# Patient Record
Sex: FEMALE | Race: WHITE | NOT HISPANIC OR LATINO | Employment: OTHER | URBAN - NONMETROPOLITAN AREA
[De-identification: names, ages, dates, MRNs, and addresses within clinical notes are randomized per-mention and may not be internally consistent; named-entity substitution may affect disease eponyms.]

---

## 2017-01-04 DIAGNOSIS — Z79.4 DIABETES MELLITUS TYPE 2, INSULIN DEPENDENT (HCC): ICD-10-CM

## 2017-01-04 DIAGNOSIS — E11.9 DIABETES MELLITUS TYPE 2, INSULIN DEPENDENT (HCC): ICD-10-CM

## 2017-01-04 RX ORDER — GLIPIZIDE 10 MG/1
10 TABLET ORAL
Qty: 180 TABLET | Refills: 2 | Status: SHIPPED | OUTPATIENT
Start: 2017-01-04 | End: 2017-09-28 | Stop reason: SDUPTHER

## 2017-01-10 RX ORDER — INSULIN GLARGINE 100 [IU]/ML
42 INJECTION, SOLUTION SUBCUTANEOUS NIGHTLY
Qty: 3 EACH | Refills: 3 | Status: SHIPPED | OUTPATIENT
Start: 2017-01-10 | End: 2017-05-09

## 2017-02-15 RX ORDER — PAROXETINE HYDROCHLORIDE 40 MG/1
40 TABLET, FILM COATED ORAL EVERY MORNING
Qty: 30 TABLET | Refills: 2 | Status: SHIPPED | OUTPATIENT
Start: 2017-02-15 | End: 2017-05-15 | Stop reason: SDUPTHER

## 2017-02-24 RX ORDER — LOVASTATIN 40 MG/1
40 TABLET ORAL 2 TIMES DAILY
Qty: 60 TABLET | Refills: 0 | Status: SHIPPED | OUTPATIENT
Start: 2017-02-24 | End: 2017-03-27 | Stop reason: SDUPTHER

## 2017-03-20 RX ORDER — TRAZODONE HYDROCHLORIDE 50 MG/1
50 TABLET ORAL NIGHTLY
Qty: 30 TABLET | Refills: 0 | Status: SHIPPED | OUTPATIENT
Start: 2017-03-20 | End: 2017-04-17 | Stop reason: SDUPTHER

## 2017-03-23 ENCOUNTER — APPOINTMENT (OUTPATIENT)
Dept: LAB | Facility: HOSPITAL | Age: 72
End: 2017-03-23

## 2017-03-23 PROCEDURE — 36415 COLL VENOUS BLD VENIPUNCTURE: CPT | Performed by: INTERNAL MEDICINE

## 2017-03-23 PROCEDURE — 80061 LIPID PANEL: CPT | Performed by: INTERNAL MEDICINE

## 2017-03-23 PROCEDURE — 80048 BASIC METABOLIC PNL TOTAL CA: CPT | Performed by: INTERNAL MEDICINE

## 2017-03-23 PROCEDURE — 83036 HEMOGLOBIN GLYCOSYLATED A1C: CPT | Performed by: INTERNAL MEDICINE

## 2017-03-27 ENCOUNTER — OFFICE VISIT (OUTPATIENT)
Dept: INTERNAL MEDICINE | Facility: CLINIC | Age: 72
End: 2017-03-27

## 2017-03-27 VITALS
DIASTOLIC BLOOD PRESSURE: 60 MMHG | SYSTOLIC BLOOD PRESSURE: 110 MMHG | HEIGHT: 64 IN | BODY MASS INDEX: 42.73 KG/M2 | WEIGHT: 250.3 LBS

## 2017-03-27 DIAGNOSIS — E78.2 MIXED HYPERLIPIDEMIA: ICD-10-CM

## 2017-03-27 DIAGNOSIS — I10 ESSENTIAL HYPERTENSION: ICD-10-CM

## 2017-03-27 DIAGNOSIS — Z12.11 SCREENING FOR COLON CANCER: ICD-10-CM

## 2017-03-27 DIAGNOSIS — Z12.11 SCREEN FOR COLON CANCER: ICD-10-CM

## 2017-03-27 DIAGNOSIS — Z79.4 TYPE 2 DIABETES MELLITUS WITH HYPERGLYCEMIA, WITH LONG-TERM CURRENT USE OF INSULIN (HCC): Primary | ICD-10-CM

## 2017-03-27 DIAGNOSIS — F17.200 TOBACCO DEPENDENCE: ICD-10-CM

## 2017-03-27 DIAGNOSIS — E11.65 TYPE 2 DIABETES MELLITUS WITH HYPERGLYCEMIA, WITH LONG-TERM CURRENT USE OF INSULIN (HCC): Primary | ICD-10-CM

## 2017-03-27 PROCEDURE — 99213 OFFICE O/P EST LOW 20 MIN: CPT | Performed by: INTERNAL MEDICINE

## 2017-03-27 PROCEDURE — 99406 BEHAV CHNG SMOKING 3-10 MIN: CPT | Performed by: INTERNAL MEDICINE

## 2017-03-27 RX ORDER — PEN NEEDLE, DIABETIC 31 G X1/4"
1 NEEDLE, DISPOSABLE MISCELLANEOUS DAILY
Qty: 30 EACH | Refills: 11 | Status: SHIPPED | OUTPATIENT
Start: 2017-03-27 | End: 2017-05-09

## 2017-03-27 RX ORDER — LOVASTATIN 40 MG/1
TABLET ORAL
Qty: 60 TABLET | Refills: 0 | Status: SHIPPED | OUTPATIENT
Start: 2017-03-27 | End: 2017-04-24 | Stop reason: SDUPTHER

## 2017-03-27 RX ORDER — INSULIN GLARGINE 100 [IU]/ML
42 INJECTION, SOLUTION SUBCUTANEOUS NIGHTLY
Qty: 6 PEN | Refills: 11 | Status: SHIPPED | OUTPATIENT
Start: 2017-03-27 | End: 2017-06-14 | Stop reason: SDUPTHER

## 2017-03-27 NOTE — PROGRESS NOTES
Subjective   Michelle Pierce is a 71 y.o. female     Patient ois in for recheck on HTN, DM, hyperlipidemia and anxiety.      Her BP is well controlled on Lisinopril. She denies any cardiovascular complaints.  She cut down on Sodium in her diet.   Patient is compliant with her medication and takes it as prescribed.  Her mobility is limited by arthritis in her knees.  She still smokes. Patient has Nicoderm but has not tried it yet.      DM, on Insulin.  Not always compliant with ADA diet.  She cut on drinking sugary drinks.  Weight is down 5 lbs.  Quality uncontrolled.  FBS is between 80 and 100. She does not check postprandial glucose.  HgbA1c 7.44. /03/23/2017/.  On ACE inhibitor for HTN.  On statin for hyperlipidemia.  Lipids. , , HDL 45, LDL 78.   She tolerates Lovastatin well and denies myalgia, myositis or fatigue.  No history of elevated LFTs.  Microvascular complications of DM. No retinopathy. No peripheral neuropathy.  Macrovascular complications. No CAD. No PAD.      Anxiety is controlled on Paxil. She is sleeping better on Trazodone. Denies any side effects related to the medications.     Past Medical History:   Diagnosis Date   • Arthritis of spine    • Chronic obstructive lung disease    • Depression    • Diabetes mellitus    • Hyperlipidemia    • Hypertension    • Neurologic disorder    • Obesity    • Sleep apnea    • Tobacco dependence      Past Surgical History:   Procedure Laterality Date   • ESOPHAGOSCOPY / EGD  02/25/1998    Mild esophagitis, mild gastritis, the most likely diagnosis for the esophageal inflammation is reflux esophagitis.   • EYE SURGERY      cataract, right eye   • FINGER SURGERY  04/07/1994    Partial amputation, right ring finger   • HYSTERECTOMY     • TUBAL ABDOMINAL LIGATION         Social History   Substance Use Topics   • Smoking status: Current Every Day Smoker     Packs/day: 1.00     Years: 50.00     Types: Cigarettes   • Smokeless tobacco: Not on file   • Alcohol  use No     Family History   Problem Relation Age of Onset   • Diabetes Other    • Heart disease Other    • Hypertension Other      Allergies   Allergen Reactions   • Morpholine Salicylate Shortness Of Breath     Morphine allergy, states couldn't breathe   • Morphine And Related      Dyspnea     Current Outpatient Prescriptions on File Prior to Visit   Medication Sig Dispense Refill   • ACCU-CHEK COMPACT TEST DRUM test strip USE TO TEST SUGAR TWICE DAILY ([id number] EXP 12/31/2017) 100 each 1   • B Complex Vitamins (VITAMIN B COMPLEX) capsule capsule Take 1 capsule by mouth daily.     • B-D UF III MINI PEN NEEDLES 31G X 5 MM misc      • cholecalciferol (VITAMIN D3) 1000 UNITS tablet Take 1,000 Units by mouth daily.     • glipiZIDE (GLUCOTROL) 10 MG tablet Take 1 tablet by mouth 2 (Two) Times a Day Before Meals. 180 tablet 2   • insulin glargine (LANTUS) 100 UNIT/ML injection Inject 42 Units under the skin Every Night. 3 each 3   • lisinopril (PRINIVIL,ZESTRIL) 5 MG tablet Take 0.5 tablets by mouth Daily. 30 tablet 3   • metFORMIN (GLUCOPHAGE) 1000 MG tablet Take 1 tablet by mouth 2 (Two) Times a Day With Meals. 180 tablet 2   • nicotine (NICODERM CQ) 21 MG/24HR patch Place 1 patch on the skin every day. 30 patch 0   • PARoxetine (PAXIL) 40 MG tablet Take 1 tablet by mouth Every Morning. 30 tablet 2   • traZODone (DESYREL) 50 MG tablet Take 1 tablet by mouth Every Night. 30 tablet 0   • [DISCONTINUED] Insulin Pen Needle (PEN NEEDLES) 31G X 6 MM misc Inject 1 applicator under the skin daily.     • [DISCONTINUED] LANTUS SOLOSTAR 100 UNIT/ML injection pen      • [DISCONTINUED] lovastatin (MEVACOR) 40 MG tablet Take 1 tablet by mouth 2 (Two) Times a Day. 60 tablet 0     No current facility-administered medications on file prior to visit.      Review of Systems   Constitutional: Negative for activity change.   HENT: Negative.    Eyes: Negative for visual disturbance.   Cardiovascular: Negative for chest pain, palpitations  and leg swelling.   Gastrointestinal: Negative for abdominal pain, constipation and diarrhea.   Endocrine: Positive for polydipsia and polyuria. Negative for cold intolerance and heat intolerance.   Musculoskeletal: Positive for back pain.        Positive for pain in her knees   Neurological: Negative for dizziness, tremors, light-headedness and headaches.   Psychiatric/Behavioral: Negative for sleep disturbance. The patient is not nervous/anxious.        Objective   Physical Exam   Constitutional: She is oriented to person, place, and time.   HENT:   Head: Normocephalic and atraumatic.   Nose: Nose normal.   Mouth/Throat: Oropharynx is clear and moist.   Neck: Neck supple. No JVD present. No thyromegaly present.   Cardiovascular: Normal rate and regular rhythm.    No murmur heard.  Pulmonary/Chest: Effort normal and breath sounds normal.   Abdominal: Soft. Bowel sounds are normal. She exhibits no mass.   Lymphadenopathy:     She has no cervical adenopathy.   Neurological: She is alert and oriented to person, place, and time.   Psychiatric: She has a normal mood and affect. Her behavior is normal.   Nursing note and vitals reviewed.          Patient Active Problem List   Diagnosis   • Diabetes mellitus, type II, insulin dependent   • Essential hypertension   • Depressive disorder   • Tobacco dependence   • Sleep apnea             Results for orders placed or performed in visit on 12/20/16   Hemoglobin A1c   Result Value Ref Range    Hemoglobin A1C 7.44 (H) 4 - 5.6 %   Basic Metabolic Panel   Result Value Ref Range    Glucose 104 (H) 60 - 100 mg/dL    BUN 10 7 - 21 mg/dL    Creatinine 0.74 0.50 - 1.00 mg/dL    Sodium 139 137 - 145 mmol/L    Potassium 4.4 3.5 - 5.1 mmol/L    Chloride 104 95 - 110 mmol/L    CO2 27.0 22.0 - 31.0 mmol/L    Calcium 9.5 8.4 - 10.2 mg/dL    eGFR Non  Amer 78 45 - 104 mL/min/1.73    BUN/Creatinine Ratio 13.5 7.0 - 25.0    Anion Gap 8.0 5.0 - 15.0 mmol/L   Lipid Panel   Result Value  Ref Range    Total Cholesterol 163 0 - 199 mg/dL    Triglycerides 128 20 - 199 mg/dL    HDL Cholesterol 45 (L) 60 - 200 mg/dL    LDL Cholesterol  78 1 - 129 mg/dL    LDL/HDL Ratio 2.05 0.00 - 3.22       Assessment    Diagnosis Plan   1. Type 2 diabetes mellitus with hyperglycemia, with long-term current use of insulin     2. Essential hypertension     3. Mixed hyperlipidemia     4. Tobacco dependence     5. Screening for colon cancer  Cologuard         Plan     1. Patient will continue Lantus 42 units.      She will continue Metformin and Glipizide.      Will add Farxiga 5 mg daily.      Side effects of the medications discussed with the patient.      ADA diet discussed with the patient.  2. Patient will continue Lisinopril.      DASH.      1.5 gr Sodium restriction.  3. Therapy with Lovastatin is continued.       Advised on weight loss.       Advised to increase physical activity.  4. I advised the patient of the risks in continuing to use tobacco.  I also discussed how to quit smoking and the patient has expressed the willingness to           quit. She will try Nicoderm patches.        During this visit, I spent less than 3 minutes counseling the patient regarding smoking cessation.             Follow up in 3 months.

## 2017-04-17 RX ORDER — TRAZODONE HYDROCHLORIDE 50 MG/1
TABLET ORAL
Qty: 30 TABLET | Refills: 1 | Status: SHIPPED | OUTPATIENT
Start: 2017-04-17 | End: 2017-06-19 | Stop reason: SDUPTHER

## 2017-04-24 RX ORDER — LOVASTATIN 40 MG/1
40 TABLET ORAL 2 TIMES DAILY
Qty: 60 TABLET | Refills: 3 | Status: SHIPPED | OUTPATIENT
Start: 2017-04-24 | End: 2017-08-24 | Stop reason: SDUPTHER

## 2017-05-08 ENCOUNTER — TELEPHONE (OUTPATIENT)
Dept: INTERNAL MEDICINE | Facility: CLINIC | Age: 72
End: 2017-05-08

## 2017-05-09 ENCOUNTER — OFFICE VISIT (OUTPATIENT)
Dept: GASTROENTEROLOGY | Facility: CLINIC | Age: 72
End: 2017-05-09

## 2017-05-09 VITALS
WEIGHT: 247.3 LBS | HEIGHT: 66 IN | BODY MASS INDEX: 39.74 KG/M2 | DIASTOLIC BLOOD PRESSURE: 64 MMHG | HEART RATE: 101 BPM | SYSTOLIC BLOOD PRESSURE: 109 MMHG

## 2017-05-09 DIAGNOSIS — K63.5 COLON POLYPS: ICD-10-CM

## 2017-05-09 DIAGNOSIS — Z12.11 ENCOUNTER FOR SCREENING FOR MALIGNANT NEOPLASM OF COLON: Primary | ICD-10-CM

## 2017-05-09 PROCEDURE — 99213 OFFICE O/P EST LOW 20 MIN: CPT | Performed by: INTERNAL MEDICINE

## 2017-05-09 RX ORDER — NEOMYCIN SULFATE, POLYMYXIN B SULFATE, HYDROCORTISONE 3.5; 10000; 1 MG/ML; [USP'U]/ML; MG/ML
SOLUTION/ DROPS AURICULAR (OTIC)
Refills: 2 | COMMUNITY
Start: 2017-04-10 | End: 2018-05-08

## 2017-05-09 RX ORDER — NYSTATIN 100000 U/G
OINTMENT TOPICAL
COMMUNITY
Start: 2017-04-10 | End: 2018-05-08

## 2017-05-09 RX ORDER — LIDOCAINE HYDROCHLORIDE 10 MG/ML
0.1 INJECTION, SOLUTION EPIDURAL; INFILTRATION; INTRACAUDAL; PERINEURAL ONCE AS NEEDED
Status: CANCELLED | OUTPATIENT
Start: 2017-05-09

## 2017-05-09 RX ORDER — TRIAMCINOLONE ACETONIDE 0.25 MG/G
CREAM TOPICAL
COMMUNITY
Start: 2017-04-11 | End: 2019-05-14

## 2017-05-09 RX ORDER — SODIUM CHLORIDE 0.9 % (FLUSH) 0.9 %
1-10 SYRINGE (ML) INJECTION AS NEEDED
Status: CANCELLED | OUTPATIENT
Start: 2017-05-09

## 2017-05-09 RX ORDER — DEXTROSE AND SODIUM CHLORIDE 5; .45 G/100ML; G/100ML
30 INJECTION, SOLUTION INTRAVENOUS CONTINUOUS PRN
Status: CANCELLED | OUTPATIENT
Start: 2017-06-01

## 2017-05-15 RX ORDER — PAROXETINE HYDROCHLORIDE 40 MG/1
40 TABLET, FILM COATED ORAL EVERY MORNING
Qty: 30 TABLET | Refills: 2 | Status: SHIPPED | OUTPATIENT
Start: 2017-05-15 | End: 2017-08-11 | Stop reason: SDUPTHER

## 2017-05-30 ENCOUNTER — TELEPHONE (OUTPATIENT)
Dept: INTERNAL MEDICINE | Facility: CLINIC | Age: 72
End: 2017-05-30

## 2017-06-01 ENCOUNTER — ANESTHESIA (OUTPATIENT)
Dept: GASTROENTEROLOGY | Facility: HOSPITAL | Age: 72
End: 2017-06-01

## 2017-06-01 ENCOUNTER — HOSPITAL ENCOUNTER (OUTPATIENT)
Facility: HOSPITAL | Age: 72
Setting detail: HOSPITAL OUTPATIENT SURGERY
Discharge: HOME | End: 2017-06-01
Attending: INTERNAL MEDICINE | Admitting: INTERNAL MEDICINE

## 2017-06-01 ENCOUNTER — ANESTHESIA EVENT (OUTPATIENT)
Dept: GASTROENTEROLOGY | Facility: HOSPITAL | Age: 72
End: 2017-06-01

## 2017-06-01 VITALS
RESPIRATION RATE: 18 BRPM | DIASTOLIC BLOOD PRESSURE: 56 MMHG | OXYGEN SATURATION: 94 % | TEMPERATURE: 98.4 F | BODY MASS INDEX: 42.58 KG/M2 | WEIGHT: 240.3 LBS | HEIGHT: 63 IN | HEART RATE: 68 BPM | SYSTOLIC BLOOD PRESSURE: 131 MMHG

## 2017-06-01 DIAGNOSIS — K63.5 COLON POLYPS: ICD-10-CM

## 2017-06-01 PROCEDURE — 88305 TISSUE EXAM BY PATHOLOGIST: CPT | Performed by: INTERNAL MEDICINE

## 2017-06-01 PROCEDURE — 45385 COLONOSCOPY W/LESION REMOVAL: CPT | Performed by: INTERNAL MEDICINE

## 2017-06-01 PROCEDURE — 88305 TISSUE EXAM BY PATHOLOGIST: CPT | Performed by: PATHOLOGY

## 2017-06-01 PROCEDURE — 25010000002 FENTANYL CITRATE (PF) 250 MCG/5ML SOLUTION: Performed by: NURSE ANESTHETIST, CERTIFIED REGISTERED

## 2017-06-01 PROCEDURE — 45380 COLONOSCOPY AND BIOPSY: CPT | Performed by: INTERNAL MEDICINE

## 2017-06-01 PROCEDURE — 25010000002 PROPOFOL 10 MG/ML EMULSION: Performed by: NURSE ANESTHETIST, CERTIFIED REGISTERED

## 2017-06-01 PROCEDURE — 25010000002 MIDAZOLAM PER 1 MG: Performed by: NURSE ANESTHETIST, CERTIFIED REGISTERED

## 2017-06-01 RX ORDER — MIDAZOLAM HYDROCHLORIDE 1 MG/ML
INJECTION INTRAMUSCULAR; INTRAVENOUS AS NEEDED
Status: DISCONTINUED | OUTPATIENT
Start: 2017-06-01 | End: 2017-06-01 | Stop reason: SURG

## 2017-06-01 RX ORDER — PROPOFOL 10 MG/ML
VIAL (ML) INTRAVENOUS AS NEEDED
Status: DISCONTINUED | OUTPATIENT
Start: 2017-06-01 | End: 2017-06-01 | Stop reason: SURG

## 2017-06-01 RX ORDER — FENTANYL CITRATE 50 UG/ML
INJECTION, SOLUTION INTRAMUSCULAR; INTRAVENOUS AS NEEDED
Status: DISCONTINUED | OUTPATIENT
Start: 2017-06-01 | End: 2017-06-01 | Stop reason: SURG

## 2017-06-01 RX ORDER — DEXTROSE MONOHYDRATE 50 MG/ML
INJECTION, SOLUTION INTRAVENOUS CONTINUOUS PRN
Status: DISCONTINUED | OUTPATIENT
Start: 2017-06-01 | End: 2017-06-01 | Stop reason: SURG

## 2017-06-01 RX ORDER — DEXTROSE AND SODIUM CHLORIDE 5; .45 G/100ML; G/100ML
30 INJECTION, SOLUTION INTRAVENOUS CONTINUOUS PRN
Status: DISCONTINUED | OUTPATIENT
Start: 2017-06-01 | End: 2017-06-01 | Stop reason: HOSPADM

## 2017-06-01 RX ADMIN — PROPOFOL 40 MG: 10 INJECTION, EMULSION INTRAVENOUS at 09:40

## 2017-06-01 RX ADMIN — FENTANYL CITRATE 50 MCG: 50 INJECTION, SOLUTION INTRAMUSCULAR; INTRAVENOUS at 09:13

## 2017-06-01 RX ADMIN — DEXTROSE MONOHYDRATE: 50 INJECTION, SOLUTION INTRAVENOUS at 09:03

## 2017-06-01 RX ADMIN — DEXTROSE AND SODIUM CHLORIDE 30 ML/HR: 5; 450 INJECTION, SOLUTION INTRAVENOUS at 09:02

## 2017-06-01 RX ADMIN — MIDAZOLAM 1 MG: 1 INJECTION INTRAMUSCULAR; INTRAVENOUS at 09:23

## 2017-06-01 RX ADMIN — PROPOFOL 10 MG: 10 INJECTION, EMULSION INTRAVENOUS at 09:50

## 2017-06-01 RX ADMIN — PROPOFOL 40 MG: 10 INJECTION, EMULSION INTRAVENOUS at 09:13

## 2017-06-01 RX ADMIN — MIDAZOLAM 1 MG: 1 INJECTION INTRAMUSCULAR; INTRAVENOUS at 09:13

## 2017-06-01 RX ADMIN — FENTANYL CITRATE 50 MCG: 50 INJECTION, SOLUTION INTRAMUSCULAR; INTRAVENOUS at 09:23

## 2017-06-01 RX ADMIN — PROPOFOL 160 MG: 10 INJECTION, EMULSION INTRAVENOUS at 09:38

## 2017-06-01 NOTE — ANESTHESIA PREPROCEDURE EVALUATION
Anesthesia Evaluation     Patient summary reviewed and Nursing notes reviewed   NPO Solid Status: > 8 hours       Airway   Mallampati: III  TM distance: >3 FB  Neck ROM: full  no difficulty expected  Dental    (+) upper dentures    Pulmonary - normal exam   (+) COPD moderate, sleep apnea,   Cardiovascular - normal exam    (+) hypertension well controlled, hyperlipidemia      Neuro/Psych  (+) psychiatric history,    GI/Hepatic/Renal/Endo      Musculoskeletal     Abdominal  - normal exam   Substance History      OB/GYN          Other   (+) arthritis                                   Anesthesia Plan    ASA 3     MAC     intravenous induction   Anesthetic plan and risks discussed with patient.    Plan discussed with CRNA.

## 2017-06-01 NOTE — ANESTHESIA POSTPROCEDURE EVALUATION
Patient: Michelle Pirece    Procedure Summary     Date Anesthesia Start Anesthesia Stop Room / Location    06/01/17 0903 1000 [facility] ENDOSCOPY 1 / [facility] ENDOSCOPY       Procedure Diagnosis Surgeon Provider    COLONOSCOPY (N/A ) Colon polyps  (Colon polyps [K63.5]) MD Philipp Whitley CRNA          Anesthesia Type: MAC  Last vitals  /56 (06/01/17 1006)    Temp 98.4 °F (36.9 °C) (06/01/17 1006)    Pulse 74 (06/01/17 1006)   Resp 18 (06/01/17 1006)    SpO2 93 % (06/01/17 1006)      Post Anesthesia Care and Evaluation    Patient location during evaluation: bedside  Patient participation: complete - patient participated  Level of consciousness: awake and alert  Pain score: 0  Pain management: adequate  Airway patency: patent  Anesthetic complications: No anesthetic complications  PONV Status: none  Cardiovascular status: acceptable  Respiratory status: acceptable  Hydration status: acceptable

## 2017-06-01 NOTE — H&P (VIEW-ONLY)
Chief Complaint   Patient presents with   • Positive Cologuard Resulting On 05/08/2017        Michelle Pierce is a  71 y.o. female here for a follow up visit for Recent positive colo-guard  HPI :  The patient is seen because of the recent finding of a positive colo-guard.   She has a past history of colonic polyps. Her last colonoscopy was done in 2005.   Bowel movements are occasionally loose. She does describe some urgency. This is been a recent development. She will occasionally have fecal incontinence. Denies rectal bleeding.   Family history is negative for carcinoma of the colon.    Past Medical History:   Diagnosis Date   • Arthritis of spine    • Chronic obstructive lung disease    • Depression    • Diabetes mellitus    • Hyperlipidemia    • Hypertension    • Neurologic disorder    • Obesity    • Sleep apnea    • Tobacco dependence        Current Outpatient Prescriptions   Medication Sig Dispense Refill   • ACCU-CHEK COMPACT TEST DRUM test strip USE TO TEST SUGAR TWICE DAILY ([id number] EXP 12/31/2017) 100 each 1   • B Complex Vitamins (VITAMIN B COMPLEX) capsule capsule Take 1 capsule by mouth daily.     • B-D UF III MINI PEN NEEDLES 31G X 5 MM misc      • cholecalciferol (VITAMIN D3) 1000 UNITS tablet Take 1,000 Units by mouth daily.     • dapagliflozin (FARXIGA) 5 MG tablet tablet Take 1 tablet by mouth Daily. 30 tablet 11   • glipiZIDE (GLUCOTROL) 10 MG tablet Take 1 tablet by mouth 2 (Two) Times a Day Before Meals. 180 tablet 2   • LANTUS SOLOSTAR 100 UNIT/ML injection pen Inject 42 Units under the skin Every Night. 6 pen 11   • lisinopril (PRINIVIL,ZESTRIL) 5 MG tablet Take 0.5 tablets by mouth Daily. 30 tablet 3   • lovastatin (MEVACOR) 40 MG tablet Take 1 tablet by mouth 2 (Two) Times a Day. 60 tablet 3   • metFORMIN (GLUCOPHAGE) 1000 MG tablet Take 1 tablet by mouth 2 (Two) Times a Day With Meals. 180 tablet 2   • nicotine (NICODERM CQ) 21 MG/24HR patch Place 1 patch on the skin every day. 30 patch  0   • PARoxetine (PAXIL) 40 MG tablet Take 1 tablet by mouth Every Morning. 30 tablet 2   • traZODone (DESYREL) 50 MG tablet TAKE 1 TABLET BY MOUTH EVERY NIGHT. 30 tablet 1   • neomycin-polymyxin-hydrocortisone (CORTISPORIN) 1 % solution otic solution APPLY UNDER AFFECTED NAILS TWICE DAILY  2   • nystatin (MYCOSTATIN) [id number] UNIT/GM ointment      • triamcinolone (KENALOG) 0.025 % cream      • Unable to find Nystat/HC 1% Zinc 111 Gram  2     No current facility-administered medications for this visit.        PRN Meds:.    Allergies   Allergen Reactions   • Morpholine Salicylate Shortness Of Breath     Morphine allergy, states couldn't breathe   • Morphine And Related      Dyspnea       Social History     Social History   • Marital status:      Spouse name: N/A   • Number of children: N/A   • Years of education: N/A     Occupational History   • Retired      Grandson resides with patient.     Social History Main Topics   • Smoking status: Current Every Day Smoker     Packs/day: 1.00     Years: 50.00     Types: Cigarettes   • Smokeless tobacco: Never Used      Comment: Patient has been prescribed Nicoderm CQ, but has not begun prescription medication therapy at present time.   • Alcohol use No   • Drug use: No   • Sexual activity: Defer     Other Topics Concern   • Not on file     Social History Narrative       Family History   Problem Relation Age of Onset   • Diabetes Other    • Heart disease Other    • Hypertension Other    • Diabetes Mother    • Hypertension Father    • Heart attack Father    • Kidney failure Father        Review of Systems   Constitutional: Negative for activity change, appetite change, chills, diaphoresis, fatigue, fever and unexpected weight change.   Gastrointestinal: Positive for diarrhea. Negative for abdominal distention, abdominal pain, anal bleeding, blood in stool, constipation, nausea, rectal pain and vomiting.   Psychiatric/Behavioral: Negative for confusion.       Vitals:     05/09/17 1412   BP: 109/64   Pulse: 101       Physical Exam   Constitutional: Vital signs are normal. She appears well-developed and well-nourished.  Non-toxic appearance. She does not have a sickly appearance. She does not appear ill. No distress.   Cardiovascular: Regular rhythm, S1 normal, S2 normal and normal heart sounds.   No extrasystoles are present. PMI is not displaced.    No murmur heard.  Pulmonary/Chest: Breath sounds normal. No tachypnea. No respiratory distress.   Abdominal: Soft. Normal appearance and bowel sounds are normal. She exhibits no shifting dullness, no distension, no fluid wave, no ascites and no mass. There is no hepatosplenomegaly, splenomegaly or hepatomegaly. There is no tenderness. No hernia.   Neurological: She is alert.   Nursing note and vitals reviewed.      ASSESSMENT AND PLAN      ICD-10-CM ICD-9-CM   1. Encounter for screening for malignant neoplasm of colon Z12.11 V76.51   2. Colon polyps K63.5 211.3   Plan:   Surveillance colonoscopy.   If a lesion is found at colonoscopy she does not wish to have this discussed with her family           This document has been electronically signed by Mat Jennings MD on May 14, 2017 2:57 PM                                   “EMR Dragon/Transcription disclaimer:   Much of this encounter note is an electronic transcription/translation of spoken language to printed text. The electronic translation of spoken language may permit erroneous, or at times, nonsensical words or phrases to be inadvertently transcribed; Although I have reviewed the note for such errors, some may still exist.”

## 2017-06-01 NOTE — PLAN OF CARE
Problem: Patient Care Overview (Adult)  Goal: Plan of Care Review    06/01/17 1002   Coping/Psychosocial Response Interventions   Plan Of Care Reviewed With patient   Patient Care Overview   Progress no change   Outcome Evaluation   Outcome Summary/Follow up Plan vss         Problem: GI Endoscopy (Adult)  Goal: Signs and Symptoms of Listed Potential Problems Will be Absent or Manageable (GI Endoscopy)  Outcome: Ongoing (interventions implemented as appropriate)    06/01/17 1002   GI Endoscopy   Problems Assessed (GI Endoscopy) all   Problems Present (GI Endoscopy) none

## 2017-06-01 NOTE — PLAN OF CARE
Problem: Patient Care Overview (Adult)  Goal: Plan of Care Review  Outcome: Outcome(s) achieved Date Met:  06/01/17 06/01/17 1008   Coping/Psychosocial Response Interventions   Plan Of Care Reviewed With patient   Patient Care Overview   Progress no change   Outcome Evaluation   Outcome Summary/Follow up Plan vss, pt alert         Problem: GI Endoscopy (Adult)  Goal: Signs and Symptoms of Listed Potential Problems Will be Absent or Manageable (GI Endoscopy)  Outcome: Outcome(s) achieved Date Met:  06/01/17 06/01/17 1008   GI Endoscopy   Problems Assessed (GI Endoscopy) all   Problems Present (GI Endoscopy) none

## 2017-06-01 NOTE — INTERVAL H&P NOTE
H&P reviewed. The patient was examined and there are no changes to the H&P.    I have discussed risks and benefits of the proposed procedure. Patient wishes to proceed.

## 2017-06-05 LAB
LAB AP CASE REPORT: NORMAL
Lab: NORMAL
PATH REPORT.FINAL DX SPEC: NORMAL
PATH REPORT.GROSS SPEC: NORMAL

## 2017-06-06 ENCOUNTER — TELEPHONE (OUTPATIENT)
Dept: GASTROENTEROLOGY | Facility: CLINIC | Age: 72
End: 2017-06-06

## 2017-06-06 NOTE — TELEPHONE ENCOUNTER
06/06/2017, 1400 - Patient's telephone call is returned per this staff member [phone] with requested Colonoscopy pathology performed 06/01/2017 per Dr. Jennings as stated in the Impression, Recommendation, and Final Diagnosis sections of procedure report.  Patient given reminder of, and encouraged to keep, clinical appointment with Dr. Jennings Wednesday, August 9, 2017 at 11:00 a.m. during which time results will be discussed in greater detail.  Patient verbalized understanding.

## 2017-06-14 RX ORDER — INSULIN GLARGINE 100 [IU]/ML
42 INJECTION, SOLUTION SUBCUTANEOUS NIGHTLY
Qty: 6 PEN | Refills: 1 | Status: SHIPPED | OUTPATIENT
Start: 2017-06-14 | End: 2017-08-22 | Stop reason: SDUPTHER

## 2017-06-19 RX ORDER — TRAZODONE HYDROCHLORIDE 50 MG/1
50 TABLET ORAL NIGHTLY
Qty: 30 TABLET | Refills: 0 | Status: SHIPPED | OUTPATIENT
Start: 2017-06-19 | End: 2017-07-20 | Stop reason: SDUPTHER

## 2017-07-11 ENCOUNTER — TELEPHONE (OUTPATIENT)
Dept: INTERNAL MEDICINE | Facility: CLINIC | Age: 72
End: 2017-07-11

## 2017-07-11 RX ORDER — LISINOPRIL 5 MG/1
2.5 TABLET ORAL DAILY
Qty: 30 TABLET | Refills: 0 | Status: SHIPPED | OUTPATIENT
Start: 2017-07-11 | End: 2017-09-08 | Stop reason: SDUPTHER

## 2017-07-11 NOTE — TELEPHONE ENCOUNTER
Pt needs a refill on lisinopril 5 mg 1 x daily sent to [facility].

## 2017-07-20 ENCOUNTER — OFFICE VISIT (OUTPATIENT)
Dept: INTERNAL MEDICINE | Facility: CLINIC | Age: 72
End: 2017-07-20

## 2017-07-20 ENCOUNTER — APPOINTMENT (OUTPATIENT)
Dept: LAB | Facility: HOSPITAL | Age: 72
End: 2017-07-20

## 2017-07-20 VITALS
BODY MASS INDEX: 43.32 KG/M2 | HEIGHT: 63 IN | SYSTOLIC BLOOD PRESSURE: 130 MMHG | WEIGHT: 244.5 LBS | DIASTOLIC BLOOD PRESSURE: 70 MMHG

## 2017-07-20 DIAGNOSIS — E78.2 MIXED HYPERLIPIDEMIA: ICD-10-CM

## 2017-07-20 DIAGNOSIS — B37.2 CANDIDIASIS OF SKIN: ICD-10-CM

## 2017-07-20 DIAGNOSIS — F32.A ANXIETY AND DEPRESSION: ICD-10-CM

## 2017-07-20 DIAGNOSIS — I10 ESSENTIAL HYPERTENSION: ICD-10-CM

## 2017-07-20 DIAGNOSIS — F41.9 ANXIETY AND DEPRESSION: ICD-10-CM

## 2017-07-20 DIAGNOSIS — E11.9 TYPE 2 DIABETES MELLITUS WITHOUT COMPLICATION, WITH LONG-TERM CURRENT USE OF INSULIN (HCC): Primary | ICD-10-CM

## 2017-07-20 DIAGNOSIS — Z79.4 TYPE 2 DIABETES MELLITUS WITHOUT COMPLICATION, WITH LONG-TERM CURRENT USE OF INSULIN (HCC): Primary | ICD-10-CM

## 2017-07-20 LAB
ALBUMIN SERPL-MCNC: 4.4 G/DL (ref 3.4–4.8)
ALBUMIN UR-MCNC: 2.2 MG/L
ALBUMIN/GLOB SERPL: 1.2 G/DL (ref 1.1–1.8)
ALP SERPL-CCNC: 97 U/L (ref 38–126)
ALT SERPL W P-5'-P-CCNC: 25 U/L (ref 9–52)
ANION GAP SERPL CALCULATED.3IONS-SCNC: 13 MMOL/L (ref 5–15)
AST SERPL-CCNC: 29 U/L (ref 14–36)
BILIRUB SERPL-MCNC: 0.2 MG/DL (ref 0.2–1.3)
BUN BLD-MCNC: 13 MG/DL (ref 7–21)
BUN/CREAT SERPL: 15.3 (ref 7–25)
CALCIUM SPEC-SCNC: 9.8 MG/DL (ref 8.4–10.2)
CHLORIDE SERPL-SCNC: 99 MMOL/L (ref 95–110)
CO2 SERPL-SCNC: 25 MMOL/L (ref 22–31)
CREAT BLD-MCNC: 0.85 MG/DL (ref 0.5–1)
CREAT UR-MCNC: 137.7 MG/DL
GFR SERPL CREATININE-BSD FRML MDRD: 66 ML/MIN/1.73 (ref 45–104)
GLOBULIN UR ELPH-MCNC: 3.6 GM/DL (ref 2.3–3.5)
GLUCOSE BLD-MCNC: 94 MG/DL (ref 60–100)
GLUCOSE BLDC GLUCOMTR-MCNC: 55 MG/DL (ref 70–130)
HBA1C MFR BLD: 6.6 % (ref 4–5.6)
MICROALBUMIN/CREAT UR: 16 MG/G (ref 0–30)
POTASSIUM BLD-SCNC: 4.7 MMOL/L (ref 3.5–5.1)
PROT SERPL-MCNC: 8 G/DL (ref 6.3–8.6)
SODIUM BLD-SCNC: 137 MMOL/L (ref 137–145)

## 2017-07-20 PROCEDURE — 36415 COLL VENOUS BLD VENIPUNCTURE: CPT | Performed by: INTERNAL MEDICINE

## 2017-07-20 PROCEDURE — 99214 OFFICE O/P EST MOD 30 MIN: CPT | Performed by: INTERNAL MEDICINE

## 2017-07-20 PROCEDURE — 82962 GLUCOSE BLOOD TEST: CPT | Performed by: INTERNAL MEDICINE

## 2017-07-20 PROCEDURE — 82043 UR ALBUMIN QUANTITATIVE: CPT | Performed by: INTERNAL MEDICINE

## 2017-07-20 PROCEDURE — 82570 ASSAY OF URINE CREATININE: CPT | Performed by: INTERNAL MEDICINE

## 2017-07-20 PROCEDURE — 83036 HEMOGLOBIN GLYCOSYLATED A1C: CPT | Performed by: INTERNAL MEDICINE

## 2017-07-20 PROCEDURE — 80053 COMPREHEN METABOLIC PANEL: CPT | Performed by: INTERNAL MEDICINE

## 2017-07-20 RX ORDER — NYSTATIN 100000 [USP'U]/G
POWDER TOPICAL 3 TIMES DAILY
Qty: 45 G | Refills: 1 | Status: SHIPPED | OUTPATIENT
Start: 2017-07-20 | End: 2018-05-08

## 2017-07-20 RX ORDER — TRAZODONE HYDROCHLORIDE 50 MG/1
50 TABLET ORAL NIGHTLY
Qty: 30 TABLET | Refills: 5 | Status: SHIPPED | OUTPATIENT
Start: 2017-07-20 | End: 2017-12-21 | Stop reason: SDUPTHER

## 2017-07-20 RX ORDER — ESTRADIOL 0.1 MG/G
CREAM VAGINAL
Qty: 42.5 G | Refills: 1 | Status: SHIPPED | OUTPATIENT
Start: 2017-07-20 | End: 2019-02-14

## 2017-07-20 NOTE — PROGRESS NOTES
Subjective   Michelle Pierce is a 71 y.o. female     Patient is in for recheck on HTN, DM, hyperlipidemia and depressive disorder.    Her BP is controlled on Lisinopril. She checks BP at home and it has been in normal range.  She has no cardiovascular complaints.  She continues to smoke and does not think she can quit or reduce at present.      DM, type II.  Duration over 10 years.  Not always compliant with ADA diet.  On Lantus, Metformin and Glipipizide.  Farxiga is too expensive,  Glucose is better controlled and runs between 100 and 150.  HgbA1c 7.44 /03/23/2017/.  On ACE for HTN.  On statin for hyperlipidemia.  Tolerates Lovastatin well.  No myalgia or myositis. No history of elevated LFTs.  Lipids. LDL 78, HDL 45.  Microvascular complications of DM. No retinopathy. No neuropathy.  Macrovascular complications. No CAD. No PAD.    Complains of pruritic erythematous rash under her breasts and in inguinal area.    Paxil is working well for anxiety. Insomnia improved on Trazodone. Denies side effects related to the medications.    Past Medical History:   Diagnosis Date   • Arthritis of spine    • Chronic obstructive lung disease    • Depression    • Diabetes mellitus    • Hyperlipidemia    • Hypertension    • Neurologic disorder    • Obesity    • Sleep apnea    • Tobacco dependence      Past Surgical History:   Procedure Laterality Date   • BACK SURGERY  2013    May 1 or 2, 2013   • COLONOSCOPY  01/21/2005   • COLONOSCOPY N/A 6/1/2017    Procedure: COLONOSCOPY;  Surgeon: Mat Jennings MD;  Location: [facility] ENDOSCOPY;  Service:    • ESOPHAGOSCOPY / EGD  02/25/1998    Mild esophagitis, mild gastritis, the most likely diagnosis for the esophageal inflammation is reflux esophagitis.   • EYE SURGERY      cataract, right eye   • FINGER SURGERY  04/07/1994    Partial amputation, right ring finger   • HYSTERECTOMY     • TUBAL ABDOMINAL LIGATION         Social History   Substance Use Topics   • Smoking status: Current  Every Day Smoker     Packs/day: 1.00     Years: 50.00     Types: Cigarettes   • Smokeless tobacco: Never Used      Comment: Patient has been prescribed Nicoderm CQ, but has not begun prescription medication therapy at present time.   • Alcohol use No     Family History   Problem Relation Age of Onset   • Diabetes Other    • Heart disease Other    • Hypertension Other    • Diabetes Mother    • Hypertension Father    • Heart attack Father    • Kidney failure Father      Allergies   Allergen Reactions   • Morpholine Salicylate Shortness Of Breath     Morphine allergy, states couldn't breathe   • Morphine And Related      Dyspnea     Current Outpatient Prescriptions on File Prior to Visit   Medication Sig Dispense Refill   • B Complex Vitamins (VITAMIN B COMPLEX) capsule capsule Take 1 capsule by mouth daily.     • B-D UF III MINI PEN NEEDLES 31G X 5 MM misc      • cholecalciferol (VITAMIN D3) 1000 UNITS tablet Take 1,000 Units by mouth daily.     • glipiZIDE (GLUCOTROL) 10 MG tablet Take 1 tablet by mouth 2 (Two) Times a Day Before Meals. 180 tablet 2   • LANTUS SOLOSTAR 100 UNIT/ML injection pen Inject 42 Units under the skin Every Night. 6 pen 1   • lisinopril (PRINIVIL,ZESTRIL) 5 MG tablet Take 0.5 tablets by mouth Daily. 30 tablet 0   • lovastatin (MEVACOR) 40 MG tablet Take 1 tablet by mouth 2 (Two) Times a Day. 60 tablet 3   • metFORMIN (GLUCOPHAGE) 1000 MG tablet Take 1 tablet by mouth 2 (Two) Times a Day With Meals. 180 tablet 2   • neomycin-polymyxin-hydrocortisone (CORTISPORIN) 1 % solution otic solution APPLY UNDER AFFECTED NAILS TWICE DAILY  2   • nicotine (NICODERM CQ) 21 MG/24HR patch Place 1 patch on the skin every day. 30 patch 0   • nystatin (MYCOSTATIN) 998490 UNIT/GM ointment      • PARoxetine (PAXIL) 40 MG tablet Take 1 tablet by mouth Every Morning. 30 tablet 2   • triamcinolone (KENALOG) 0.025 % cream      • Unable to find Nystat/HC 1% Zinc 111 Gram  2   • [DISCONTINUED] ACCU-CHEK COMPACT TEST  DRUM test strip USE TO TEST SUGAR TWICE DAILY ([id number] EXP 12/31/2017) 100 each 1   • [DISCONTINUED] dapagliflozin (FARXIGA) 5 MG tablet tablet Take 1 tablet by mouth Daily. 30 tablet 11   • [DISCONTINUED] traZODone (DESYREL) 50 MG tablet Take 1 tablet by mouth Every Night. 30 tablet 0     No current facility-administered medications on file prior to visit.      Review of Systems   Constitutional: Negative for activity change and fatigue.   Eyes: Negative for photophobia and visual disturbance.   Respiratory: Negative for chest tightness and shortness of breath.    Cardiovascular: Negative for chest pain, palpitations and leg swelling.   Gastrointestinal: Negative for abdominal pain, constipation and diarrhea.   Endocrine: Positive for polydipsia. Negative for cold intolerance and heat intolerance.   Genitourinary: Negative for flank pain and frequency.   Musculoskeletal: Positive for back pain.   Skin: Positive for rash.   Neurological: Negative for dizziness, tremors and light-headedness.   Psychiatric/Behavioral: The patient is not nervous/anxious.        Objective   Physical Exam   Constitutional: She is oriented to person, place, and time. She appears well-developed and well-nourished.   HENT:   Head: Normocephalic.   Right Ear: External ear normal.   Eyes: Conjunctivae are normal. Pupils are equal, round, and reactive to light.   Neck: Neck supple. No JVD present. No thyromegaly present.   Cardiovascular: Normal rate and regular rhythm.    No murmur heard.  Pulmonary/Chest: Effort normal and breath sounds normal.   Abdominal: Soft. Bowel sounds are normal. She exhibits no mass.   Musculoskeletal: Normal range of motion. She exhibits no tenderness.   Neurological: She is alert and oriented to person, place, and time. She has normal reflexes.   Skin: Skin is warm and dry.   Psychiatric: She has a normal mood and affect.           Patient Active Problem List   Diagnosis   • Diabetes mellitus, type II, insulin  dependent   • Essential hypertension   • Depressive disorder   • Tobacco dependence   • Sleep apnea         Office Visit on 07/20/2017   Component Date Value Ref Range Status   • Glucose 07/20/2017 94  60 - 100 mg/dL Final   • BUN 07/20/2017 13  7 - 21 mg/dL Final   • Creatinine 07/20/2017 0.85  0.50 - 1.00 mg/dL Final   • Sodium 07/20/2017 137  137 - 145 mmol/L Final   • Potassium 07/20/2017 4.7  3.5 - 5.1 mmol/L Final   • Chloride 07/20/2017 99  95 - 110 mmol/L Final   • CO2 07/20/2017 25.0  22.0 - 31.0 mmol/L Final   • Calcium 07/20/2017 9.8  8.4 - 10.2 mg/dL Final   • Total Protein 07/20/2017 8.0  6.3 - 8.6 g/dL Final   • Albumin 07/20/2017 4.40  3.40 - 4.80 g/dL Final   • ALT (SGPT) 07/20/2017 25  9 - 52 U/L Final   • AST (SGOT) 07/20/2017 29  14 - 36 U/L Final   • Alkaline Phosphatase 07/20/2017 97  38 - 126 U/L Final   • Total Bilirubin 07/20/2017 0.2  0.2 - 1.3 mg/dL Final   • eGFR Non  Amer 07/20/2017 66  45 - 104 mL/min/1.73 Final   • Globulin 07/20/2017 3.6* 2.3 - 3.5 gm/dL Final   • A/G Ratio 07/20/2017 1.2  1.1 - 1.8 g/dL Final   • BUN/Creatinine Ratio 07/20/2017 15.3  7.0 - 25.0 Final   • Anion Gap 07/20/2017 13.0  5.0 - 15.0 mmol/L Final   • Hemoglobin A1C 07/20/2017 6.60* 4 - 5.6 % Final   • Microalbumin/Creatinine Ratio 07/20/2017 16.0  0.0 - 30.0 mg/g Final   • Creatinine, Urine 07/20/2017 137.7  mg/dL Final   • Microalbumin, Urine 07/20/2017 2.2  mg/L Final   • Glucose 07/20/2017 55* 70 - 130 mg/dL Final   Admission on 06/01/2017, Discharged on 06/01/2017   Component Date Value Ref Range Status   • Case Report 06/01/2017    Final                    Value:Surgical Pathology Report                         Case: [id number]                                  Authorizing Provider:  Mat Jennings MD    Collected:           06/01/2017 09:23 AM          Ordering Location:     [facility]             Received:            06/01/2017 11:25 AM                                 [facility]  SUITES                                                     Pathologist:           Tyler Faust MD                                                            Specimens:   1) - Large Intestine, 40cm hot snare                                                                2) - Large Intestine, Transverse Colon, cold snare                                                  3) - Large Intestine, hepatic flexure                                                               4) - Large Intestine, illeocecal valve                                                              5) - Large Intestine, Cecum, cecum polyp                                                                                      6) - Large Intestine, Right / Ascending Colon                                                       7) - Large Intestine, splenic flexure                                                               8) - Large Intestine, Rectum                                                              • Final Diagnosis 06/01/2017    Final                    Value:This result contains rich text formatting which cannot be displayed here.   • Gross Description 06/01/2017    Final                    Value:This result contains rich text formatting which cannot be displayed here.   Office Visit on 12/20/2016   Component Date Value Ref Range Status   • Hemoglobin A1C 03/23/2017 7.44* 4 - 5.6 % Final   • Glucose 03/23/2017 104* 60 - 100 mg/dL Final   • BUN 03/23/2017 10  7 - 21 mg/dL Final   • Creatinine 03/23/2017 0.74  0.50 - 1.00 mg/dL Final   • Sodium 03/23/2017 139  137 - 145 mmol/L Final   • Potassium 03/23/2017 4.4  3.5 - 5.1 mmol/L Final   • Chloride 03/23/2017 104  95 - 110 mmol/L Final   • CO2 03/23/2017 27.0  22.0 - 31.0 mmol/L Final   • Calcium 03/23/2017 9.5  8.4 - 10.2 mg/dL Final   • eGFR Non African Amer 03/23/2017 78  45 - 104 mL/min/1.73 Final   • BUN/Creatinine Ratio 03/23/2017 13.5  7.0 - 25.0 Final   • Anion Gap 03/23/2017 8.0  5.0  - 15.0 mmol/L Final   • Total Cholesterol 03/23/2017 163  0 - 199 mg/dL Final   • Triglycerides 03/23/2017 128  20 - 199 mg/dL Final   • HDL Cholesterol 03/23/2017 45* 60 - 200 mg/dL Final   • LDL Cholesterol  03/23/2017 78  1 - 129 mg/dL Final   • LDL/HDL Ratio 03/23/2017 2.05  0.00 - 3.22 Final         Assessment    Diagnosis Plan   1. Type 2 diabetes mellitus without complication, with long-term current use of insulin  Comprehensive Metabolic Panel    Hemoglobin A1c    Microalbumin / Creatinine Urine Ratio    POC Glucose Fingerstick   2. Essential hypertension     3. Mixed hyperlipidemia     4. Anxiety and depression     5. Candidiasis of skin           Plan      1. Patient will continue Lantus, Metformin and Glipizide.      ADA diet.      Advised to monitor glucose on regular basis.      Goals of diabetic control discussed.      Will recheck HgbA1c.      Signs and treatment of hypoglycemia discussed with the patient.  2. Lisinopril is continued.      DASH.      1.5 gr Sodium restriction.  3. Lovastatin is continued.      Advised on weight loss and physical activity.  4. Patient will continue Paxil and Trazodone.  5. Nystatin powder tid to affected area.      Advised to keep area dry.      Follow up in 3 months.

## 2017-07-21 ENCOUNTER — TELEPHONE (OUTPATIENT)
Dept: INTERNAL MEDICINE | Facility: CLINIC | Age: 72
End: 2017-07-21

## 2017-08-04 ENCOUNTER — TELEPHONE (OUTPATIENT)
Dept: GASTROENTEROLOGY | Facility: CLINIC | Age: 72
End: 2017-08-04

## 2017-08-04 NOTE — TELEPHONE ENCOUNTER
07/28/2017, 1302 - Patient telephoned canceling clinical appointment with Dr. Jennings scheduled Wednesday, August 9, 2017 at 11:00 a.m. due to family member undergoing operation in [place].    08/04/2017, 1329 - Patient telephoned per this staff member [phone] to ensure patient aware of Colonoscopy pathology results (this staff member had previously spoken with patient 06/06/2017 as patient had requested pathology results, and notified pathology  results would be discussed in greater detail per Dr. Jennings during clinical appointment).  Dr. Jennings made aware of pathology and stated patient to repeat Colonoscopy at 2 year interval.  Patient notified she should receive letter via mail when Colonoscopy date is due.  Patient verbalized understanding.

## 2017-08-11 ENCOUNTER — TELEPHONE (OUTPATIENT)
Dept: INTERNAL MEDICINE | Facility: CLINIC | Age: 72
End: 2017-08-11

## 2017-08-11 RX ORDER — PAROXETINE HYDROCHLORIDE 40 MG/1
40 TABLET, FILM COATED ORAL EVERY MORNING
Qty: 30 TABLET | Refills: 2 | Status: SHIPPED | OUTPATIENT
Start: 2017-08-11 | End: 2017-11-13 | Stop reason: SDUPTHER

## 2017-08-22 ENCOUNTER — TELEPHONE (OUTPATIENT)
Dept: INTERNAL MEDICINE | Facility: CLINIC | Age: 72
End: 2017-08-22

## 2017-08-22 RX ORDER — INSULIN GLARGINE 100 [IU]/ML
42 INJECTION, SOLUTION SUBCUTANEOUS NIGHTLY
Qty: 6 PEN | Refills: 1 | Status: SHIPPED | OUTPATIENT
Start: 2017-08-22 | End: 2017-11-07 | Stop reason: SDUPTHER

## 2017-08-24 ENCOUNTER — TELEPHONE (OUTPATIENT)
Dept: INTERNAL MEDICINE | Facility: CLINIC | Age: 72
End: 2017-08-24

## 2017-08-24 RX ORDER — LOVASTATIN 40 MG/1
TABLET ORAL
Qty: 60 TABLET | Refills: 1 | Status: SHIPPED | OUTPATIENT
Start: 2017-08-24 | End: 2017-10-23 | Stop reason: SDUPTHER

## 2017-09-08 ENCOUNTER — TELEPHONE (OUTPATIENT)
Dept: INTERNAL MEDICINE | Facility: CLINIC | Age: 72
End: 2017-09-08

## 2017-09-08 RX ORDER — LISINOPRIL 5 MG/1
2.5 TABLET ORAL DAILY
Qty: 30 TABLET | Refills: 1 | Status: SHIPPED | OUTPATIENT
Start: 2017-09-08 | End: 2017-12-21 | Stop reason: SDUPTHER

## 2017-09-18 ENCOUNTER — TELEPHONE (OUTPATIENT)
Dept: INTERNAL MEDICINE | Facility: CLINIC | Age: 72
End: 2017-09-18

## 2017-09-28 ENCOUNTER — TELEPHONE (OUTPATIENT)
Dept: INTERNAL MEDICINE | Facility: CLINIC | Age: 72
End: 2017-09-28

## 2017-09-28 DIAGNOSIS — E11.9 DIABETES MELLITUS TYPE 2, INSULIN DEPENDENT (HCC): ICD-10-CM

## 2017-09-28 DIAGNOSIS — Z79.4 DIABETES MELLITUS TYPE 2, INSULIN DEPENDENT (HCC): ICD-10-CM

## 2017-09-28 RX ORDER — GLIPIZIDE 10 MG/1
10 TABLET ORAL
Qty: 60 TABLET | Refills: 0 | Status: SHIPPED | OUTPATIENT
Start: 2017-09-28 | End: 2017-10-04 | Stop reason: SDUPTHER

## 2017-10-04 ENCOUNTER — CONSULT (OUTPATIENT)
Dept: SURGERY | Facility: CLINIC | Age: 72
End: 2017-10-04

## 2017-10-04 ENCOUNTER — TELEPHONE (OUTPATIENT)
Dept: INTERNAL MEDICINE | Facility: CLINIC | Age: 72
End: 2017-10-04

## 2017-10-04 VITALS
BODY MASS INDEX: 43.59 KG/M2 | SYSTOLIC BLOOD PRESSURE: 116 MMHG | DIASTOLIC BLOOD PRESSURE: 72 MMHG | HEIGHT: 63 IN | WEIGHT: 246 LBS

## 2017-10-04 DIAGNOSIS — C44.90 SKIN CANCER: Primary | ICD-10-CM

## 2017-10-04 DIAGNOSIS — E11.9 DIABETES MELLITUS TYPE 2, INSULIN DEPENDENT (HCC): ICD-10-CM

## 2017-10-04 DIAGNOSIS — Z79.4 DIABETES MELLITUS TYPE 2, INSULIN DEPENDENT (HCC): ICD-10-CM

## 2017-10-04 PROCEDURE — 11602 EXC TR-EXT MAL+MARG 1.1-2 CM: CPT | Performed by: SURGERY

## 2017-10-04 PROCEDURE — 88305 TISSUE EXAM BY PATHOLOGIST: CPT | Performed by: PATHOLOGY

## 2017-10-04 PROCEDURE — 88305 TISSUE EXAM BY PATHOLOGIST: CPT | Performed by: SURGERY

## 2017-10-04 PROCEDURE — 99203 OFFICE O/P NEW LOW 30 MIN: CPT | Performed by: SURGERY

## 2017-10-04 RX ORDER — GLIPIZIDE 10 MG/1
10 TABLET ORAL
Qty: 60 TABLET | Refills: 0 | Status: SHIPPED | OUTPATIENT
Start: 2017-10-04 | End: 2017-10-23 | Stop reason: SDUPTHER

## 2017-10-04 NOTE — PROGRESS NOTES
Subjective   Michelle Pierce is a 71 y.o. female.   Referral from [facility] skin cancer  History of Present Illness   Miss Pierce is a 71-year-old lady with a previous history of skin cancer.  She had a skin cancer removed from her right forearm done several years ago.  She has had another skin cancer arise just next-year-old incision.  Dr. Haywood recently saw her in his shave biopsy of the right arm lesion that came back positive for cancer.  She's had multiple lesions frozen off on her skin before.    Past medical history of arthritis, skin cancer, diabetes.    Surgical history of hysterectomy and tubal ligation.      Current Outpatient Prescriptions:   •  B Complex Vitamins (VITAMIN B COMPLEX) capsule capsule, Take 1 capsule by mouth daily., Disp: , Rfl:   •  B-D UF III MINI PEN NEEDLES 31G X 5 MM misc, , Disp: , Rfl:   •  cholecalciferol (VITAMIN D3) 1000 UNITS tablet, Take 1,000 Units by mouth daily., Disp: , Rfl:   •  glipiZIDE (GLUCOTROL) 10 MG tablet, Take 1 tablet by mouth 2 (Two) Times a Day Before Meals., Disp: 60 tablet, Rfl: 0  •  glucose blood test strip, 1 each by Other route As Needed (as directed). Use as instructed, Disp: 100 each, Rfl: 1  •  LANTUS SOLOSTAR 100 UNIT/ML injection pen, Inject 42 Units under the skin Every Night., Disp: 6 pen, Rfl: 1  •  lisinopril (PRINIVIL,ZESTRIL) 5 MG tablet, Take 0.5 tablets by mouth Daily., Disp: 30 tablet, Rfl: 1  •  lovastatin (MEVACOR) 40 MG tablet, TAKE 1 TABLET BY MOUTH 2 (TWO) TIMES A DAY., Disp: 60 tablet, Rfl: 1  •  metFORMIN (GLUCOPHAGE) 1000 MG tablet, Take 1 tablet by mouth 2 (Two) Times a Day With Meals., Disp: 180 tablet, Rfl: 0  •  nystatin (MYCOSTATIN) 869965 UNIT/GM powder, Apply  topically 3 (Three) Times a Day., Disp: 45 g, Rfl: 1  •  PARoxetine (PAXIL) 40 MG tablet, Take 1 tablet by mouth Every Morning., Disp: 30 tablet, Rfl: 2  •  traZODone (DESYREL) 50 MG tablet, Take 1 tablet by mouth Every Night., Disp: 30 tablet, Rfl: 5  •  estradiol  (ESTRACE VAGINAL) 0.1 MG/GM vaginal cream, 1/2 applicator 3 times a week, Disp: 42.5 g, Rfl: 1  •  neomycin-polymyxin-hydrocortisone (CORTISPORIN) 1 % solution otic solution, APPLY UNDER AFFECTED NAILS TWICE DAILY, Disp: , Rfl: 2  •  nicotine (NICODERM CQ) 21 MG/24HR patch, Place 1 patch on the skin every day., Disp: 30 patch, Rfl: 0  •  nystatin (MYCOSTATIN) 939024 UNIT/GM ointment, , Disp: , Rfl:   •  triamcinolone (KENALOG) 0.025 % cream, , Disp: , Rfl:   •  Unable to find, Nystat/HC 1% Zinc 111 Gram, Disp: , Rfl: 2    Family history of diabetes, heart disease, kidney disease.    Social patient is retired and lives [place] and is .  She has 3 children and smokes pack a day cigarettes doesn't use alcohol.  The following portions of the patient's history were reviewed and updated as appropriate: allergies, current medications, past family history, past medical history, past social history, past surgical history and problem list.    Review of Systems   Constitutional: Negative.  Negative for appetite change, chills, fever and unexpected weight change.   HENT: Negative.  Negative for hearing loss, nosebleeds and trouble swallowing.    Eyes: Negative.  Negative for visual disturbance.   Respiratory: Positive for cough and shortness of breath. Negative for apnea, choking, chest tightness, wheezing and stridor.    Cardiovascular: Negative.  Negative for chest pain, palpitations and leg swelling.   Gastrointestinal: Negative.  Negative for abdominal distention, abdominal pain, blood in stool, constipation, diarrhea, nausea and vomiting.   Endocrine: Negative.  Negative for cold intolerance, heat intolerance, polydipsia, polyphagia and polyuria.   Genitourinary: Negative.  Negative for difficulty urinating, dysuria, frequency, hematuria and urgency.   Musculoskeletal: Positive for arthralgias and back pain. Negative for myalgias and neck pain.   Skin: Negative.  Negative for color change, pallor and rash.    Allergic/Immunologic: Negative.  Negative for immunocompromised state.   Neurological: Negative.  Negative for dizziness, seizures, syncope, light-headedness, numbness and headaches.   Hematological: Negative.  Negative for adenopathy.   Psychiatric/Behavioral: Negative.  Negative for suicidal ideas. The patient is not nervous/anxious.        Objective   Physical Exam   Constitutional: She is oriented to person, place, and time. She appears well-developed and well-nourished.   HENT:   Head: Normocephalic and atraumatic.   Eyes: EOM are normal. Pupils are equal, round, and reactive to light.   Neck: Normal range of motion. Neck supple.   Cardiovascular: Normal rate and regular rhythm.    Pulmonary/Chest: Effort normal and breath sounds normal.   Abdominal: Soft. Bowel sounds are normal.   Musculoskeletal: Normal range of motion.   Neurological: She is alert and oriented to person, place, and time.   Skin: Skin is warm and dry.   Psychiatric: She has a normal mood and affect. Her behavior is normal.   Vitals reviewed.  Exam she has chronically skin sending her skin on both her upper extremities.  The right forearm there is no incision multiple small skin lesions.  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  Assessment/Plan   Michelle was seen today for skin cancer.    Diagnoses and all orders for this visit:    Skin cancer    Mrs. Pierce is a 71-year-old lady with recurrent skin cancer on her right forearm.  After further discussion today we'll excise this lesion in clinic and do a punch biopsy on one of the lesions also right next to the incision.  The procedures explained to her and she understands and agrees.  Problem were 2 weeks for suture removal and pathology review.  Thank you for the consult.

## 2017-10-04 NOTE — PROGRESS NOTES
Procedure   Procedures      Consent was obtained.  Patient sustained to the treatment room and right arm was prepped and draped normal sterile fashion.  Timeout was performed.  Local anesthetic was injected in around lesion.  We first did a punch biopsy of the concerning skin lesion.  Next week size and the node skin cancer on her right forearm.  A 1 cm elliptical incision was made to completely excise the skin lesion.  Incision was taken down to the to the dermis to subcutaneous tissue and the skin was passed off for specimen.  Hemostasis was achieved with 4-0 Vicryl.  After some pressure the wound was closed with running 4-0 nylon suture.  Band-Aids were applied and procedure was terminated.  Patient tolerated she well.

## 2017-10-13 ENCOUNTER — TELEPHONE (OUTPATIENT)
Dept: SURGERY | Facility: CLINIC | Age: 72
End: 2017-10-13

## 2017-10-13 NOTE — TELEPHONE ENCOUNTER
PT WOULD LIKE FOR YOU TO CALL HER W/ RESULTS  FROM [facility]    DOES NOT WANT TO KEEP APPT  ON Monday .....    CALL PT   [phone]

## 2017-10-23 ENCOUNTER — TELEPHONE (OUTPATIENT)
Dept: INTERNAL MEDICINE | Facility: CLINIC | Age: 72
End: 2017-10-23

## 2017-10-23 DIAGNOSIS — Z79.4 DIABETES MELLITUS TYPE 2, INSULIN DEPENDENT (HCC): ICD-10-CM

## 2017-10-23 DIAGNOSIS — E11.9 DIABETES MELLITUS TYPE 2, INSULIN DEPENDENT (HCC): ICD-10-CM

## 2017-10-23 RX ORDER — LOVASTATIN 40 MG/1
40 TABLET ORAL NIGHTLY
Qty: 30 TABLET | Refills: 0 | Status: SHIPPED | OUTPATIENT
Start: 2017-10-23 | End: 2017-11-07 | Stop reason: SDUPTHER

## 2017-10-23 RX ORDER — GLIPIZIDE 10 MG/1
10 TABLET ORAL
Qty: 60 TABLET | Refills: 0 | Status: SHIPPED | OUTPATIENT
Start: 2017-10-23 | End: 2017-11-01 | Stop reason: SDUPTHER

## 2017-10-23 NOTE — TELEPHONE ENCOUNTER
Patient needs a refill on glipizide 10 mg BID and lovastatin 40 mg BID at [facility].

## 2017-11-01 DIAGNOSIS — Z79.4 DIABETES MELLITUS TYPE 2, INSULIN DEPENDENT (HCC): ICD-10-CM

## 2017-11-01 DIAGNOSIS — E11.9 DIABETES MELLITUS TYPE 2, INSULIN DEPENDENT (HCC): ICD-10-CM

## 2017-11-01 RX ORDER — GLIPIZIDE 10 MG/1
TABLET ORAL
Qty: 60 TABLET | Refills: 0 | Status: SHIPPED | OUTPATIENT
Start: 2017-11-01 | End: 2017-12-21 | Stop reason: SDUPTHER

## 2017-11-06 ENCOUNTER — OFFICE VISIT (OUTPATIENT)
Dept: PODIATRY | Facility: CLINIC | Age: 72
End: 2017-11-06

## 2017-11-06 VITALS
OXYGEN SATURATION: 94 % | BODY MASS INDEX: 43.59 KG/M2 | SYSTOLIC BLOOD PRESSURE: 126 MMHG | HEART RATE: 101 BPM | WEIGHT: 246 LBS | DIASTOLIC BLOOD PRESSURE: 63 MMHG | HEIGHT: 63 IN

## 2017-11-06 DIAGNOSIS — B35.3 TINEA PEDIS OF LEFT FOOT: Primary | ICD-10-CM

## 2017-11-06 DIAGNOSIS — L40.9 PSORIASIS: ICD-10-CM

## 2017-11-06 DIAGNOSIS — M79.672 FOOT PAIN, LEFT: ICD-10-CM

## 2017-11-06 PROCEDURE — 99203 OFFICE O/P NEW LOW 30 MIN: CPT | Performed by: PODIATRIST

## 2017-11-06 RX ORDER — CLOTRIMAZOLE 1 %
CREAM (GRAM) TOPICAL 2 TIMES DAILY
Qty: 45 G | Refills: 2 | Status: SHIPPED | OUTPATIENT
Start: 2017-11-06 | End: 2019-05-14

## 2017-11-06 RX ORDER — BETAMETHASONE DIPROPIONATE 0.05 %
OINTMENT (GRAM) TOPICAL 2 TIMES DAILY
Qty: 45 G | Refills: 2 | Status: SHIPPED | OUTPATIENT
Start: 2017-11-06

## 2017-11-06 NOTE — PROGRESS NOTES
Michelle Pierce  1945  72 y.o. female   PCP- Adonis  BS-93 per patient.  Patient presents today for possible rash on her left foot.    11/06/2017  Chief Complaint   Patient presents with   • Left Foot - Rash           History of Present Illness    Michelle Pierce is a 72 y.o. female who presents for evaluation of skin lesion or rash on her left foot. Has history of squamous cell carcinoma as well as psoriasis. States this area of concern has been present for months to years. Intermittently fissures and causes pain. Occasionally itchy. Denies signs or infection.            Past Medical History:   Diagnosis Date   • Arthritis of spine    • Chronic obstructive lung disease    • Cirrhosis    • Depression    • Diabetes mellitus    • Hyperlipidemia    • Hypertension    • Neurologic disorder    • Obesity    • Sleep apnea    • Tobacco dependence          Past Surgical History:   Procedure Laterality Date   • BACK SURGERY  2013    May 1 or 2, 2013   • COLONOSCOPY  01/21/2005   • COLONOSCOPY N/A 6/1/2017    Procedure: COLONOSCOPY;  Surgeon: Mat Jennings MD;  Location: [facility] ENDOSCOPY;  Service:    • ESOPHAGOSCOPY / EGD  02/25/1998    Mild esophagitis, mild gastritis, the most likely diagnosis for the esophageal inflammation is reflux esophagitis.   • EYE SURGERY      cataract, right eye   • FINGER SURGERY  04/07/1994    Partial amputation, right ring finger   • HYSTERECTOMY     • TUBAL ABDOMINAL LIGATION           Family History   Problem Relation Age of Onset   • Diabetes Other    • Heart disease Other    • Hypertension Other    • Diabetes Mother    • Hypertension Father    • Heart attack Father    • Kidney failure Father    • No Known Problems Maternal Grandmother    • No Known Problems Maternal Grandfather    • No Known Problems Paternal Grandmother    • No Known Problems Paternal Grandfather          Social History     Social History   • Marital status:      Spouse name: N/A   • Number of children: N/A    • Years of education: N/A     Occupational History   • Retired      Grandson resides with patient.     Social History Main Topics   • Smoking status: Current Every Day Smoker     Packs/day: 1.00     Years: 50.00     Types: Cigarettes   • Smokeless tobacco: Never Used      Comment: Patient has been prescribed Nicoderm CQ, but has not begun prescription medication therapy at present time.   • Alcohol use No   • Drug use: No   • Sexual activity: Defer     Other Topics Concern   • Not on file     Social History Narrative         Current Outpatient Prescriptions   Medication Sig Dispense Refill   • B Complex Vitamins (VITAMIN B COMPLEX) capsule capsule Take 1 capsule by mouth daily.     • B-D UF III MINI PEN NEEDLES 31G X 5 MM misc      • cholecalciferol (VITAMIN D3) 1000 UNITS tablet Take 1,000 Units by mouth daily.     • estradiol (ESTRACE VAGINAL) 0.1 MG/GM vaginal cream 1/2 applicator 3 times a week 42.5 g 1   • glipiZIDE (GLUCOTROL) 10 MG tablet TAKE 1 TABLET TWICE DAILY BEFORE MEALS 60 tablet 0   • glucose blood test strip 1 each by Other route As Needed (as directed). Use as instructed 100 each 1   • lisinopril (PRINIVIL,ZESTRIL) 5 MG tablet Take 0.5 tablets by mouth Daily. 30 tablet 1   • metFORMIN (GLUCOPHAGE) 1000 MG tablet Take 1 tablet by mouth 2 (Two) Times a Day With Meals. 180 tablet 0   • neomycin-polymyxin-hydrocortisone (CORTISPORIN) 1 % solution otic solution APPLY UNDER AFFECTED NAILS TWICE DAILY  2   • nicotine (NICODERM CQ) 21 MG/24HR patch Place 1 patch on the skin every day. 30 patch 0   • nystatin (MYCOSTATIN) 237712 UNIT/GM ointment      • nystatin (MYCOSTATIN) 170940 UNIT/GM powder Apply  topically 3 (Three) Times a Day. 45 g 1   • traZODone (DESYREL) 50 MG tablet Take 1 tablet by mouth Every Night. 30 tablet 5   • triamcinolone (KENALOG) 0.025 % cream      • Unable to find Nystat/HC 1% Zinc 111 Gram  2   • albuterol (VENTOLIN HFA) 108 (90 Base) MCG/ACT inhaler Inhale 2 puffs Every 4 (Four)  "Hours As Needed for Wheezing. 1 inhaler 0   • amoxicillin-clavulanate (AUGMENTIN) 875-125 MG per tablet Take 1 tablet by mouth 2 (Two) Times a Day for 10 days. 20 tablet 0   • benzonatate (TESSALON) 200 MG capsule Take 1 capsule by mouth 3 (Three) Times a Day As Needed for Cough. 30 capsule 0   • betamethasone dipropionate (DIPROLENE) 0.05 % ointment Apply  topically 2 (Two) Times a Day. 45 g 2   • clotrimazole (LOTRIMIN) 1 % cream Apply  topically 2 (Two) Times a Day. 45 g 2   • LANTUS SOLOSTAR 100 UNIT/ML injection pen Inject 42 Units under the skin Every Night. 6 pen 0   • lovastatin (MEVACOR) 40 MG tablet Take 1 tablet by mouth 2 (Two) Times a Day. 60 tablet 0   • PARoxetine (PAXIL) 40 MG tablet Take 1 tablet by mouth Every Morning. 30 tablet 2   • promethazine-dextromethorphan (PROMETHAZINE-DM) 6.25-15 MG/5ML syrup Take 5 mL by mouth At Night As Needed for Cough. 120 mL 0     No current facility-administered medications for this visit.          OBJECTIVE    /63  Pulse 101  Ht 63\" (160 cm)  Wt 246 lb (112 kg)  SpO2 94%  BMI 43.58 kg/m2      Review of Systems   Constitutional:  Denies recent weight loss, weight gain, fever or chills, no change in exercise tolerance  Musculoskeletal: Foot pain.   Skin: Left foot rash  Neurological: Denies paresthesias.  Psychiatric/Behavioral: Denies depression        Physical Exam   Constitutional: she appears well-developed and well-nourished.   HEENT: Normocephalic. Atraumatic  CV: No tenderness. RRR  Resp: Non-labored respiration. No wheezes.   Psychiatric: she has a normal mood and affect. her   behavior is normal.      Lower Extremity Exam:  Vascular: DP/PT pulses palpable 1+.   Negative hair growth.   Minimal perimalleolar edema  Neuro: Protective sensation intact, b/l.  DTRs intact  Integument: No open wounds  Left plantar foot scaling in moccasin distribution. No drainage  Atrophic skin noted b/l   No masses  Web spaces dry  Musculoskeletal: LE muscle strength " 5/5.   Gait Normal  Mild hammertoe deformity toes 2-5 b/l.        ASSESSMENT AND PLAN    Michelle was seen today for rash.    Diagnoses and all orders for this visit:    Tinea pedis of left foot    Psoriasis    Foot pain, left    Other orders  -     clotrimazole (LOTRIMIN) 1 % cream; Apply  topically 2 (Two) Times a Day.  -     betamethasone dipropionate (DIPROLENE) 0.05 % ointment; Apply  topically 2 (Two) Times a Day.    -Comprehensive foot and ankle exam performed  -Educated pt on diagnosis, etiology and treatment of vesicular tinea pedis  -Rx Lotrisone BID, [facility] pharmacy antifungal foot bath  -Recheck 4 weeks          This document has been electronically signed by Brendon Lyon DPM on November 19, 2017 10:11 PM     EMR Dragon/Transcription disclaimer:   Much of this encounter note is an electronic transcription/translation of spoken language to printed text. The electronic translation of spoken language may permit erroneous, or at times, nonsensical words or phrases to be inadvertently transcribed; Although I have reviewed the note for such errors, some may still exist.    Brendon Lyon DPM  11/19/2017  10:11 PM

## 2017-11-07 ENCOUNTER — TELEPHONE (OUTPATIENT)
Dept: INTERNAL MEDICINE | Facility: CLINIC | Age: 72
End: 2017-11-07

## 2017-11-07 ENCOUNTER — OFFICE VISIT (OUTPATIENT)
Dept: SLEEP MEDICINE | Facility: HOSPITAL | Age: 72
End: 2017-11-07

## 2017-11-07 VITALS
OXYGEN SATURATION: 96 % | DIASTOLIC BLOOD PRESSURE: 72 MMHG | SYSTOLIC BLOOD PRESSURE: 132 MMHG | HEART RATE: 94 BPM | HEIGHT: 63 IN | BODY MASS INDEX: 44.12 KG/M2 | WEIGHT: 249 LBS

## 2017-11-07 DIAGNOSIS — G47.33 OBSTRUCTIVE SLEEP APNEA, ADULT: Primary | ICD-10-CM

## 2017-11-07 PROCEDURE — 99213 OFFICE O/P EST LOW 20 MIN: CPT | Performed by: INTERNAL MEDICINE

## 2017-11-07 PROCEDURE — 99406 BEHAV CHNG SMOKING 3-10 MIN: CPT | Performed by: INTERNAL MEDICINE

## 2017-11-07 RX ORDER — LOVASTATIN 40 MG/1
40 TABLET ORAL 2 TIMES DAILY
Qty: 60 TABLET | Refills: 0 | Status: SHIPPED | OUTPATIENT
Start: 2017-11-07 | End: 2017-12-06 | Stop reason: SDUPTHER

## 2017-11-07 RX ORDER — INSULIN GLARGINE 100 [IU]/ML
42 INJECTION, SOLUTION SUBCUTANEOUS NIGHTLY
Qty: 6 PEN | Refills: 0 | Status: SHIPPED | OUTPATIENT
Start: 2017-11-07 | End: 2017-12-18 | Stop reason: SDUPTHER

## 2017-11-07 NOTE — PROGRESS NOTES
Sleep Clinic Follow Up    Date: 2017  Primary Care Physician: Valerie Lamb MD      Interim History (1/3):  Since the last visit on 10/26/2015, patient has:      1)  JENA - Has remained compliant with CPAP. She denies mask and machine issues, dry mouth, headaches, pressures intolerance, or non-compliance. She denies abnormal dreams, sleep paralysis, hypnagogic hallucinations, or cataplexy symptoms.       Bed time: varies - not consistent (8619-5504)  Sleep latency: varies   Number of times awakens during the night: 0-3  Wake time: 0681-7823  Estimated total sleep time at night: 8 hours  Caffeine intake: 3 cups of coffee, 1 soda  Alcohol intake: none  Nap time: none  Sleepiness with Driving: none    Kiron - 5    2) Patient denies RLS symptoms.     PMHx, FH, SH reviewed and pertinent changes are: foot fungus versus psoriasis        REVIEW OF SYSTEMS:   Negative for chest pain, fever, chills, SOA, abdominal pain. Smokin ppd      Exam ():    Vitals:    17 1450   BP: 132/72   Pulse: 94   SpO2: 96%     Body mass index is 44.11 kg/(m^2).  Gen:  No distress, conversant, pleasant, appears stated age, alert, oriented  Eyes:   Anicteric sclera, moist conjunctiva, no lid lag     PERRLA, EOMI   Heent:   NC/AT    Oropharynx clear, Mallampati 4, false teeth    normal hearing  Lungs:  Normal effort, non-labored breathing    Clear to auscultation    CV:  Normal S1/S2, no murmur    no lower extremity edema except on feet  ABD:  Soft, normal bowel sounds, obese      Psych:  Appropriate affect  Neuro:  CN 2-12 intact    Past Medical History:   Diagnosis Date   • Arthritis of spine    • Chronic obstructive lung disease    • Cirrhosis    • Depression    • Diabetes mellitus    • Hyperlipidemia    • Hypertension    • Neurologic disorder    • Obesity    • Sleep apnea    • Tobacco dependence        Current Outpatient Prescriptions:   •  B Complex Vitamins (VITAMIN B COMPLEX) capsule capsule, Take 1 capsule by mouth  daily., Disp: , Rfl:   •  B-D UF III MINI PEN NEEDLES 31G X 5 MM misc, , Disp: , Rfl:   •  betamethasone dipropionate (DIPROLENE) 0.05 % ointment, Apply  topically 2 (Two) Times a Day., Disp: 45 g, Rfl: 2  •  cholecalciferol (VITAMIN D3) 1000 UNITS tablet, Take 1,000 Units by mouth daily., Disp: , Rfl:   •  clotrimazole (LOTRIMIN) 1 % cream, Apply  topically 2 (Two) Times a Day., Disp: 45 g, Rfl: 2  •  estradiol (ESTRACE VAGINAL) 0.1 MG/GM vaginal cream, 1/2 applicator 3 times a week, Disp: 42.5 g, Rfl: 1  •  glipiZIDE (GLUCOTROL) 10 MG tablet, TAKE 1 TABLET TWICE DAILY BEFORE MEALS, Disp: 60 tablet, Rfl: 0  •  glucose blood test strip, 1 each by Other route As Needed (as directed). Use as instructed, Disp: 100 each, Rfl: 1  •  LANTUS SOLOSTAR 100 UNIT/ML injection pen, Inject 42 Units under the skin Every Night., Disp: 6 pen, Rfl: 0  •  lisinopril (PRINIVIL,ZESTRIL) 5 MG tablet, Take 0.5 tablets by mouth Daily., Disp: 30 tablet, Rfl: 1  •  lovastatin (MEVACOR) 40 MG tablet, Take 1 tablet by mouth 2 (Two) Times a Day., Disp: 60 tablet, Rfl: 0  •  metFORMIN (GLUCOPHAGE) 1000 MG tablet, Take 1 tablet by mouth 2 (Two) Times a Day With Meals., Disp: 180 tablet, Rfl: 0  •  neomycin-polymyxin-hydrocortisone (CORTISPORIN) 1 % solution otic solution, APPLY UNDER AFFECTED NAILS TWICE DAILY, Disp: , Rfl: 2  •  nicotine (NICODERM CQ) 21 MG/24HR patch, Place 1 patch on the skin every day., Disp: 30 patch, Rfl: 0  •  nystatin (MYCOSTATIN) 171829 UNIT/GM ointment, , Disp: , Rfl:   •  nystatin (MYCOSTATIN) 963201 UNIT/GM powder, Apply  topically 3 (Three) Times a Day., Disp: 45 g, Rfl: 1  •  PARoxetine (PAXIL) 40 MG tablet, Take 1 tablet by mouth Every Morning., Disp: 30 tablet, Rfl: 2  •  traZODone (DESYREL) 50 MG tablet, Take 1 tablet by mouth Every Night., Disp: 30 tablet, Rfl: 5  •  triamcinolone (KENALOG) 0.025 % cream, , Disp: , Rfl:   •  Unable to find, Nystat/HC 1% Zinc 111 Gram, Disp: , Rfl: 2      ASSESSMENT / PLAN:  "    1. Obstructive sleep apnea  1. PSG on 10/11/2012, AHI of 74  2. CPAP titration on same day, recommended 17/11 cm H2O  3. Currently on ? cm H2O  4. Continue PAP as prescribed.   5. Script for PAP supplies  6. Return to clinic in 1 year with compliance check unless sx change in the interim period.  2. Nicotine dependence with complication- given [employer] \"Thinking of quitting smoking\" flyer and referred patient to call 800-QUIT-NOW. Smoking and tobacco use cessation counseling visit was 3 minutes.*      Total time 15 min, more than half spent in face to face counseling and coordination of care.     This document has been electronically signed by Zach Ortiz MD on November 7, 2017         CC: Valerie Lamb MD          No ref. provider found  "

## 2017-11-07 NOTE — TELEPHONE ENCOUNTER
Pt needs a refill on insulin solostar and lovastatin 40 mg 2 x per day sent to [facility].   She has an apt Dec 21 for a 4 mo [facility].

## 2017-11-13 ENCOUNTER — TELEPHONE (OUTPATIENT)
Dept: INTERNAL MEDICINE | Facility: CLINIC | Age: 72
End: 2017-11-13

## 2017-11-13 RX ORDER — PAROXETINE HYDROCHLORIDE 40 MG/1
40 TABLET, FILM COATED ORAL EVERY MORNING
Qty: 30 TABLET | Refills: 2 | Status: SHIPPED | OUTPATIENT
Start: 2017-11-13 | End: 2017-12-21

## 2017-11-28 ENCOUNTER — OFFICE VISIT (OUTPATIENT)
Dept: PODIATRY | Facility: CLINIC | Age: 72
End: 2017-11-28

## 2017-11-28 VITALS — WEIGHT: 241 LBS | BODY MASS INDEX: 41.15 KG/M2 | HEIGHT: 64 IN

## 2017-11-28 DIAGNOSIS — B35.3 TINEA PEDIS OF LEFT FOOT: Primary | ICD-10-CM

## 2017-11-28 DIAGNOSIS — L40.9 PSORIASIS: ICD-10-CM

## 2017-11-28 DIAGNOSIS — M79.672 FOOT PAIN, LEFT: ICD-10-CM

## 2017-11-28 PROCEDURE — 99213 OFFICE O/P EST LOW 20 MIN: CPT | Performed by: PODIATRIST

## 2017-12-06 ENCOUNTER — TELEPHONE (OUTPATIENT)
Dept: INTERNAL MEDICINE | Facility: CLINIC | Age: 72
End: 2017-12-06

## 2017-12-06 RX ORDER — LOVASTATIN 40 MG/1
40 TABLET ORAL 2 TIMES DAILY
Qty: 60 TABLET | Refills: 0 | Status: SHIPPED | OUTPATIENT
Start: 2017-12-06 | End: 2017-12-21 | Stop reason: SDUPTHER

## 2017-12-14 ENCOUNTER — TELEPHONE (OUTPATIENT)
Dept: INTERNAL MEDICINE | Facility: CLINIC | Age: 72
End: 2017-12-14

## 2017-12-18 ENCOUNTER — TELEPHONE (OUTPATIENT)
Dept: INTERNAL MEDICINE | Facility: CLINIC | Age: 72
End: 2017-12-18

## 2017-12-18 RX ORDER — INSULIN GLARGINE 100 [IU]/ML
42 INJECTION, SOLUTION SUBCUTANEOUS NIGHTLY
Qty: 6 PEN | Refills: 3 | Status: SHIPPED | OUTPATIENT
Start: 2017-12-18 | End: 2017-12-21 | Stop reason: SDUPTHER

## 2017-12-21 ENCOUNTER — OFFICE VISIT (OUTPATIENT)
Dept: INTERNAL MEDICINE | Facility: CLINIC | Age: 72
End: 2017-12-21

## 2017-12-21 VITALS
SYSTOLIC BLOOD PRESSURE: 130 MMHG | DIASTOLIC BLOOD PRESSURE: 70 MMHG | BODY MASS INDEX: 42.32 KG/M2 | HEIGHT: 64 IN | WEIGHT: 247.9 LBS

## 2017-12-21 DIAGNOSIS — E78.2 MIXED HYPERLIPIDEMIA: ICD-10-CM

## 2017-12-21 DIAGNOSIS — Z23 FLU VACCINE NEED: ICD-10-CM

## 2017-12-21 DIAGNOSIS — F32.A DEPRESSIVE DISORDER: ICD-10-CM

## 2017-12-21 DIAGNOSIS — Z79.4 TYPE 2 DIABETES MELLITUS WITHOUT COMPLICATION, WITH LONG-TERM CURRENT USE OF INSULIN (HCC): Primary | ICD-10-CM

## 2017-12-21 DIAGNOSIS — F17.200 TOBACCO DEPENDENCE: ICD-10-CM

## 2017-12-21 DIAGNOSIS — I10 ESSENTIAL HYPERTENSION: ICD-10-CM

## 2017-12-21 DIAGNOSIS — E11.9 TYPE 2 DIABETES MELLITUS WITHOUT COMPLICATION, WITH LONG-TERM CURRENT USE OF INSULIN (HCC): Primary | ICD-10-CM

## 2017-12-21 PROCEDURE — 99214 OFFICE O/P EST MOD 30 MIN: CPT | Performed by: INTERNAL MEDICINE

## 2017-12-21 PROCEDURE — 90686 IIV4 VACC NO PRSV 0.5 ML IM: CPT | Performed by: INTERNAL MEDICINE

## 2017-12-21 PROCEDURE — G0008 ADMIN INFLUENZA VIRUS VAC: HCPCS | Performed by: INTERNAL MEDICINE

## 2017-12-21 RX ORDER — GLIPIZIDE 10 MG/1
10 TABLET ORAL
Qty: 180 TABLET | Refills: 1 | Status: SHIPPED | OUTPATIENT
Start: 2017-12-21 | End: 2018-04-19 | Stop reason: SDUPTHER

## 2017-12-21 RX ORDER — INSULIN GLARGINE 100 [IU]/ML
42 INJECTION, SOLUTION SUBCUTANEOUS NIGHTLY
Qty: 6 PEN | Refills: 5 | Status: SHIPPED | OUTPATIENT
Start: 2017-12-21 | End: 2018-02-07 | Stop reason: CLARIF

## 2017-12-21 RX ORDER — TRAZODONE HYDROCHLORIDE 50 MG/1
50 TABLET ORAL NIGHTLY
Qty: 90 TABLET | Refills: 1 | Status: SHIPPED | OUTPATIENT
Start: 2017-12-21 | End: 2018-04-19 | Stop reason: SDUPTHER

## 2017-12-21 RX ORDER — LISINOPRIL 5 MG/1
2.5 TABLET ORAL DAILY
Qty: 90 TABLET | Refills: 1 | Status: SHIPPED | OUTPATIENT
Start: 2017-12-21 | End: 2018-04-19 | Stop reason: SDUPTHER

## 2017-12-21 RX ORDER — FLUOXETINE HYDROCHLORIDE 20 MG/1
CAPSULE ORAL
Qty: 60 CAPSULE | Refills: 2 | Status: SHIPPED | OUTPATIENT
Start: 2017-12-21 | End: 2018-03-26 | Stop reason: SDUPTHER

## 2017-12-21 RX ORDER — LOVASTATIN 40 MG/1
40 TABLET ORAL NIGHTLY
Qty: 90 TABLET | Refills: 1 | Status: SHIPPED | OUTPATIENT
Start: 2017-12-21 | End: 2018-01-08 | Stop reason: SDUPTHER

## 2017-12-21 NOTE — PROGRESS NOTES
Subjective   Michelle Pierce is a 72 y.o. female       Patient is in for recheck on HTN, DM, hyperlipidemia and depression.    Her BP is controlled. She denies any chest pain, dyspnea or edema in lower extremities.  She does not exercise much as her mobility is limited by arthritis in her knees.  Patient has JENA and is compliant with the use of CPAP.    DM, type II.  Not always watching ADA diet.  On Metformin, Glipizide and Lantus.  FBS is below 120, not checking postprandial glucose.  HgBA1c 6.6 /07/20/2017/.  On ACE inhibitor for HTN.  On statin for hyperlipidemia.  Most recent lipid panel with LDL 78 and HDL 45.  Tolerates Lovastatin well without any side effects. No myalgia or myositis. No history of elevated LFTs.  No microvascular or macrovascular complications related to DM.    Patient is having more problems with depression and anxiety. Her mood is low and she is feeling stressed out.   She is taking Paxil and has been on the medication for some time.        Past Medical History:   Diagnosis Date   • Arthritis of spine    • Chronic obstructive lung disease    • Cirrhosis    • Depression    • Diabetes mellitus    • Hyperlipidemia    • Hypertension    • Neurologic disorder    • Obesity    • Sleep apnea    • Tobacco dependence      Past Surgical History:   Procedure Laterality Date   • BACK SURGERY  2013    May 1 or 2, 2013   • COLONOSCOPY  01/21/2005   • COLONOSCOPY N/A 6/1/2017    Procedure: COLONOSCOPY;  Surgeon: Mat Jennings MD;  Location: [facility] ENDOSCOPY;  Service:    • ESOPHAGOSCOPY / EGD  02/25/1998    Mild esophagitis, mild gastritis, the most likely diagnosis for the esophageal inflammation is reflux esophagitis.   • EYE SURGERY      cataract, right eye   • FINGER SURGERY  04/07/1994    Partial amputation, right ring finger   • HYSTERECTOMY     • TUBAL ABDOMINAL LIGATION         Social History   Substance Use Topics   • Smoking status: Current Every Day Smoker     Packs/day: 1.00     Years:  50.00     Types: Cigarettes   • Smokeless tobacco: Never Used      Comment: Patient has been prescribed Nicoderm CQ, but has not begun prescription medication therapy at present time.   • Alcohol use No     Family History   Problem Relation Age of Onset   • Diabetes Other    • Heart disease Other    • Hypertension Other    • Diabetes Mother    • Hypertension Father    • Heart attack Father    • Kidney failure Father    • No Known Problems Maternal Grandmother    • No Known Problems Maternal Grandfather    • No Known Problems Paternal Grandmother    • No Known Problems Paternal Grandfather      Allergies   Allergen Reactions   • Morpholine Salicylate Shortness Of Breath     Morphine allergy, states couldn't breathe   • Morphine And Related      Dyspnea     Current Outpatient Prescriptions on File Prior to Visit   Medication Sig Dispense Refill   • albuterol (VENTOLIN HFA) 108 (90 Base) MCG/ACT inhaler Inhale 2 puffs Every 4 (Four) Hours As Needed for Wheezing. 1 inhaler 0   • B Complex Vitamins (VITAMIN B COMPLEX) capsule capsule Take 1 capsule by mouth daily.     • B-D UF III MINI PEN NEEDLES 31G X 5 MM misc      • betamethasone dipropionate (DIPROLENE) 0.05 % ointment Apply  topically 2 (Two) Times a Day. 45 g 2   • cholecalciferol (VITAMIN D3) 1000 UNITS tablet Take 1,000 Units by mouth daily.     • clotrimazole (LOTRIMIN) 1 % cream Apply  topically 2 (Two) Times a Day. 45 g 2   • estradiol (ESTRACE VAGINAL) 0.1 MG/GM vaginal cream 1/2 applicator 3 times a week 42.5 g 1   • glucose blood test strip 1 each by Other route As Needed (as directed). Use as instructed 100 each 1   • neomycin-polymyxin-hydrocortisone (CORTISPORIN) 1 % solution otic solution APPLY UNDER AFFECTED NAILS TWICE DAILY  2   • nystatin (MYCOSTATIN) 627039 UNIT/GM ointment      • nystatin (MYCOSTATIN) 627593 UNIT/GM powder Apply  topically 3 (Three) Times a Day. 45 g 1   • promethazine-dextromethorphan (PROMETHAZINE-DM) 6.25-15 MG/5ML syrup Take  5 mL by mouth At Night As Needed for Cough. 120 mL 0   • triamcinolone (KENALOG) 0.025 % cream      • Unable to find Nystat/HC 1% Zinc 111 Gram  2   • [DISCONTINUED] glipiZIDE (GLUCOTROL) 10 MG tablet TAKE 1 TABLET TWICE DAILY BEFORE MEALS 60 tablet 0   • [DISCONTINUED] LANTUS SOLOSTAR 100 UNIT/ML injection pen Inject 42 Units under the skin Every Night. 6 pen 3   • [DISCONTINUED] lisinopril (PRINIVIL,ZESTRIL) 5 MG tablet Take 0.5 tablets by mouth Daily. 30 tablet 1   • [DISCONTINUED] lovastatin (MEVACOR) 40 MG tablet Take 1 tablet by mouth 2 (Two) Times a Day. 60 tablet 0   • [DISCONTINUED] metFORMIN (GLUCOPHAGE) 1000 MG tablet Take 1 tablet by mouth 2 (Two) Times a Day With Meals. 60 tablet 0   • [DISCONTINUED] PARoxetine (PAXIL) 40 MG tablet Take 1 tablet by mouth Every Morning. 30 tablet 2   • [DISCONTINUED] traZODone (DESYREL) 50 MG tablet Take 1 tablet by mouth Every Night. 30 tablet 5   • [DISCONTINUED] benzonatate (TESSALON) 200 MG capsule Take 1 capsule by mouth 3 (Three) Times a Day As Needed for Cough. 30 capsule 0   • [DISCONTINUED] nicotine (NICODERM CQ) 21 MG/24HR patch Place 1 patch on the skin every day. 30 patch 0     No current facility-administered medications on file prior to visit.      Review of Systems   Constitutional: Negative for activity change, fatigue and unexpected weight change.   HENT: Negative for mouth sores and nosebleeds.    Eyes: Negative for photophobia and visual disturbance.   Respiratory: Negative for chest tightness and shortness of breath.    Cardiovascular: Negative for palpitations and leg swelling.   Gastrointestinal: Negative for abdominal pain, constipation and diarrhea.   Endocrine: Negative for cold intolerance, heat intolerance, polydipsia and polyuria.   Genitourinary: Negative for dysuria, flank pain and frequency.   Musculoskeletal: Positive for gait problem.   Neurological: Negative for dizziness, light-headedness and headaches.   Psychiatric/Behavioral:  Positive for sleep disturbance. Negative for confusion and decreased concentration. The patient is nervous/anxious.        Objective   Physical Exam   Constitutional: She is oriented to person, place, and time. She appears well-developed and well-nourished.   HENT:   Head: Normocephalic and atraumatic.   Nose: Nose normal.   Mouth/Throat: Oropharynx is clear and moist.   Eyes: Conjunctivae and EOM are normal. Pupils are equal, round, and reactive to light.   Neck: Neck supple. No JVD present. No thyromegaly present.   Cardiovascular: Normal rate and regular rhythm.    No murmur heard.  Pulmonary/Chest: Effort normal and breath sounds normal. She has no wheezes. She has no rales.   Abdominal: Soft. Bowel sounds are normal. She exhibits no mass.       Neurological Sensory Findings -  Unaltered sharp/dull right ankle/foot discrimination and unaltered sharp/dull left ankle/foot discrimination.    Vascular Status -  Her exam exhibits right foot vasculature normal. Her exam exhibits left foot vasculature normal.  Neurological: She is alert and oriented to person, place, and time. She has normal reflexes.   Skin: Skin is warm and dry. Rash noted.   Scaling on the plantar surface of both feet   Psychiatric: She has a normal mood and affect. Her behavior is normal.   Nursing note and vitals reviewed.          Patient Active Problem List   Diagnosis   • Diabetes mellitus, type II, insulin dependent   • Essential hypertension   • Depressive disorder   • Tobacco dependence   • Sleep apnea   • Skin cancer         Orders Only on 10/04/2017   Component Date Value Ref Range Status   • Case Report 10/04/2017    Final                    Value:Surgical Pathology Report                         Case: [id number]                                  Authorizing Provider:  Yosef Arredondo MD            Collected:           10/04/2017 03:15 PM          Ordering Location:     [facility]     Received:            10/05/2017 08:18 AM                                  [facility] GENERAL SURGERY                                                        Pathologist:           Ricky Alcaraz MD                                                          Specimens:   1) - Forearm, Right                                                                                 2) - Forearm, Right                                                                       • Final Diagnosis 10/04/2017    Final                    Value:This result contains rich text formatting which cannot be displayed here.   • Gross Description 10/04/2017    Final                    Value:This result contains rich text formatting which cannot be displayed here.   Office Visit on 07/20/2017   Component Date Value Ref Range Status   • Glucose 07/20/2017 94  60 - 100 mg/dL Final   • BUN 07/20/2017 13  7 - 21 mg/dL Final   • Creatinine 07/20/2017 0.85  0.50 - 1.00 mg/dL Final   • Sodium 07/20/2017 137  137 - 145 mmol/L Final   • Potassium 07/20/2017 4.7  3.5 - 5.1 mmol/L Final   • Chloride 07/20/2017 99  95 - 110 mmol/L Final   • CO2 07/20/2017 25.0  22.0 - 31.0 mmol/L Final   • Calcium 07/20/2017 9.8  8.4 - 10.2 mg/dL Final   • Total Protein 07/20/2017 8.0  6.3 - 8.6 g/dL Final   • Albumin 07/20/2017 4.40  3.40 - 4.80 g/dL Final   • ALT (SGPT) 07/20/2017 25  9 - 52 U/L Final   • AST (SGOT) 07/20/2017 29  14 - 36 U/L Final   • Alkaline Phosphatase 07/20/2017 97  38 - 126 U/L Final   • Total Bilirubin 07/20/2017 0.2  0.2 - 1.3 mg/dL Final   • eGFR Non  Amer 07/20/2017 66  45 - 104 mL/min/1.73 Final   • Globulin 07/20/2017 3.6* 2.3 - 3.5 gm/dL Final   • A/G Ratio 07/20/2017 1.2  1.1 - 1.8 g/dL Final   • BUN/Creatinine Ratio 07/20/2017 15.3  7.0 - 25.0 Final   • Anion Gap 07/20/2017 13.0  5.0 - 15.0 mmol/L Final   • Hemoglobin A1C 07/20/2017 6.60* 4 - 5.6 % Final   • Microalbumin/Creatinine Ratio 07/20/2017 16.0  0.0 - 30.0 mg/g Final   • Creatinine, Urine 07/20/2017 137.7  mg/dL Final   •  Microalbumin, Urine 07/20/2017 2.2  mg/L Final   • Glucose 07/20/2017 55* 70 - 130 mg/dL Final   Admission on 06/01/2017, Discharged on 06/01/2017   Component Date Value Ref Range Status   • Case Report 06/01/2017    Final                    Value:Surgical Pathology Report                         Case: [id number]                                  Authorizing Provider:  Mat Jennings MD    Collected:           06/01/2017 09:23 AM          Ordering Location:     [facility]             Received:            06/01/2017 11:25 AM                                 [facility]                                                     Pathologist:           Tyler Faust MD                                                            Specimens:   1) - Large Intestine, 40cm hot snare                                                                2) - Large Intestine, Transverse Colon, cold snare                                                  3) - Large Intestine, hepatic flexure                                                               4) - Large Intestine, illeocecal valve                                                              5) - Large Intestine, Cecum, cecum polyp                                                                                      6) - Large Intestine, Right / Ascending Colon                                                       7) - Large Intestine, splenic flexure                                                               8) - Large Intestine, Rectum                                                              • Final Diagnosis 06/01/2017    Final                    Value:This result contains rich text formatting which cannot be displayed here.   • Gross Description 06/01/2017    Final                    Value:This result contains rich text formatting which cannot be displayed here.           Assessment/Plan   Michelle was seen today for diabetes.    Diagnoses and all orders  for this visit:    Type 2 diabetes mellitus without complication, with long-term current use of insulin  -     Comprehensive Metabolic Panel  -     Hemoglobin A1c  -     glipiZIDE (GLUCOTROL) 10 MG tablet; Take 1 tablet by mouth 2 (Two) Times a Day Before Meals.    Essential hypertension    Mixed hyperlipidemia  -     Lipid Panel    Depressive disorder    Tobacco dependence    Other orders  -     traZODone (DESYREL) 50 MG tablet; Take 1 tablet by mouth Every Night.  -     metFORMIN (GLUCOPHAGE) 1000 MG tablet; Take 1 tablet by mouth 2 (Two) Times a Day With Meals.  -     LANTUS SOLOSTAR 100 UNIT/ML injection pen; Inject 42 Units under the skin Every Night.  -     lovastatin (MEVACOR) 40 MG tablet; Take 1 tablet by mouth Every Night.  -     lisinopril (PRINIVIL,ZESTRIL) 5 MG tablet; Take 0.5 tablets by mouth Daily.  -     FLUoxetine (PROzac) 20 MG capsule; 1 PO daily for 2 weeks, then 2 PO daily             Plan        1. Patient will continue Metformin, Glipizide and Lantus.     ADA diet.     Will recheck HgbA1c.     Discussed goals of diabetic control.  2. Lisinopril is continued.      DASH.      1.5 gr Sodium restriction.  3. Lipids will be rechecked.      Lovastatin is continued.      Counseled on calories restricted diet and weight loss.  4. Patient will be placed on Prozac.      Medications gabrielle effects discussed.      She will discontinue Paxil.  4. Advised to quit smoking. Patient does not think she can quit or reduce at present.          Follow up in 3 months.          This document has been electronically signed by Valerie Lamb MD on December 21, 2017 2:33 PM

## 2018-01-08 RX ORDER — LOVASTATIN 40 MG/1
40 TABLET ORAL 2 TIMES DAILY
Qty: 180 TABLET | Refills: 0 | Status: SHIPPED | OUTPATIENT
Start: 2018-01-08 | End: 2018-04-06 | Stop reason: SDUPTHER

## 2018-02-07 RX ORDER — INSULIN GLARGINE 100 [IU]/ML
42 INJECTION, SOLUTION SUBCUTANEOUS NIGHTLY
Qty: 3 PEN | Refills: 3 | Status: SHIPPED | OUTPATIENT
Start: 2018-02-07 | End: 2018-04-19 | Stop reason: SDUPTHER

## 2018-03-19 RX ORDER — BLOOD SUGAR DIAGNOSTIC
STRIP MISCELLANEOUS
Qty: 100 EACH | Refills: 1 | Status: SHIPPED | OUTPATIENT
Start: 2018-03-19 | End: 2018-08-21 | Stop reason: SDUPTHER

## 2018-03-26 RX ORDER — FLUOXETINE HYDROCHLORIDE 20 MG/1
CAPSULE ORAL
Qty: 60 CAPSULE | Refills: 0 | Status: SHIPPED | OUTPATIENT
Start: 2018-03-26 | End: 2018-04-19 | Stop reason: SINTOL

## 2018-04-02 ENCOUNTER — APPOINTMENT (OUTPATIENT)
Dept: LAB | Facility: HOSPITAL | Age: 73
End: 2018-04-02

## 2018-04-02 LAB
ALBUMIN SERPL-MCNC: 4 G/DL (ref 3.4–4.8)
ALBUMIN/GLOB SERPL: 1.2 G/DL (ref 1.1–1.8)
ALP SERPL-CCNC: 79 U/L (ref 38–126)
ALT SERPL W P-5'-P-CCNC: 27 U/L (ref 9–52)
ANION GAP SERPL CALCULATED.3IONS-SCNC: 14 MMOL/L (ref 5–15)
ARTICHOKE IGE QN: 83 MG/DL (ref 1–129)
AST SERPL-CCNC: 29 U/L (ref 14–36)
BILIRUB SERPL-MCNC: 0.2 MG/DL (ref 0.2–1.3)
BUN BLD-MCNC: 11 MG/DL (ref 7–21)
BUN/CREAT SERPL: 14.7 (ref 7–25)
CALCIUM SPEC-SCNC: 9.4 MG/DL (ref 8.4–10.2)
CHLORIDE SERPL-SCNC: 102 MMOL/L (ref 95–110)
CHOLEST SERPL-MCNC: 170 MG/DL (ref 0–199)
CO2 SERPL-SCNC: 25 MMOL/L (ref 22–31)
CREAT BLD-MCNC: 0.75 MG/DL (ref 0.5–1)
GFR SERPL CREATININE-BSD FRML MDRD: 76 ML/MIN/1.73 (ref 39–90)
GLOBULIN UR ELPH-MCNC: 3.4 GM/DL (ref 2.3–3.5)
GLUCOSE BLD-MCNC: 104 MG/DL (ref 60–100)
HBA1C MFR BLD: 7.2 % (ref 4–5.6)
HDLC SERPL-MCNC: 48 MG/DL (ref 60–200)
LDLC/HDLC SERPL: 2.05 {RATIO} (ref 0–3.22)
POTASSIUM BLD-SCNC: 4.7 MMOL/L (ref 3.5–5.1)
PROT SERPL-MCNC: 7.4 G/DL (ref 6.3–8.6)
SODIUM BLD-SCNC: 141 MMOL/L (ref 137–145)
TRIGL SERPL-MCNC: 119 MG/DL (ref 20–199)

## 2018-04-02 PROCEDURE — 83036 HEMOGLOBIN GLYCOSYLATED A1C: CPT | Performed by: INTERNAL MEDICINE

## 2018-04-02 PROCEDURE — 80061 LIPID PANEL: CPT | Performed by: INTERNAL MEDICINE

## 2018-04-02 PROCEDURE — 80053 COMPREHEN METABOLIC PANEL: CPT | Performed by: INTERNAL MEDICINE

## 2018-04-02 PROCEDURE — 36415 COLL VENOUS BLD VENIPUNCTURE: CPT | Performed by: INTERNAL MEDICINE

## 2018-04-06 ENCOUNTER — TELEPHONE (OUTPATIENT)
Dept: INTERNAL MEDICINE | Facility: CLINIC | Age: 73
End: 2018-04-06

## 2018-04-06 RX ORDER — LOVASTATIN 40 MG/1
40 TABLET ORAL 2 TIMES DAILY
Qty: 60 TABLET | Refills: 0 | Status: SHIPPED | OUTPATIENT
Start: 2018-04-06 | End: 2018-04-19 | Stop reason: SDUPTHER

## 2018-04-19 ENCOUNTER — OFFICE VISIT (OUTPATIENT)
Dept: INTERNAL MEDICINE | Facility: CLINIC | Age: 73
End: 2018-04-19

## 2018-04-19 VITALS
DIASTOLIC BLOOD PRESSURE: 60 MMHG | SYSTOLIC BLOOD PRESSURE: 120 MMHG | BODY MASS INDEX: 42.87 KG/M2 | HEIGHT: 64 IN | WEIGHT: 251.1 LBS

## 2018-04-19 DIAGNOSIS — E11.9 TYPE 2 DIABETES MELLITUS WITHOUT COMPLICATION, WITH LONG-TERM CURRENT USE OF INSULIN (HCC): Primary | ICD-10-CM

## 2018-04-19 DIAGNOSIS — I10 ESSENTIAL HYPERTENSION: ICD-10-CM

## 2018-04-19 DIAGNOSIS — F32.A DEPRESSIVE DISORDER: ICD-10-CM

## 2018-04-19 DIAGNOSIS — M17.0 OSTEOARTHRITIS OF BOTH KNEES, UNSPECIFIED OSTEOARTHRITIS TYPE: ICD-10-CM

## 2018-04-19 DIAGNOSIS — Z79.4 TYPE 2 DIABETES MELLITUS WITHOUT COMPLICATION, WITH LONG-TERM CURRENT USE OF INSULIN (HCC): Primary | ICD-10-CM

## 2018-04-19 DIAGNOSIS — F17.200 TOBACCO DEPENDENCE: ICD-10-CM

## 2018-04-19 PROCEDURE — 99214 OFFICE O/P EST MOD 30 MIN: CPT | Performed by: INTERNAL MEDICINE

## 2018-04-19 RX ORDER — ESCITALOPRAM OXALATE 10 MG/1
10 TABLET ORAL DAILY
Qty: 30 TABLET | Refills: 2 | Status: SHIPPED | OUTPATIENT
Start: 2018-04-19 | End: 2018-08-21 | Stop reason: SDUPTHER

## 2018-04-19 RX ORDER — TRAZODONE HYDROCHLORIDE 50 MG/1
50 TABLET ORAL NIGHTLY
Qty: 90 TABLET | Refills: 1 | Status: SHIPPED | OUTPATIENT
Start: 2018-04-19 | End: 2018-08-21 | Stop reason: SDUPTHER

## 2018-04-19 RX ORDER — GLIPIZIDE 10 MG/1
10 TABLET ORAL
Qty: 180 TABLET | Refills: 1 | Status: SHIPPED | OUTPATIENT
Start: 2018-04-19 | End: 2018-04-19 | Stop reason: SDUPTHER

## 2018-04-19 RX ORDER — GLIPIZIDE 10 MG/1
TABLET ORAL
Qty: 180 TABLET | Refills: 1 | Status: SHIPPED | OUTPATIENT
Start: 2018-04-19 | End: 2018-05-08

## 2018-04-19 RX ORDER — INSULIN GLARGINE 100 [IU]/ML
45 INJECTION, SOLUTION SUBCUTANEOUS NIGHTLY
Qty: 3 PEN | Refills: 1 | Status: SHIPPED | OUTPATIENT
Start: 2018-04-19 | End: 2018-07-17 | Stop reason: SDUPTHER

## 2018-04-19 RX ORDER — LISINOPRIL 5 MG/1
2.5 TABLET ORAL DAILY
Qty: 90 TABLET | Refills: 1 | Status: SHIPPED | OUTPATIENT
Start: 2018-04-19 | End: 2018-08-21 | Stop reason: SDUPTHER

## 2018-04-19 RX ORDER — LOVASTATIN 40 MG/1
40 TABLET ORAL NIGHTLY
Qty: 180 TABLET | Refills: 1 | Status: SHIPPED | OUTPATIENT
Start: 2018-04-19 | End: 2018-08-21 | Stop reason: SDUPTHER

## 2018-04-19 NOTE — PROGRESS NOTES
Subjective   Michelle Pierce is a 72 y.o. female who comes for recheck    Diabetes   She presents for her follow-up diabetic visit. She has type 2 diabetes mellitus. Her disease course has been fluctuating. Hypoglycemia symptoms include nervousness/anxiousness. Pertinent negatives for diabetes include no chest pain, no fatigue, no polydipsia and no polyuria. Symptoms are stable. Pertinent negatives for diabetic complications include no impotence. Risk factors for coronary artery disease include diabetes mellitus, dyslipidemia, hypertension and sedentary lifestyle. Current diabetic treatment includes diet, insulin injections and oral agent (dual therapy). She is compliant with treatment most of the time. She is currently taking insulin at bedtime. Insulin injections are given by patient. She is following a generally healthy diet. When asked about meal planning, she reported none. She has not had a previous visit with a dietitian. She rarely participates in exercise. She monitors blood glucose at home 1-2 x per day. Blood glucose monitoring compliance is excellent. Her breakfast blood glucose range is generally 130-140 mg/dl. An ACE inhibitor/angiotensin II receptor blocker is being taken. She does not see a podiatrist.  Hypertension   This is a chronic problem. The current episode started more than 1 year ago. The problem is controlled. Pertinent negatives include no chest pain, palpitations or shortness of breath. Current antihypertension treatment includes ACE inhibitors. There are no compliance problems.    Depression   Visit Type: follow-up  Patient presents with the following symptoms: decreased concentration, excessive worry and nervousness/anxiety.  Patient is not experiencing: compulsions, impotence, muscle tension, palpitations, psychomotor agitation, psychomotor retardation, shortness of breath, suicidal ideas and thoughts of death.  Sleep quality: fair  Compliance with medications:  %          Patient  is still having problems with depression. On Prozac, and thinks that it causing increased anxiety.  She is still smoking and not ready to quit.      Also complains of bilateral knee and would like to see ortho.  Has known DJD of both knees and had injections in the past    Past Medical History:   Diagnosis Date   • Arthritis of spine    • Chronic obstructive lung disease    • Cirrhosis    • Depression    • Diabetes mellitus    • Hyperlipidemia    • Hypertension    • Neurologic disorder    • Obesity    • Sleep apnea    • Tobacco dependence      Past Surgical History:   Procedure Laterality Date   • BACK SURGERY  2013    May 1 or 2, 2013   • COLONOSCOPY  01/21/2005   • COLONOSCOPY N/A 6/1/2017    Procedure: COLONOSCOPY;  Surgeon: Mat Jennings MD;  Location: [facility] ENDOSCOPY;  Service:    • ESOPHAGOSCOPY / EGD  02/25/1998    Mild esophagitis, mild gastritis, the most likely diagnosis for the esophageal inflammation is reflux esophagitis.   • EYE SURGERY      cataract, right eye   • FINGER SURGERY  04/07/1994    Partial amputation, right ring finger   • HYSTERECTOMY     • TUBAL ABDOMINAL LIGATION         Social History   Substance Use Topics   • Smoking status: Current Every Day Smoker     Packs/day: 1.00     Years: 50.00     Types: Cigarettes   • Smokeless tobacco: Never Used      Comment: Patient has been prescribed Nicoderm CQ, but has not begun prescription medication therapy at present time.   • Alcohol use No     Family History   Problem Relation Age of Onset   • Diabetes Other    • Heart disease Other    • Hypertension Other    • Diabetes Mother    • Hypertension Father    • Heart attack Father    • Kidney failure Father    • No Known Problems Maternal Grandmother    • No Known Problems Maternal Grandfather    • No Known Problems Paternal Grandmother    • No Known Problems Paternal Grandfather      Allergies   Allergen Reactions   • Morpholine Salicylate Shortness Of Breath     Morphine allergy, states  couldn't breathe   • Morphine And Related      Dyspnea   • Morphine Sulfate Unknown (See Comments)     Current Outpatient Prescriptions on File Prior to Visit   Medication Sig Dispense Refill   • ACCU-CHEK KAYKAY PLUS test strip TEST TWICE DAILY AS DIRECTED 100 each 1   • albuterol (VENTOLIN HFA) 108 (90 Base) MCG/ACT inhaler Inhale 2 puffs Every 4 (Four) Hours As Needed for Wheezing. 1 inhaler 0   • B Complex Vitamins (VITAMIN B COMPLEX) capsule capsule Take 1 capsule by mouth daily.     • B-D UF III MINI PEN NEEDLES 31G X 5 MM misc      • betamethasone dipropionate (DIPROLENE) 0.05 % ointment Apply  topically 2 (Two) Times a Day. 45 g 2   • cholecalciferol (VITAMIN D3) 1000 UNITS tablet Take 1,000 Units by mouth daily.     • clotrimazole (LOTRIMIN) 1 % cream Apply  topically 2 (Two) Times a Day. 45 g 2   • estradiol (ESTRACE VAGINAL) 0.1 MG/GM vaginal cream 1/2 applicator 3 times a week 42.5 g 1   • neomycin-polymyxin-hydrocortisone (CORTISPORIN) 1 % solution otic solution APPLY UNDER AFFECTED NAILS TWICE DAILY  2   • nystatin (MYCOSTATIN) 084613 UNIT/GM ointment      • nystatin (MYCOSTATIN) 287573 UNIT/GM powder Apply  topically 3 (Three) Times a Day. 45 g 1   • promethazine-dextromethorphan (PROMETHAZINE-DM) 6.25-15 MG/5ML syrup Take 5 mL by mouth At Night As Needed for Cough. 120 mL 0   • triamcinolone (KENALOG) 0.025 % cream      • Unable to find Nystat/HC 1% Zinc 111 Gram  2   • [DISCONTINUED] FLUoxetine (PROzac) 20 MG capsule TAKE 1 CAPSULE BY MOUTH DAILY FOR 2 WEEKS THEN 2 CAPSULES DAILY 60 capsule 0   • [DISCONTINUED] glipiZIDE (GLUCOTROL) 10 MG tablet Take 1 tablet by mouth 2 (Two) Times a Day Before Meals. 180 tablet 1   • [DISCONTINUED] Insulin Glargine (BASAGLAR KWIKPEN) 100 UNIT/ML injection pen Inject 42 Units under the skin Every Night. 3 pen 3   • [DISCONTINUED] lisinopril (PRINIVIL,ZESTRIL) 5 MG tablet Take 0.5 tablets by mouth Daily. 90 tablet 1   • [DISCONTINUED] lovastatin (MEVACOR) 40 MG tablet  Take 1 tablet by mouth 2 (Two) Times a Day. 60 tablet 0   • [DISCONTINUED] metFORMIN (GLUCOPHAGE) 1000 MG tablet Take 1 tablet by mouth 2 (Two) Times a Day With Meals. 180 tablet 1   • [DISCONTINUED] traZODone (DESYREL) 50 MG tablet Take 1 tablet by mouth Every Night. 90 tablet 1     No current facility-administered medications on file prior to visit.      Review of Systems   Constitutional: Negative for activity change, fatigue and unexpected weight change.   Respiratory: Negative for chest tightness and shortness of breath.    Cardiovascular: Negative for chest pain, palpitations and leg swelling.   Gastrointestinal: Negative for abdominal pain, constipation and diarrhea.   Endocrine: Negative for cold intolerance, heat intolerance, polydipsia and polyuria.   Genitourinary: Negative for flank pain, frequency and impotence.   Musculoskeletal: Positive for back pain and gait problem.   Hematological: Negative for adenopathy. Does not bruise/bleed easily.   Psychiatric/Behavioral: Positive for decreased concentration. Negative for suicidal ideas. The patient is nervous/anxious.        Objective   Physical Exam   Constitutional: She is oriented to person, place, and time. She appears well-developed and well-nourished.   HENT:   Nose: Nose normal.   Mouth/Throat: Oropharynx is clear and moist.   Eyes: Conjunctivae are normal. Pupils are equal, round, and reactive to light.   Neck: Normal range of motion. Neck supple.   Cardiovascular: Normal rate and regular rhythm.    No murmur heard.  Pulmonary/Chest: Effort normal and breath sounds normal.   Abdominal: Soft. Bowel sounds are normal. She exhibits no mass.   Musculoskeletal: She exhibits no edema.        Right knee: She exhibits decreased range of motion. She exhibits no swelling, no effusion and no erythema.   Neurological: She is alert and oriented to person, place, and time. She has normal reflexes.   Skin: Skin is warm and dry. No rash noted.   Nursing note  reviewed.          Patient Active Problem List   Diagnosis   • Diabetes mellitus, type II, insulin dependent   • Essential hypertension   • Depressive disorder   • Tobacco dependence   • Sleep apnea   • Skin cancer         Lab Results   Component Value Date    HGBA1C 7.2 (H) 04/02/2018    HGBA1C 6.60 (H) 07/20/2017    HGBA1C 7.44 (H) 03/23/2017         Lab Results   Component Value Date    LDL 83 04/02/2018    LDL 78 03/23/2017    LDL 93 11/05/2015     Lab Results   Component Value Date    BUN 11 04/02/2018    BUN 13 07/20/2017    BUN 10 03/23/2017     Lab Results   Component Value Date    CREATININE 0.75 04/02/2018    CREATININE 0.85 07/20/2017    CREATININE 0.74 03/23/2017         Assessment/Plan   Michelle was seen today for follow-up.    Diagnoses and all orders for this visit:    Type 2 diabetes mellitus without complication, with long-term current use of insulin  -     Discontinue: glipiZIDE (GLUCOTROL) 10 MG tablet; Take 1 tablet by mouth 2 (Two) Times a Day Before Meals.  -     glipiZIDE (GLUCOTROL) 10 MG tablet; With meal or food    Essential hypertension    Depressive disorder    Tobacco dependence    Osteoarthritis of both knees, unspecified osteoarthritis type  -     Ambulatory Referral to Orthopedic Surgery    Other orders  -     Insulin Glargine (BASAGLAR KWIKPEN) 100 UNIT/ML injection pen; Inject 45 Units under the skin Every Night.  -     lisinopril (PRINIVIL,ZESTRIL) 5 MG tablet; Take 0.5 tablets by mouth Daily.  -     lovastatin (MEVACOR) 40 MG tablet; Take 1 tablet by mouth Every Night.  -     metFORMIN (GLUCOPHAGE) 1000 MG tablet; Take 1 tablet by mouth 2 (Two) Times a Day With Meals.  -     traZODone (DESYREL) 50 MG tablet; Take 1 tablet by mouth Every Night.  -     escitalopram (LEXAPRO) 10 MG tablet; Take 1 tablet by mouth Daily.             Plan       1. Increase Basaglar to 45 untis QHS.  Decrease 5 mg PO bid in view of occasional hypoglycemia/  Metformin 1000 mg bid.  Weight loss advised.  2.  Lisinopril 2.5 mg daily.  DASH.  3. Will DC Prozac.  Lexapro 10 mg daily - medication side effects discussed.  Trazodone 50 mg qhs for insomnia.  4.I advised the patient of the risks in continuing to use tobacco, and I provided this patient with smoking cessation educational materials.    During this visit, I spent 3 minutes counseling the patient regarding smoking cessation.  5. Referral to ortho, per her request and as clinically indicated.      Follow up in 3 months.          This document has been electronically signed by Valerie Lamb MD on April 19, 2018 3:06 PM

## 2018-04-23 NOTE — PATIENT INSTRUCTIONS

## 2018-05-07 DIAGNOSIS — M25.561 PAIN IN BOTH KNEES, UNSPECIFIED CHRONICITY: Primary | ICD-10-CM

## 2018-05-07 DIAGNOSIS — M25.562 PAIN IN BOTH KNEES, UNSPECIFIED CHRONICITY: Primary | ICD-10-CM

## 2018-05-07 RX ORDER — VENLAFAXINE HYDROCHLORIDE 37.5 MG/1
37.5 CAPSULE, EXTENDED RELEASE ORAL 2 TIMES DAILY
Qty: 60 CAPSULE | Refills: 1 | Status: SHIPPED | OUTPATIENT
Start: 2018-05-07 | End: 2018-05-09 | Stop reason: CLARIF

## 2018-05-08 ENCOUNTER — TELEPHONE (OUTPATIENT)
Dept: INTERNAL MEDICINE | Facility: CLINIC | Age: 73
End: 2018-05-08

## 2018-05-08 ENCOUNTER — OFFICE VISIT (OUTPATIENT)
Dept: ORTHOPEDIC SURGERY | Facility: CLINIC | Age: 73
End: 2018-05-08

## 2018-05-08 VITALS — WEIGHT: 251 LBS | HEIGHT: 64 IN | BODY MASS INDEX: 42.85 KG/M2

## 2018-05-08 DIAGNOSIS — M25.562 PAIN IN BOTH KNEES, UNSPECIFIED CHRONICITY: Primary | ICD-10-CM

## 2018-05-08 DIAGNOSIS — M17.0 PRIMARY OSTEOARTHRITIS OF BOTH KNEES: ICD-10-CM

## 2018-05-08 DIAGNOSIS — Z79.4 DIABETES MELLITUS, TYPE II, INSULIN DEPENDENT (HCC): ICD-10-CM

## 2018-05-08 DIAGNOSIS — M25.561 PAIN IN BOTH KNEES, UNSPECIFIED CHRONICITY: Primary | ICD-10-CM

## 2018-05-08 DIAGNOSIS — E66.01 MORBID OBESITY WITH BMI OF 40.0-44.9, ADULT (HCC): ICD-10-CM

## 2018-05-08 DIAGNOSIS — E11.9 DIABETES MELLITUS, TYPE II, INSULIN DEPENDENT (HCC): ICD-10-CM

## 2018-05-08 DIAGNOSIS — F17.210 HEAVY CIGARETTE SMOKER (20-39 PER DAY): ICD-10-CM

## 2018-05-08 DIAGNOSIS — I10 ESSENTIAL HYPERTENSION: ICD-10-CM

## 2018-05-08 PROCEDURE — 99214 OFFICE O/P EST MOD 30 MIN: CPT | Performed by: ORTHOPAEDIC SURGERY

## 2018-05-08 PROCEDURE — 99406 BEHAV CHNG SMOKING 3-10 MIN: CPT | Performed by: ORTHOPAEDIC SURGERY

## 2018-05-08 NOTE — PROGRESS NOTES
Michelle Pierce is a 72 y.o. female   Primary provider:  Valerie Lamb MD       Chief Complaint   Patient presents with   • Left Knee - Pain     Xray today   • Right Knee - Pain     Xray today       HISTORY OF PRESENT ILLNESS:    Pain   This is a recurrent problem. The current episode started more than 1 year ago. The problem occurs constantly. Associated symptoms include arthralgias and coughing. Associated symptoms comments: Grinding, aching, popping. The symptoms are aggravated by walking and standing.     Pain with activity.  Mostly dull ache but has sharp pains at times.  Difficulty with ADL's.  Using scooter most of the time.  Problems standing for any length of time.  1 pack per day smoker.  No specific injury   Pain has been getting worse for many years.       CONCURRENT MEDICAL HISTORY:    Past Medical History:   Diagnosis Date   • Arthritis of spine    • Chronic obstructive lung disease    • Cirrhosis    • Depression    • Diabetes mellitus    • Hyperlipidemia    • Hypertension    • Neurologic disorder    • Obesity    • Sleep apnea    • Tobacco dependence        Allergies   Allergen Reactions   • Morpholine Salicylate Shortness Of Breath     Morphine allergy, states couldn't breathe   • Morphine And Related      Dyspnea   • Morphine Sulfate Unknown (See Comments)   • Prozac [Fluoxetine Hcl] Anxiety         Current Outpatient Prescriptions:   •  ACCU-CHEK KAYKAY PLUS test strip, TEST TWICE DAILY AS DIRECTED, Disp: 100 each, Rfl: 1  •  albuterol (VENTOLIN HFA) 108 (90 Base) MCG/ACT inhaler, Inhale 2 puffs Every 4 (Four) Hours As Needed for Wheezing., Disp: 1 inhaler, Rfl: 0  •  B Complex Vitamins (VITAMIN B COMPLEX) capsule capsule, Take 1 capsule by mouth daily., Disp: , Rfl:   •  B-D UF III MINI PEN NEEDLES 31G X 5 MM misc, , Disp: , Rfl:   •  betamethasone dipropionate (DIPROLENE) 0.05 % ointment, Apply  topically 2 (Two) Times a Day., Disp: 45 g, Rfl: 2  •  cholecalciferol (VITAMIN D3) 1000 UNITS tablet,  Take 1,000 Units by mouth daily., Disp: , Rfl:   •  clotrimazole (LOTRIMIN) 1 % cream, Apply  topically 2 (Two) Times a Day., Disp: 45 g, Rfl: 2  •  escitalopram (LEXAPRO) 10 MG tablet, Take 1 tablet by mouth Daily., Disp: 30 tablet, Rfl: 2  •  estradiol (ESTRACE VAGINAL) 0.1 MG/GM vaginal cream, 1/2 applicator 3 times a week, Disp: 42.5 g, Rfl: 1  •  Insulin Glargine (BASAGLAR KWIKPEN) 100 UNIT/ML injection pen, Inject 45 Units under the skin Every Night., Disp: 3 pen, Rfl: 1  •  lisinopril (PRINIVIL,ZESTRIL) 5 MG tablet, Take 0.5 tablets by mouth Daily., Disp: 90 tablet, Rfl: 1  •  lovastatin (MEVACOR) 40 MG tablet, Take 1 tablet by mouth Every Night., Disp: 180 tablet, Rfl: 1  •  metFORMIN (GLUCOPHAGE) 1000 MG tablet, Take 1 tablet by mouth 2 (Two) Times a Day With Meals., Disp: 180 tablet, Rfl: 2  •  traZODone (DESYREL) 50 MG tablet, Take 1 tablet by mouth Every Night., Disp: 90 tablet, Rfl: 1  •  triamcinolone (KENALOG) 0.025 % cream, , Disp: , Rfl:   •  Unable to find, Nystat/HC 1% Zinc 111 Gram, Disp: , Rfl: 2  •  venlafaxine XR (EFFEXOR-XR) 75 MG 24 hr capsule, Take 1 capsule by mouth Daily., Disp: 30 capsule, Rfl: 1    Past Surgical History:   Procedure Laterality Date   • BACK SURGERY  2013    May 1 or 2, 2013   • COLONOSCOPY  01/21/2005   • COLONOSCOPY N/A 6/1/2017    Procedure: COLONOSCOPY;  Surgeon: Mat Jennings MD;  Location: [facility] ENDOSCOPY;  Service:    • ESOPHAGOSCOPY / EGD  02/25/1998    Mild esophagitis, mild gastritis, the most likely diagnosis for the esophageal inflammation is reflux esophagitis.   • EYE SURGERY      cataract, right eye   • FINGER SURGERY  04/07/1994    Partial amputation, right ring finger   • HYSTERECTOMY     • TUBAL ABDOMINAL LIGATION         Family History   Problem Relation Age of Onset   • Diabetes Other    • Heart disease Other    • Hypertension Other    • Diabetes Mother    • Hypertension Father    • Heart attack Father    • Kidney failure Father    • No Known  "Problems Maternal Grandmother    • No Known Problems Maternal Grandfather    • No Known Problems Paternal Grandmother    • No Known Problems Paternal Grandfather        Social History     Social History   • Marital status:      Spouse name: N/A   • Number of children: N/A   • Years of education: N/A     Occupational History   • Retired      Grandson resides with patient.     Social History Main Topics   • Smoking status: Current Every Day Smoker     Packs/day: 1.00     Years: 50.00     Types: Cigarettes   • Smokeless tobacco: Never Used      Comment: Patient has been prescribed Nicoderm CQ, but has not begun prescription medication therapy at present time.   • Alcohol use No   • Drug use: No   • Sexual activity: Defer     Other Topics Concern   • Not on file     Social History Narrative   • No narrative on file        Review of Systems   Constitutional: Negative.    HENT: Negative.    Eyes: Negative.    Respiratory: Positive for cough and wheezing.    Cardiovascular: Negative.    Gastrointestinal: Negative.    Endocrine: Negative.    Genitourinary: Negative.    Musculoskeletal: Positive for arthralgias and gait problem.   Skin: Negative.    Allergic/Immunologic: Negative.    Hematological: Bruises/bleeds easily.   Psychiatric/Behavioral: Negative.        PHYSICAL EXAMINATION:       Ht 162.6 cm (64\")   Wt 114 kg (251 lb)   BMI 43.08 kg/m²     Physical Exam   Constitutional: She is oriented to person, place, and time. She appears well-developed and well-nourished. No distress.   Eyes: Conjunctivae are normal. Pupils are equal, round, and reactive to light.   Neck: Neck supple. No tracheal deviation present. No thyromegaly present.   Cardiovascular: Normal rate and intact distal pulses.    Pulmonary/Chest: Effort normal. She exhibits no tenderness.   Abdominal: Soft. She exhibits no distension and no mass. There is no tenderness.   Neurological: She is alert and oriented to person, place, and time.   Skin: " Skin is warm. No rash noted.   Psychiatric: She has a normal mood and affect. Her behavior is normal. Judgment and thought content normal.       GAIT:     [x]  Normal  []  Antalgic    Assistive device: []  None  []  Walker     []  Crutches  []  Cane     [x]  Wheelchair  []  Stretcher    Right Knee Exam     Tenderness   Right knee tenderness location: diffuse.    Range of Motion   Extension: -5   Flexion: 110     Muscle Strength     The patient has normal right knee strength.    Tests   Drawer:       Anterior - negative    Posterior - negative  Varus: negative  Valgus: negative    Other   Sensation: normal  Pulse: present  Swelling: mild    Comments:  Crepitation on motion.  Mild to moderate pain through arc of motion      Left Knee Exam     Tenderness   Left knee tenderness location: diffuse.    Range of Motion   Extension: -10   Flexion: 100     Muscle Strength     The patient has normal left knee strength.    Tests   Drawer:       Anterior - negative     Posterior - negative  Varus: negative  Valgus: negative    Other   Sensation: normal  Pulse: present  Swelling: mild    Comments:  Crepitation with motion  Mild to moderate pain through arc of motion                  Xr Knee Bilateral Ap Standing    Result Date: 5/9/2018  Narrative: AP bilateral standing of the knees with lateral of each knee shows severe tricompartmental osteoarthritic change involving the right knee with complete flattening of the medial femoral condyle and medial tibial plateau.  She shows varus position with lateral subluxation of the tibia.  There are significant osteophytes noted along the medial and lateral aspects of the knee.  The left knee shows moderate to severe medial joint space narrowing with medial osteophytes present.  Mild varus positioning is noted.  The patellofemoral compartment shows moderate osteoarthritic changes on the right knee and mild osteoarthritic changes in the left knee.  No acute bony abnormality is noted on  either side.  No prior x-rays available for comparison. 05/09/18 at 9:40 AM by Gerry Ochoa MD      Xr Knee 1 Or 2 View Bilateral    Result Date: 5/9/2018  Narrative: AP bilateral standing of the knees with lateral of each knee shows severe tricompartmental osteoarthritic change involving the right knee with complete flattening of the medial femoral condyle and medial tibial plateau.  She shows varus position with lateral subluxation of the tibia.  There are significant osteophytes noted along the medial and lateral aspects of the knee.  The left knee shows moderate to severe medial joint space narrowing with medial osteophytes present.  Mild varus positioning is noted.  The patellofemoral compartment shows moderate osteoarthritic changes on the right knee and mild osteoarthritic changes in the left knee.  No acute bony abnormality is noted on either side.  No prior x-rays available for comparison. 05/09/18 at 9:40 AM by Gerry Ochoa MD      Last hbg A1c was 7.2      ASSESSMENT:    Diagnoses and all orders for this visit:    Pain in both knees, unspecified chronicity    Essential hypertension    Diabetes mellitus, type II, insulin dependent    Morbid obesity with BMI of 40.0-44.9, adult    Heavy cigarette smoker (20-39 per day)    Primary osteoarthritis of both knees          PLAN    Patient's Body mass index is 43.08 kg/m². BMI is above normal parameters. Recommendations include: exercise counseling and nutrition counseling.    Very complicated clinical situation.  Patient with severe OA both knees with limited mobility.  She is a greater than 1 pack per day smoker and refuses to consider smoking despite a long list of good clinical reasons to quit.  I advised the patient of the risks in continuing to use tobacco.  During this visit, I spent about 5 minutes counseling the patient regarding smoking cessation.    We discussed use of PT for increased mobility but the patient refused saying that she  knew what she needed to do and therapy would not help her.    We also discussed water aerobics and exercises but she says that is afraid of water.    We discussed a possible steroid injection but she does not want one today.      So, I gave her some home exercises and encouraged her to be as active as possible.  She is not a surgical candidate in her current state.    She need further encouragement and medical management with her primary physician until she becomes a better surgical candidate.      Return in about 8 weeks (around 7/3/2018) for recheck.    Gerry Ochoa MD

## 2018-05-08 NOTE — TELEPHONE ENCOUNTER
Spoke with pt let her know she can stop the glipizide and we can change her depression medication to effexor 37.5mg bid and this has been sent to pharmacy

## 2018-05-09 RX ORDER — VENLAFAXINE HYDROCHLORIDE 75 MG/1
75 CAPSULE, EXTENDED RELEASE ORAL DAILY
Qty: 30 CAPSULE | Refills: 1 | Status: SHIPPED | OUTPATIENT
Start: 2018-05-09 | End: 2018-07-17 | Stop reason: SDUPTHER

## 2018-06-14 RX ORDER — FLURBIPROFEN SODIUM 0.3 MG/ML
1 SOLUTION/ DROPS OPHTHALMIC DAILY
Qty: 100 EACH | Refills: 1 | Status: SHIPPED | OUTPATIENT
Start: 2018-06-14 | End: 2018-11-13

## 2018-07-10 ENCOUNTER — OFFICE VISIT (OUTPATIENT)
Dept: ORTHOPEDIC SURGERY | Facility: CLINIC | Age: 73
End: 2018-07-10

## 2018-07-10 VITALS — BODY MASS INDEX: 41.15 KG/M2 | HEIGHT: 64 IN | WEIGHT: 241 LBS

## 2018-07-10 DIAGNOSIS — G47.33 OBSTRUCTIVE SLEEP APNEA SYNDROME: ICD-10-CM

## 2018-07-10 DIAGNOSIS — E11.9 DIABETES MELLITUS, TYPE II, INSULIN DEPENDENT (HCC): ICD-10-CM

## 2018-07-10 DIAGNOSIS — E66.01 MORBID OBESITY WITH BMI OF 40.0-44.9, ADULT (HCC): ICD-10-CM

## 2018-07-10 DIAGNOSIS — M25.561 PAIN IN BOTH KNEES, UNSPECIFIED CHRONICITY: ICD-10-CM

## 2018-07-10 DIAGNOSIS — I10 ESSENTIAL HYPERTENSION: ICD-10-CM

## 2018-07-10 DIAGNOSIS — M25.562 PAIN IN BOTH KNEES, UNSPECIFIED CHRONICITY: ICD-10-CM

## 2018-07-10 DIAGNOSIS — Z79.4 DIABETES MELLITUS, TYPE II, INSULIN DEPENDENT (HCC): ICD-10-CM

## 2018-07-10 DIAGNOSIS — M17.0 PRIMARY OSTEOARTHRITIS OF BOTH KNEES: Primary | ICD-10-CM

## 2018-07-10 PROCEDURE — 99213 OFFICE O/P EST LOW 20 MIN: CPT | Performed by: ORTHOPAEDIC SURGERY

## 2018-07-10 NOTE — PROGRESS NOTES
Michelle Pierce is a 72 y.o. female returns for     Chief Complaint   Patient presents with   • Right Knee - Follow-up   • Left Knee - Follow-up       HISTORY OF PRESENT ILLNESS: Patient is here today for a follow up on both of her knees.  She has had steroid and orthovisc injections in the past with no improvement.   She states that she has lost about 10 pounds since her last visit.  She also states that she has decreased her cigarette usage to 10-15 per day.  She uses the motorized wheelchair when she is out of the house but she uses a walker when she is at home.       CONCURRENT MEDICAL HISTORY:    Past Medical History:   Diagnosis Date   • Arthritis of spine (CMS/HCC)    • Chronic obstructive lung disease (CMS/HCC)    • Cirrhosis (CMS/HCC)    • Depression    • Diabetes mellitus (CMS/HCC)    • Hyperlipidemia    • Hypertension    • Neurologic disorder    • Obesity    • Sleep apnea    • Tobacco dependence        Allergies   Allergen Reactions   • Morpholine Salicylate Shortness Of Breath     Morphine allergy, states couldn't breathe   • Morphine And Related      Dyspnea   • Morphine Sulfate Unknown (See Comments)   • Prozac [Fluoxetine Hcl] Anxiety         Current Outpatient Prescriptions:   •  ACCU-CHEK KAYKAY PLUS test strip, TEST TWICE DAILY AS DIRECTED, Disp: 100 each, Rfl: 1  •  albuterol (VENTOLIN HFA) 108 (90 Base) MCG/ACT inhaler, Inhale 2 puffs Every 4 (Four) Hours As Needed for Wheezing., Disp: 1 inhaler, Rfl: 0  •  B Complex Vitamins (VITAMIN B COMPLEX) capsule capsule, Take 1 capsule by mouth daily., Disp: , Rfl:   •  B-D UF III MINI PEN NEEDLES 31G X 5 MM misc, , Disp: , Rfl:   •  B-D UF III MINI PEN NEEDLES 31G X 5 MM misc, INJECT 1 APPLICATOR UNDER THE SKIN DAILY., Disp: 100 each, Rfl: 1  •  betamethasone dipropionate (DIPROLENE) 0.05 % ointment, Apply  topically 2 (Two) Times a Day., Disp: 45 g, Rfl: 2  •  cholecalciferol (VITAMIN D3) 1000 UNITS tablet, Take 1,000 Units by mouth daily., Disp: , Rfl:  "  •  clotrimazole (LOTRIMIN) 1 % cream, Apply  topically 2 (Two) Times a Day., Disp: 45 g, Rfl: 2  •  escitalopram (LEXAPRO) 10 MG tablet, Take 1 tablet by mouth Daily., Disp: 30 tablet, Rfl: 2  •  estradiol (ESTRACE VAGINAL) 0.1 MG/GM vaginal cream, 1/2 applicator 3 times a week, Disp: 42.5 g, Rfl: 1  •  Insulin Glargine (BASAGLAR KWIKPEN) 100 UNIT/ML injection pen, Inject 45 Units under the skin Every Night., Disp: 3 pen, Rfl: 1  •  lisinopril (PRINIVIL,ZESTRIL) 5 MG tablet, Take 0.5 tablets by mouth Daily., Disp: 90 tablet, Rfl: 1  •  lovastatin (MEVACOR) 40 MG tablet, Take 1 tablet by mouth Every Night., Disp: 180 tablet, Rfl: 1  •  metFORMIN (GLUCOPHAGE) 1000 MG tablet, Take 1 tablet by mouth 2 (Two) Times a Day With Meals., Disp: 180 tablet, Rfl: 2  •  traZODone (DESYREL) 50 MG tablet, Take 1 tablet by mouth Every Night., Disp: 90 tablet, Rfl: 1  •  triamcinolone (KENALOG) 0.025 % cream, , Disp: , Rfl:   •  Unable to find, Nystat/HC 1% Zinc 111 Gram, Disp: , Rfl: 2  •  venlafaxine XR (EFFEXOR-XR) 75 MG 24 hr capsule, Take 1 capsule by mouth Daily., Disp: 30 capsule, Rfl: 1    Past Surgical History:   Procedure Laterality Date   • BACK SURGERY  2013    May 1 or 2, 2013   • COLONOSCOPY  01/21/2005   • COLONOSCOPY N/A 6/1/2017    Procedure: COLONOSCOPY;  Surgeon: Mat Jennings MD;  [facility];  Service:    • ESOPHAGOSCOPY / EGD  02/25/1998    Mild esophagitis, mild gastritis, the most likely diagnosis for the esophageal inflammation is reflux esophagitis.   • EYE SURGERY      cataract, right eye   • FINGER SURGERY  04/07/1994    Partial amputation, right ring finger   • HYSTERECTOMY     • TUBAL ABDOMINAL LIGATION         ROS  No fevers or chills.  No chest pain or shortness of air.  No GI or  disturbances.    PHYSICAL EXAMINATION:       Ht 162.6 cm (64\")   Wt 109 kg (241 lb)   BMI 41.37 kg/m²     Physical Exam   Constitutional: She is oriented to person, place, and time. She appears " well-developed and well-nourished.   Neurological: She is alert and oriented to person, place, and time.   Psychiatric: She has a normal mood and affect. Her behavior is normal. Judgment and thought content normal.       GAIT:     []  Normal  []  Antalgic    Assistive device: []  None  []  Walker     []  Crutches  []  Cane     []  Wheelchair  []  Stretcher    Right Knee Exam     Tenderness   Right knee tenderness location: diffuse.    Range of Motion   Extension: -5   Flexion: 100     Muscle Strength     The patient has normal right knee strength.    Tests   Drawer:       Anterior - negative    Posterior - negative  Varus: negative  Valgus: negative    Other   Sensation: normal  Pulse: present  Swelling: mild    Comments:  Crepitation on motion.  Mild to moderate pain through arc of motion      Left Knee Exam     Tenderness   Left knee tenderness location: diffuse.    Range of Motion   Extension: -10   Flexion: 100     Muscle Strength     The patient has normal left knee strength.    Tests   Drawer:       Anterior - negative     Posterior - negative  Varus: negative  Valgus: negative    Other   Sensation: normal  Pulse: present  Swelling: mild    Comments:  Crepitation with motion  Mild to moderate pain through arc of motion                  Patient's Body mass index is 41.37 kg/m². BMI is above normal parameters. Recommendations include: exercise counseling and nutrition counseling.      ASSESSMENT:    Diagnoses and all orders for this visit:    Primary osteoarthritis of both knees    Pain in both knees, unspecified chronicity    Essential hypertension    Diabetes mellitus, type II, insulin dependent (CMS/[facility])    Obstructive sleep apnea syndrome    Morbid obesity with BMI of 40.0-44.9, adult (CMS/[facility])          PLAN    We had a long discussion in that she has made excellent progress.  We discussed continuing with range of motion and strengthening exercises.  Continue with efforts for weight loss.  We discussed the  possibility of total knee arthroplasty if she continues to improve her health status.    However, patient also made it clear that she was 72 years old and she didn't have to change for anybody and she was returned to its she wanted to do and if she still lost weight and that was just fine.  I discussed the possibility of physical therapy and she said that she did not need physical therapy at this time.    Return in about 4 weeks (around 8/7/2018) for recheck.    Gerry Ochoa MD

## 2018-07-16 ENCOUNTER — TELEPHONE (OUTPATIENT)
Dept: INTERNAL MEDICINE | Facility: CLINIC | Age: 73
End: 2018-07-16

## 2018-07-16 NOTE — TELEPHONE ENCOUNTER
Due to rescheduling pt needs a refill on venalafaxine, trazadone, metformin, basaglar sent to [facility].  Can she get more than 1 month?  It's cheaper on her.

## 2018-07-17 RX ORDER — VENLAFAXINE HYDROCHLORIDE 75 MG/1
75 CAPSULE, EXTENDED RELEASE ORAL DAILY
Qty: 90 CAPSULE | Refills: 1 | Status: SHIPPED | OUTPATIENT
Start: 2018-07-17 | End: 2019-01-14 | Stop reason: SDUPTHER

## 2018-07-17 RX ORDER — INSULIN GLARGINE 100 [IU]/ML
45 INJECTION, SOLUTION SUBCUTANEOUS NIGHTLY
Qty: 6 PEN | Refills: 1 | Status: SHIPPED | OUTPATIENT
Start: 2018-07-17 | End: 2018-08-21 | Stop reason: SDUPTHER

## 2018-08-07 ENCOUNTER — OFFICE VISIT (OUTPATIENT)
Dept: ORTHOPEDIC SURGERY | Facility: CLINIC | Age: 73
End: 2018-08-07

## 2018-08-07 VITALS — HEIGHT: 64 IN | BODY MASS INDEX: 41.15 KG/M2 | WEIGHT: 241 LBS

## 2018-08-07 DIAGNOSIS — I10 ESSENTIAL HYPERTENSION: ICD-10-CM

## 2018-08-07 DIAGNOSIS — M17.0 PRIMARY OSTEOARTHRITIS OF BOTH KNEES: ICD-10-CM

## 2018-08-07 DIAGNOSIS — F17.210 HEAVY CIGARETTE SMOKER (20-39 PER DAY): ICD-10-CM

## 2018-08-07 DIAGNOSIS — G47.33 OBSTRUCTIVE SLEEP APNEA SYNDROME: ICD-10-CM

## 2018-08-07 DIAGNOSIS — M25.561 PAIN IN BOTH KNEES, UNSPECIFIED CHRONICITY: Primary | ICD-10-CM

## 2018-08-07 DIAGNOSIS — F17.200 TOBACCO DEPENDENCE: ICD-10-CM

## 2018-08-07 DIAGNOSIS — M25.562 PAIN IN BOTH KNEES, UNSPECIFIED CHRONICITY: Primary | ICD-10-CM

## 2018-08-07 DIAGNOSIS — E66.01 MORBID OBESITY WITH BMI OF 40.0-44.9, ADULT (HCC): ICD-10-CM

## 2018-08-07 DIAGNOSIS — Z79.4 DIABETES MELLITUS, TYPE II, INSULIN DEPENDENT (HCC): ICD-10-CM

## 2018-08-07 DIAGNOSIS — E11.9 DIABETES MELLITUS, TYPE II, INSULIN DEPENDENT (HCC): ICD-10-CM

## 2018-08-07 PROCEDURE — 99213 OFFICE O/P EST LOW 20 MIN: CPT | Performed by: ORTHOPAEDIC SURGERY

## 2018-08-07 NOTE — PROGRESS NOTES
"Michelle Pierce is a 72 y.o. female returns for     Chief Complaint   Patient presents with   • Left Knee - Follow-up   • Right Knee - Follow-up       HISTORY OF PRESENT ILLNESS: Patient being seen for bilateral knee follow up. Patient reports no changes since last visit.    No new complaints.  Still smoking.  No new injury  She is not actively trying to diet and says she doesn't have time to anyway.  Now on a motorized scooter.  She reports no pain unless trying to stand or walk.       CONCURRENT MEDICAL HISTORY:    The following portions of the patient's history were reviewed and updated as appropriate: allergies, current medications, past family history, past medical history, past social history, past surgical history and problem list.     ROS  No fevers or chills.  No chest pain or shortness of air.  No GI or  disturbances.    PHYSICAL EXAMINATION:       Ht 162.6 cm (64\")   Wt 109 kg (241 lb)   BMI 41.37 kg/m²     Physical Exam   Constitutional: She is oriented to person, place, and time. She appears well-developed and well-nourished.   Neurological: She is alert and oriented to person, place, and time.   Psychiatric: She has a normal mood and affect. Her behavior is normal. Judgment and thought content normal.       GAIT:     []  Normal  [x]  Antalgic    Assistive device: []  None  []  Walker     []  Crutches  []  Cane     [x]  Wheelchair  []  Stretcher    Right Knee Exam     Tenderness   Right knee tenderness location: diffuse.    Range of Motion   Extension: -10   Flexion: 100     Muscle Strength     The patient has normal right knee strength.    Tests   Drawer:       Anterior - negative    Posterior - negative  Varus: negative  Valgus: negative    Other   Sensation: normal  Pulse: present  Swelling: mild    Comments:  Crepitation on motion.  Mild to moderate pain through arc of motion      Left Knee Exam     Tenderness   Left knee tenderness location: diffuse.    Range of Motion   Extension: -5   Flexion: " 120     Muscle Strength     The patient has normal left knee strength.    Tests   Drawer:       Anterior - negative     Posterior - negative  Varus: negative  Valgus: negative    Other   Sensation: normal  Pulse: present  Swelling: none    Comments:  Crepitation with motion  Mild to moderate pain through arc of motion              XR Knee 1 or 2 View Bilateral  AP bilateral standing of the knees with lateral of each knee shows severe tricompartmental osteoarthritic change involving the right knee with complete flattening of the medial femoral condyle and medial tibial plateau.  She shows varus position with lateral subluxation of the tibia.  There are significant osteophytes noted along the medial and lateral aspects of the knee.  The left knee shows moderate to severe medial joint space narrowing with medial osteophytes present.  Mild varus positioning is noted.  The patellofemoral compartment shows moderate osteoarthritic changes on the right knee and mild osteoarthritic changes in the left knee.  No acute bony abnormality is noted on either side.  No prior x-rays available for comparison.  05/09/18 at 9:40 AM by Gerry Ochoa MD       XR Knee Bilateral AP Standing  AP bilateral standing of the knees with lateral of each knee shows severe tricompartmental osteoarthritic change involving the right knee with complete flattening of the medial femoral condyle and medial tibial plateau.  She shows varus position with lateral subluxation of the tibia.  There are significant osteophytes noted along the medial and lateral aspects of the knee.  The left knee shows moderate to severe medial joint space narrowing with medial osteophytes present.  Mild varus positioning is noted.  The patellofemoral compartment shows moderate osteoarthritic changes on the right knee and mild osteoarthritic changes in the left knee.  No acute bony abnormality is noted on either side.  No prior x-rays available for  comparison.  05/09/18 at 9:40 AM by Gerry Ochoa MD               ASSESSMENT:    Diagnoses and all orders for this visit:    Pain in both knees, unspecified chronicity    Primary osteoarthritis of both knees    Essential hypertension    Diabetes mellitus, type II, insulin dependent (CMS/[facility])    Tobacco dependence    Obstructive sleep apnea syndrome    Morbid obesity with BMI of 40.0-44.9, adult (CMS/[facility])    Heavy cigarette smoker (20-39 per day)          PLAN    Long discussion held with patient regarding potential for surgery.  She has multiple medical comorbidities and the ones that she can affect she refuses to actively.  We discussed that she was not a surgical candidate at this time.  Recommend followup with Primary physician for medical management.    Patient's Body mass index is 41.37 kg/m². BMI is above normal parameters. Recommendations include: exercise counseling and nutrition counseling.      Return if symptoms worsen or fail to improve, for recheck.    Gerry Ochoa MD

## 2018-08-21 ENCOUNTER — APPOINTMENT (OUTPATIENT)
Dept: LAB | Facility: HOSPITAL | Age: 73
End: 2018-08-21

## 2018-08-21 ENCOUNTER — OFFICE VISIT (OUTPATIENT)
Dept: INTERNAL MEDICINE | Facility: CLINIC | Age: 73
End: 2018-08-21

## 2018-08-21 VITALS
DIASTOLIC BLOOD PRESSURE: 70 MMHG | WEIGHT: 241.4 LBS | SYSTOLIC BLOOD PRESSURE: 120 MMHG | HEIGHT: 64 IN | BODY MASS INDEX: 41.21 KG/M2

## 2018-08-21 DIAGNOSIS — E11.9 DIABETES MELLITUS, TYPE II, INSULIN DEPENDENT (HCC): Primary | ICD-10-CM

## 2018-08-21 DIAGNOSIS — F17.200 TOBACCO DEPENDENCE: ICD-10-CM

## 2018-08-21 DIAGNOSIS — F32.A DEPRESSIVE DISORDER: ICD-10-CM

## 2018-08-21 DIAGNOSIS — Z79.4 DIABETES MELLITUS, TYPE II, INSULIN DEPENDENT (HCC): Primary | ICD-10-CM

## 2018-08-21 DIAGNOSIS — I10 ESSENTIAL HYPERTENSION: ICD-10-CM

## 2018-08-21 DIAGNOSIS — Z23 IMMUNIZATION DUE: ICD-10-CM

## 2018-08-21 LAB
ALBUMIN SERPL-MCNC: 4 G/DL (ref 3.4–4.8)
ALBUMIN/GLOB SERPL: 1.1 G/DL (ref 1.1–1.8)
ALP SERPL-CCNC: 77 U/L (ref 38–126)
ALT SERPL W P-5'-P-CCNC: 23 U/L (ref 9–52)
ANION GAP SERPL CALCULATED.3IONS-SCNC: 12 MMOL/L (ref 5–15)
AST SERPL-CCNC: 31 U/L (ref 14–36)
BASOPHILS # BLD AUTO: 0.03 10*3/MM3 (ref 0–0.2)
BASOPHILS NFR BLD AUTO: 0.3 % (ref 0–2)
BILIRUB SERPL-MCNC: 0.2 MG/DL (ref 0.2–1.3)
BUN BLD-MCNC: 11 MG/DL (ref 7–21)
BUN/CREAT SERPL: 14.5 (ref 7–25)
CALCIUM SPEC-SCNC: 9.5 MG/DL (ref 8.4–10.2)
CHLORIDE SERPL-SCNC: 103 MMOL/L (ref 95–110)
CO2 SERPL-SCNC: 24 MMOL/L (ref 22–31)
CREAT BLD-MCNC: 0.76 MG/DL (ref 0.5–1)
DEPRECATED RDW RBC AUTO: 50.8 FL (ref 36.4–46.3)
EOSINOPHIL # BLD AUTO: 0.41 10*3/MM3 (ref 0–0.7)
EOSINOPHIL NFR BLD AUTO: 3.7 % (ref 0–7)
ERYTHROCYTE [DISTWIDTH] IN BLOOD BY AUTOMATED COUNT: 15.4 % (ref 11.5–14.5)
GFR SERPL CREATININE-BSD FRML MDRD: 75 ML/MIN/1.73 (ref 39–90)
GLOBULIN UR ELPH-MCNC: 3.5 GM/DL (ref 2.3–3.5)
GLUCOSE BLD-MCNC: 118 MG/DL (ref 60–100)
HBA1C MFR BLD: 7.5 % (ref 4–5.6)
HCT VFR BLD AUTO: 42.2 % (ref 35–45)
HGB BLD-MCNC: 14 G/DL (ref 12–15.5)
IMM GRANULOCYTES # BLD: 0.04 10*3/MM3 (ref 0–0.02)
IMM GRANULOCYTES NFR BLD: 0.4 % (ref 0–0.5)
LYMPHOCYTES # BLD AUTO: 3.41 10*3/MM3 (ref 0.6–4.2)
LYMPHOCYTES NFR BLD AUTO: 30.8 % (ref 10–50)
MCH RBC QN AUTO: 29.9 PG (ref 26.5–34)
MCHC RBC AUTO-ENTMCNC: 33.2 G/DL (ref 31.4–36)
MCV RBC AUTO: 90.2 FL (ref 80–98)
MONOCYTES # BLD AUTO: 0.81 10*3/MM3 (ref 0–0.9)
MONOCYTES NFR BLD AUTO: 7.3 % (ref 0–12)
NEUTROPHILS # BLD AUTO: 6.38 10*3/MM3 (ref 2–8.6)
NEUTROPHILS NFR BLD AUTO: 57.5 % (ref 37–80)
PLATELET # BLD AUTO: 355 10*3/MM3 (ref 150–450)
PMV BLD AUTO: 10.2 FL (ref 8–12)
POTASSIUM BLD-SCNC: 4.4 MMOL/L (ref 3.5–5.1)
PROT SERPL-MCNC: 7.5 G/DL (ref 6.3–8.6)
RBC # BLD AUTO: 4.68 10*6/MM3 (ref 3.77–5.16)
SODIUM BLD-SCNC: 139 MMOL/L (ref 137–145)
WBC NRBC COR # BLD: 11.08 10*3/MM3 (ref 3.2–9.8)

## 2018-08-21 PROCEDURE — G0009 ADMIN PNEUMOCOCCAL VACCINE: HCPCS | Performed by: INTERNAL MEDICINE

## 2018-08-21 PROCEDURE — 85025 COMPLETE CBC W/AUTO DIFF WBC: CPT | Performed by: INTERNAL MEDICINE

## 2018-08-21 PROCEDURE — 80053 COMPREHEN METABOLIC PANEL: CPT | Performed by: INTERNAL MEDICINE

## 2018-08-21 PROCEDURE — 83036 HEMOGLOBIN GLYCOSYLATED A1C: CPT | Performed by: INTERNAL MEDICINE

## 2018-08-21 PROCEDURE — 99214 OFFICE O/P EST MOD 30 MIN: CPT | Performed by: INTERNAL MEDICINE

## 2018-08-21 PROCEDURE — 36415 COLL VENOUS BLD VENIPUNCTURE: CPT | Performed by: INTERNAL MEDICINE

## 2018-08-21 PROCEDURE — 90670 PCV13 VACCINE IM: CPT | Performed by: INTERNAL MEDICINE

## 2018-08-21 RX ORDER — TRAZODONE HYDROCHLORIDE 50 MG/1
50 TABLET ORAL NIGHTLY
Qty: 90 TABLET | Refills: 1 | Status: SHIPPED | OUTPATIENT
Start: 2018-08-21 | End: 2019-02-14 | Stop reason: SDUPTHER

## 2018-08-21 RX ORDER — ESCITALOPRAM OXALATE 10 MG/1
10 TABLET ORAL DAILY
Qty: 90 TABLET | Refills: 1 | Status: SHIPPED | OUTPATIENT
Start: 2018-08-21 | End: 2019-02-14 | Stop reason: SDUPTHER

## 2018-08-21 RX ORDER — LISINOPRIL 5 MG/1
2.5 TABLET ORAL DAILY
Qty: 90 TABLET | Refills: 1 | Status: SHIPPED | OUTPATIENT
Start: 2018-08-21 | End: 2019-02-14 | Stop reason: SDUPTHER

## 2018-08-21 RX ORDER — LOVASTATIN 40 MG/1
40 TABLET ORAL NIGHTLY
Qty: 180 TABLET | Refills: 1 | Status: SHIPPED | OUTPATIENT
Start: 2018-08-21 | End: 2019-02-14 | Stop reason: SDUPTHER

## 2018-08-21 RX ORDER — INSULIN GLARGINE 100 [IU]/ML
45 INJECTION, SOLUTION SUBCUTANEOUS NIGHTLY
Qty: 6 PEN | Refills: 1 | Status: SHIPPED | OUTPATIENT
Start: 2018-08-21 | End: 2019-02-14 | Stop reason: SDUPTHER

## 2018-08-21 NOTE — PROGRESS NOTES
Subjective   Michelle Pierce is a 72 y.o. female         Patient is in for recheck on HTN, DM and depression.    She lost about 10lbs since last visit.Glucose is fairly well controlled.  She denies any hyperglycemia or hypoglycemia.    BP is controlled as well. She denies any cardiovascular complaints.  Patient is not very mobile due to arthritis in her knees.    In terms of depression, sge is doing better on Lexapro.  Denies any side effects to the medication.    She continues to smoke pack a day and does not want to quit smoking.  She was seen by ortho  for her knees. She was advised that she needs to lose weight and quit smoking can be contemplated.    Past Medical History:   Diagnosis Date   • Arthritis of spine (CMS/HCC)    • Chronic obstructive lung disease (CMS/HCC)    • Cirrhosis (CMS/HCC)    • Depression    • Diabetes mellitus (CMS/HCC)    • Hyperlipidemia    • Hypertension    • Neurologic disorder    • Obesity    • Sleep apnea    • Tobacco dependence      Past Surgical History:   Procedure Laterality Date   • BACK SURGERY  2013    May 1 or 2, 2013   • COLONOSCOPY  01/21/2005   • COLONOSCOPY N/A 6/1/2017    Procedure: COLONOSCOPY;  Surgeon: Mat Jennings MD;  Location: [facility] ENDOSCOPY;  Service:    • ESOPHAGOSCOPY / EGD  02/25/1998    Mild esophagitis, mild gastritis, the most likely diagnosis for the esophageal inflammation is reflux esophagitis.   • EYE SURGERY      cataract, right eye   • FINGER SURGERY  04/07/1994    Partial amputation, right ring finger   • HYSTERECTOMY     • TUBAL ABDOMINAL LIGATION         Social History   Substance Use Topics   • Smoking status: Current Every Day Smoker     Packs/day: 1.00     Years: 50.00     Types: Cigarettes   • Smokeless tobacco: Never Used   • Alcohol use No     Family History   Problem Relation Age of Onset   • Diabetes Other    • Heart disease Other    • Hypertension Other    • Diabetes Mother    • Hypertension Father    • Heart attack Father     • Kidney failure Father    • No Known Problems Maternal Grandmother    • No Known Problems Maternal Grandfather    • No Known Problems Paternal Grandmother    • No Known Problems Paternal Grandfather      Allergies   Allergen Reactions   • Morpholine Salicylate Shortness Of Breath     Morphine allergy, states couldn't breathe   • Morphine And Related      Dyspnea   • Morphine Sulfate Unknown (See Comments)   • Prozac [Fluoxetine Hcl] Anxiety     Current Outpatient Prescriptions on File Prior to Visit   Medication Sig Dispense Refill   • ACCU-CHEK KAYKAY PLUS test strip TEST TWICE DAILY AS DIRECTED 100 each 1   • albuterol (VENTOLIN HFA) 108 (90 Base) MCG/ACT inhaler Inhale 2 puffs Every 4 (Four) Hours As Needed for Wheezing. 1 inhaler 0   • B Complex Vitamins (VITAMIN B COMPLEX) capsule capsule Take 1 capsule by mouth daily.     • B-D UF III MINI PEN NEEDLES 31G X 5 MM misc      • B-D UF III MINI PEN NEEDLES 31G X 5 MM misc INJECT 1 APPLICATOR UNDER THE SKIN DAILY. 100 each 1   • betamethasone dipropionate (DIPROLENE) 0.05 % ointment Apply  topically 2 (Two) Times a Day. 45 g 2   • cholecalciferol (VITAMIN D3) 1000 UNITS tablet Take 1,000 Units by mouth daily.     • clotrimazole (LOTRIMIN) 1 % cream Apply  topically 2 (Two) Times a Day. 45 g 2   • escitalopram (LEXAPRO) 10 MG tablet Take 1 tablet by mouth Daily. 30 tablet 2   • estradiol (ESTRACE VAGINAL) 0.1 MG/GM vaginal cream 1/2 applicator 3 times a week 42.5 g 1   • Insulin Glargine (BASAGLAR KWIKPEN) 100 UNIT/ML injection pen Inject 45 Units under the skin Every Night. 6 pen 1   • lisinopril (PRINIVIL,ZESTRIL) 5 MG tablet Take 0.5 tablets by mouth Daily. 90 tablet 1   • lovastatin (MEVACOR) 40 MG tablet Take 1 tablet by mouth Every Night. 180 tablet 1   • metFORMIN (GLUCOPHAGE) 1000 MG tablet Take 1 tablet by mouth 2 (Two) Times a Day With Meals. 180 tablet 1   • traZODone (DESYREL) 50 MG tablet Take 1 tablet by mouth Every Night. 90 tablet 1   •  triamcinolone (KENALOG) 0.025 % cream      • Unable to find Nystat/HC 1% Zinc 111 Gram  2   • venlafaxine XR (EFFEXOR-XR) 75 MG 24 hr capsule Take 1 capsule by mouth Daily. 90 capsule 1     No current facility-administered medications on file prior to visit.      Review of Systems   Constitutional: Negative for activity change and fatigue.   HENT: Negative.    Eyes: Negative.    Respiratory: Negative for chest tightness and shortness of breath.    Cardiovascular: Negative for chest pain, palpitations and leg swelling.   Gastrointestinal: Negative for abdominal pain, constipation and diarrhea.   Endocrine: Negative for cold intolerance and heat intolerance.   Genitourinary: Negative for difficulty urinating, flank pain and frequency.   Musculoskeletal: Positive for gait problem.   Skin: Negative for color change and rash.   Neurological: Negative for dizziness, light-headedness and headaches.   Psychiatric/Behavioral: Negative for sleep disturbance. The patient is not nervous/anxious.        Objective   Physical Exam   Constitutional: She is oriented to person, place, and time. She appears well-developed and well-nourished.   HENT:   Head: Normocephalic and atraumatic.   Nose: Nose normal.   Mouth/Throat: Oropharynx is clear and moist.   Eyes: Conjunctivae are normal.   Neck: Neck supple. No JVD present.   Cardiovascular: Normal rate and regular rhythm.    Pulmonary/Chest: Effort normal and breath sounds normal.   Abdominal: Soft. Bowel sounds are normal. She exhibits no mass.   Neurological: She is alert and oriented to person, place, and time.   Skin: Skin is warm and dry. No rash noted.   Psychiatric: She has a normal mood and affect. Her behavior is normal. Judgment and thought content normal.   Nursing note and vitals reviewed.          Patient Active Problem List   Diagnosis   • Diabetes mellitus, type II, insulin dependent (CMS/[facility])   • Essential hypertension   • Depressive disorder   • Tobacco dependence   •  Sleep apnea   • Skin cancer   • Pain in both knees   • Primary osteoarthritis of both knees       No visits with results within 6 Month(s) from this visit.   Latest known visit with results is:   Office Visit on 12/21/2017   Component Date Value Ref Range Status   • Glucose 04/02/2018 104* 60 - 100 mg/dL Final   • BUN 04/02/2018 11  7 - 21 mg/dL Final   • Creatinine 04/02/2018 0.75  0.50 - 1.00 mg/dL Final   • Sodium 04/02/2018 141  137 - 145 mmol/L Final   • Potassium 04/02/2018 4.7  3.5 - 5.1 mmol/L Final   • Chloride 04/02/2018 102  95 - 110 mmol/L Final   • CO2 04/02/2018 25.0  22.0 - 31.0 mmol/L Final   • Calcium 04/02/2018 9.4  8.4 - 10.2 mg/dL Final   • Total Protein 04/02/2018 7.4  6.3 - 8.6 g/dL Final   • Albumin 04/02/2018 4.00  3.40 - 4.80 g/dL Final   • ALT (SGPT) 04/02/2018 27  9 - 52 U/L Final   • AST (SGOT) 04/02/2018 29  14 - 36 U/L Final   • Alkaline Phosphatase 04/02/2018 79  38 - 126 U/L Final   • Total Bilirubin 04/02/2018 0.2  0.2 - 1.3 mg/dL Final   • eGFR Non African Amer 04/02/2018 76  39 - 90 mL/min/1.73 Final   • Globulin 04/02/2018 3.4  2.3 - 3.5 gm/dL Final   • A/G Ratio 04/02/2018 1.2  1.1 - 1.8 g/dL Final   • BUN/Creatinine Ratio 04/02/2018 14.7  7.0 - 25.0 Final   • Anion Gap 04/02/2018 14.0  5.0 - 15.0 mmol/L Final   • Hemoglobin A1C 04/02/2018 7.2* 4 - 5.6 % Final   • Total Cholesterol 04/02/2018 170  0 - 199 mg/dL Final   • Triglycerides 04/02/2018 119  20 - 199 mg/dL Final   • HDL Cholesterol 04/02/2018 48* 60 - 200 mg/dL Final   • LDL Cholesterol  04/02/2018 83  1 - 129 mg/dL Final   • LDL/HDL Ratio 04/02/2018 2.05  0.00 - 3.22 Final             Assessment/Plan   Michelle was seen today for med refill.    Diagnoses and all orders for this visit:    Diabetes mellitus, type II, insulin dependent (CMS/[facility])    Essential hypertension    Depressive disorder    Tobacco dependence    BMI 40.0-44.9, adult (CMS/[facility])             Plan        1. Basaglar.  Metformin.  ADA  diet.  HgbA1c.  Discussed hyper  and hypoglycemia. Symptoms and treatment of hypoglycemia reviewed.  Lovastatin is continued for hyperlipidemia. Will monitor lipids and LFTs.  2.Lisinopril.  DASH.  3.Lexapro and Trazodone.  Medication benefits and side effects discussed in detail.  4.I advised Michelle of the risks of continuing to use tobacco, and I provided her with tobacco cessation educational materials in the After Visit Summary.     During this visit, I spent 3 minutes counseling the patient regarding tobacco cessation.  She refuses to quit smoking.  5.. Calories restricted diets reviewed.  Activity: Approximately 150 minutes of moderate activity daily - as tolerated.  Diet: Heart healthy foods - more fresh fruits and vegetables and whole grains; less red meat; more fish and poultry that is baked or grilled - not fried; less salt not to exceed 2000 mg daily - less processed food; No trans or saturated fats  Weight management: Patient's Body mass index is 36.09 kg/m². BMI is above normal parameters. Recommendations include: educational material, exercise counseling and nutrition counseling.  6. Advise that she needs to lose weight and quit smoking if she is to have ortho surgery in the future.          Follow up in 3 months.                This document has been electronically signed by Valerie Lamb MD on August 21, 2018 2:26 PM

## 2018-08-22 ENCOUNTER — TELEPHONE (OUTPATIENT)
Dept: INTERNAL MEDICINE | Facility: CLINIC | Age: 73
End: 2018-08-22

## 2018-08-22 NOTE — TELEPHONE ENCOUNTER
Spoke with pt let her know lab show glucose was higher than before ,DR KING  Recommends she improve diet and a referral to diabetic educator ,and continue working on weight loss

## 2018-08-27 ENCOUNTER — TELEPHONE (OUTPATIENT)
Dept: INTERNAL MEDICINE | Facility: CLINIC | Age: 73
End: 2018-08-27

## 2018-08-27 DIAGNOSIS — Z79.899 ENCOUNTER FOR LONG-TERM (CURRENT) USE OF MEDICATIONS: Primary | ICD-10-CM

## 2018-08-27 PROCEDURE — 93005 ELECTROCARDIOGRAM TRACING: CPT | Performed by: INTERNAL MEDICINE

## 2018-08-27 NOTE — TELEPHONE ENCOUNTER
SPOKE WITH PT LET HER KNOW DUE TO SEVERAL MEDS SHE IS ON THEY RECOMMEND SHE HAVE A EKG DONE I LET HER KNOW THIS HAS BEEN ORDERED AND SHE WILL GO TO 4TH FLOOR AND WE WILL CONTACT HER WITH THE RESULTS

## 2018-08-29 PROCEDURE — 93005 ELECTROCARDIOGRAM TRACING: CPT | Performed by: INTERNAL MEDICINE

## 2018-08-29 PROCEDURE — 93010 ELECTROCARDIOGRAM REPORT: CPT | Performed by: INTERNAL MEDICINE

## 2018-11-08 ENCOUNTER — OFFICE VISIT (OUTPATIENT)
Dept: SLEEP MEDICINE | Facility: HOSPITAL | Age: 73
End: 2018-11-08

## 2018-11-08 VITALS
HEART RATE: 92 BPM | OXYGEN SATURATION: 91 % | BODY MASS INDEX: 40.94 KG/M2 | DIASTOLIC BLOOD PRESSURE: 60 MMHG | HEIGHT: 64 IN | SYSTOLIC BLOOD PRESSURE: 120 MMHG | WEIGHT: 239.8 LBS

## 2018-11-08 DIAGNOSIS — G47.33 OBSTRUCTIVE SLEEP APNEA, ADULT: Primary | ICD-10-CM

## 2018-11-08 PROCEDURE — 99213 OFFICE O/P EST LOW 20 MIN: CPT | Performed by: INTERNAL MEDICINE

## 2018-11-08 NOTE — PROGRESS NOTES
Sleep Clinic Follow Up    Date: 2018  Primary Care Physician: Valerie Lamb MD    Last office visit: 2017 (I reviewed this note)    CC: Follow up: JENA    Sleep Testin. PSG on 10/11/2012, AHI of 74   2. CPAP titration recommended 17/11 cm H2O   3. Currently on ? cm H2O      Assessment and Plan:    1. Obstructive sleep apnea Established, stable (1)  1. Compliant and improved with PAP therapy  2. Continue PAP as prescribed.   3. Script for PAP supplies  4. Compliance check    Interim History (1-3/4):  Since the last visit:    1) severe JENA -  Michelle Pierce has reports compliance with CPAP. She denies mask and machine issues, dry mouth, headaches, or pressures intolerance. She denies abnormal dreams, sleep paralysis, nasal congestion, URI sx.    PAP Data:  Mask type: Full face mask  DME: [facility]    Bed time: 2200  Sleep latency: 5-60 minutes  Number of times awakens during the night: 0  Wake time: 0800  Estimated total sleep time at night: 6-8 hours  Caffeine intake: 24oz of coffee, 0oz of tea, and 24oz of soda  Alcohol intake: 0 drinks per week  Nap time: after lunch   Sleepiness with Driving: none    [place] - 1    2) Patient denies RLS symptoms.     PMHx, FH, SH reviewed and pertinent changes are: nchanged from last office visit on 2017      REVIEW OF SYSTEMS:   Negative for chest pain, fever, cough, SOA, abdominal pain. Smoking:< 1ppd    I advised Michelle of the risks of continuing to use tobacco, and I provided her with tobacco cessation educational materials in the After Visit Summary.     During this visit, I spent 3 minutes counseling the patient regarding tobacco cessation.      Exam ():  Vitals:    18 1309   BP: 120/60   Pulse: 92   SpO2: 91%     Body mass index is 41.14 kg/m². Patient's Body mass index is 41.14 kg/m². BMI is above normal parameters. Recommendations include: referral to primary care.      Gen:  No acute distress, alert, oriented  Lungs:  CTA with  normal effort   CV:  RRR, no M/R/G  GI:  soft, non-tender  Psych:  Appropriate affect      Past Medical History:   Diagnosis Date   • Arthritis of spine    • Chronic obstructive lung disease (CMS/HCC)    • Cirrhosis (CMS/HCC)    • Depression    • Diabetes mellitus (CMS/HCC)    • Hyperlipidemia    • Hypertension    • Neurologic disorder    • Obesity    • Sleep apnea    • Tobacco dependence        Current Outpatient Prescriptions:   •  albuterol (VENTOLIN HFA) 108 (90 Base) MCG/ACT inhaler, Inhale 2 puffs Every 4 (Four) Hours As Needed for Wheezing., Disp: 1 inhaler, Rfl: 0  •  B Complex Vitamins (VITAMIN B COMPLEX) capsule capsule, Take 1 capsule by mouth daily., Disp: , Rfl:   •  B-D UF III MINI PEN NEEDLES 31G X 5 MM misc, , Disp: , Rfl:   •  B-D UF III MINI PEN NEEDLES 31G X 5 MM misc, INJECT 1 APPLICATOR UNDER THE SKIN DAILY., Disp: 100 each, Rfl: 1  •  betamethasone dipropionate (DIPROLENE) 0.05 % ointment, Apply  topically 2 (Two) Times a Day., Disp: 45 g, Rfl: 2  •  cholecalciferol (VITAMIN D3) 1000 UNITS tablet, Take 1,000 Units by mouth daily., Disp: , Rfl:   •  clotrimazole (LOTRIMIN) 1 % cream, Apply  topically 2 (Two) Times a Day., Disp: 45 g, Rfl: 2  •  escitalopram (LEXAPRO) 10 MG tablet, Take 1 tablet by mouth Daily., Disp: 90 tablet, Rfl: 1  •  estradiol (ESTRACE VAGINAL) 0.1 MG/GM vaginal cream, 1/2 applicator 3 times a week, Disp: 42.5 g, Rfl: 1  •  glucose blood (ACCU-CHEK KAYKAY PLUS) test strip, 1 each by Other route Daily. for testing as directed, Disp: 100 each, Rfl: 1  •  Insulin Glargine (BASAGLAR KWIKPEN) 100 UNIT/ML injection pen, Inject 45 Units under the skin into the appropriate area as directed Every Night., Disp: 6 pen, Rfl: 1  •  lisinopril (PRINIVIL,ZESTRIL) 5 MG tablet, Take 0.5 tablets by mouth Daily., Disp: 90 tablet, Rfl: 1  •  lovastatin (MEVACOR) 40 MG tablet, Take 1 tablet by mouth Every Night., Disp: 180 tablet, Rfl: 1  •  metFORMIN (GLUCOPHAGE) 1000 MG tablet, Take 1 tablet  by mouth 2 (Two) Times a Day With Meals., Disp: 180 tablet, Rfl: 1  •  traZODone (DESYREL) 50 MG tablet, Take 1 tablet by mouth Every Night., Disp: 90 tablet, Rfl: 1  •  triamcinolone (KENALOG) 0.025 % cream, , Disp: , Rfl:   •  Unable to find, Nystat/HC 1% Zinc 111 Gram, Disp: , Rfl: 2  •  venlafaxine XR (EFFEXOR-XR) 75 MG 24 hr capsule, Take 1 capsule by mouth Daily., Disp: 90 capsule, Rfl: 1    Total time 15 min, more than half spent in face to face counseling and coordination of care.    RTC in 12 months     This document has been electronically signed by Zach Ortiz MD on November 8, 2018         CC: Valerie Lamb MD          No ref. provider found

## 2018-11-13 ENCOUNTER — OFFICE VISIT (OUTPATIENT)
Dept: FAMILY MEDICINE CLINIC | Facility: CLINIC | Age: 73
End: 2018-11-13

## 2018-11-13 VITALS
SYSTOLIC BLOOD PRESSURE: 118 MMHG | RESPIRATION RATE: 18 BRPM | DIASTOLIC BLOOD PRESSURE: 70 MMHG | HEART RATE: 96 BPM | WEIGHT: 240.3 LBS | OXYGEN SATURATION: 98 % | HEIGHT: 64 IN | BODY MASS INDEX: 41.03 KG/M2 | TEMPERATURE: 98 F

## 2018-11-13 DIAGNOSIS — Z00.00 ENCOUNTER FOR SCREENING AND PREVENTATIVE CARE: ICD-10-CM

## 2018-11-13 DIAGNOSIS — Z79.4 DIABETES MELLITUS, TYPE II, INSULIN DEPENDENT (HCC): ICD-10-CM

## 2018-11-13 DIAGNOSIS — G47.00 INSOMNIA, UNSPECIFIED TYPE: ICD-10-CM

## 2018-11-13 DIAGNOSIS — F32.A DEPRESSIVE DISORDER: ICD-10-CM

## 2018-11-13 DIAGNOSIS — Z23 NEED FOR INFLUENZA VACCINATION: Primary | ICD-10-CM

## 2018-11-13 DIAGNOSIS — Z76.89 ENCOUNTER TO ESTABLISH CARE: ICD-10-CM

## 2018-11-13 DIAGNOSIS — E11.9 DIABETES MELLITUS, TYPE II, INSULIN DEPENDENT (HCC): ICD-10-CM

## 2018-11-13 DIAGNOSIS — I10 ESSENTIAL HYPERTENSION: ICD-10-CM

## 2018-11-13 PROCEDURE — G0008 ADMIN INFLUENZA VIRUS VAC: HCPCS | Performed by: NURSE PRACTITIONER

## 2018-11-13 PROCEDURE — 90662 IIV NO PRSV INCREASED AG IM: CPT | Performed by: NURSE PRACTITIONER

## 2018-11-13 PROCEDURE — 99214 OFFICE O/P EST MOD 30 MIN: CPT | Performed by: NURSE PRACTITIONER

## 2018-11-13 NOTE — PROGRESS NOTES
Subjective   Michelle Pierce is a 73 y.o. female.     Miss Pierce is a 73-year-old female who presents today to establish care for the management of hypertension, type 2 diabetes, depression and anxiety, insomnia. Patient denies any acute symptoms. Patient is also requesting influenza vaccine today.           The following portions of the patient's history were reviewed and updated as appropriate: allergies, current medications, past family history, past medical history, past social history, past surgical history and problem list.    Review of Systems   Constitutional: Negative for activity change, appetite change, chills, diaphoresis, fatigue, fever, unexpected weight gain and unexpected weight loss.   HENT: Negative for congestion, sore throat, trouble swallowing and voice change.    Eyes: Negative for blurred vision, double vision, photophobia, pain and visual disturbance.   Respiratory: Positive for cough. Negative for chest tightness, shortness of breath and wheezing.         Chronic cough. Smoker.   Cardiovascular: Negative for chest pain, palpitations and leg swelling.   Gastrointestinal: Negative for abdominal distention, abdominal pain, anal bleeding, blood in stool, constipation, diarrhea, nausea, vomiting, GERD and indigestion.   Endocrine: Negative for cold intolerance, heat intolerance, polydipsia, polyphagia and polyuria.   Genitourinary: Negative for dysuria, frequency, hematuria and urgency.   Musculoskeletal: Positive for arthralgias, back pain and gait problem. Negative for myalgias.        Chronic low back pain, chronic bilateral knee pain   Skin: Negative for rash.   Allergic/Immunologic: Negative.    Neurological: Negative for dizziness, syncope, weakness, light-headedness and headache.   Hematological: Negative.    Psychiatric/Behavioral: Positive for depressed mood. Negative for suicidal ideas. The patient is nervous/anxious.         Managed with current medication. No suicidal or homicidal  ideations.       Objective   Physical Exam   Constitutional: She is oriented to person, place, and time. She appears well-developed and well-nourished. No distress.   HENT:   Head: Normocephalic and atraumatic.   Right Ear: External ear normal.   Left Ear: External ear normal.   Nose: Nose normal.   Mouth/Throat: Oropharynx is clear and moist.   Eyes: Conjunctivae and EOM are normal. Pupils are equal, round, and reactive to light.   Neck: Normal range of motion. Neck supple. No tracheal deviation present. No thyromegaly present.   Cardiovascular: Normal rate, regular rhythm, normal heart sounds and intact distal pulses. Exam reveals no gallop and no friction rub.   No murmur heard.  Pulmonary/Chest: Effort normal and breath sounds normal. No stridor. No respiratory distress. She has no wheezes. She has no rales.   Abdominal: Soft. Bowel sounds are normal. She exhibits no distension and no mass. There is no tenderness. There is no rebound and no guarding. No hernia.   Musculoskeletal: She exhibits no edema or tenderness.        Right knee: She exhibits decreased range of motion.        Left knee: She exhibits decreased range of motion.        Lumbar back: She exhibits decreased range of motion and pain.   Lymphadenopathy:     She has no cervical adenopathy.   Neurological: She is alert and oriented to person, place, and time. No cranial nerve deficit. Coordination normal.   Skin: Skin is warm and dry. No rash noted. She is not diaphoretic. No erythema. No pallor.   Psychiatric: She has a normal mood and affect. Her behavior is normal. Judgment and thought content normal.   Nursing note and vitals reviewed.        Assessment/Plan   Michelle was seen today for establish care, injections and med refill.    Diagnoses and all orders for this visit:    Need for influenza vaccination  -     Flu Vaccine High Dose PF 65YR+ ([id number])    Encounter to establish care    Encounter for screening and preventative care  -      Comprehensive Metabolic Panel; Future  -     CBC & Differential; Future  -     TSH; Future  -     Lipid Panel; Future  -     Urinalysis With Culture If Indicated - Urine, Clean Catch; Future  -     Hepatitis C Antibody; Future    Essential hypertension    Diabetes mellitus, type II, insulin dependent (CMS/HCC)  -     Hemoglobin A1c; Future    Depressive disorder    Insomnia, unspecified type    1. Hypertension-controlled. No change in therapy at this time.    2. Type 2 diabetes-hemoglobin A1c. Will call with results.    3. Depression-controlled on current therapy. No change at this time. Denies suicidal or homicidal ideations.    4. Insomnia-controlled. No changes in therapy.    5. Health maintenance-routine labs ordered. Will call with results. Flu vaccine today.    6. Follow-up in 3 months or sooner as needed.            This document has been electronically signed by MAMTA Gifford on November 13, 2018 4:39 PM

## 2018-12-31 ENCOUNTER — TELEPHONE (OUTPATIENT)
Dept: FAMILY MEDICINE CLINIC | Facility: CLINIC | Age: 73
End: 2018-12-31

## 2018-12-31 RX ORDER — FLURBIPROFEN SODIUM 0.3 MG/ML
1 SOLUTION/ DROPS OPHTHALMIC DAILY
Qty: 100 EACH | Refills: 2 | Status: SHIPPED | OUTPATIENT
Start: 2018-12-31 | End: 2019-10-21 | Stop reason: SDUPTHER

## 2019-01-14 RX ORDER — VENLAFAXINE HYDROCHLORIDE 75 MG/1
75 CAPSULE, EXTENDED RELEASE ORAL DAILY
Qty: 90 CAPSULE | Refills: 3 | Status: SHIPPED | OUTPATIENT
Start: 2019-01-14 | End: 2020-01-10

## 2019-02-07 ENCOUNTER — LAB (OUTPATIENT)
Dept: LAB | Facility: HOSPITAL | Age: 74
End: 2019-02-07

## 2019-02-07 DIAGNOSIS — Z00.00 ENCOUNTER FOR SCREENING AND PREVENTATIVE CARE: ICD-10-CM

## 2019-02-07 PROCEDURE — 86803 HEPATITIS C AB TEST: CPT

## 2019-02-07 PROCEDURE — 36415 COLL VENOUS BLD VENIPUNCTURE: CPT

## 2019-02-08 LAB — HCV AB SER DONR QL: NEGATIVE

## 2019-02-14 ENCOUNTER — OFFICE VISIT (OUTPATIENT)
Dept: FAMILY MEDICINE CLINIC | Facility: CLINIC | Age: 74
End: 2019-02-14

## 2019-02-14 VITALS
BODY MASS INDEX: 40.37 KG/M2 | HEART RATE: 91 BPM | HEIGHT: 64 IN | SYSTOLIC BLOOD PRESSURE: 100 MMHG | WEIGHT: 236.5 LBS | DIASTOLIC BLOOD PRESSURE: 60 MMHG | RESPIRATION RATE: 18 BRPM | OXYGEN SATURATION: 98 %

## 2019-02-14 DIAGNOSIS — I10 ESSENTIAL HYPERTENSION: Primary | ICD-10-CM

## 2019-02-14 DIAGNOSIS — G47.00 INSOMNIA, UNSPECIFIED TYPE: ICD-10-CM

## 2019-02-14 DIAGNOSIS — E11.9 DIABETES MELLITUS, TYPE II, INSULIN DEPENDENT (HCC): ICD-10-CM

## 2019-02-14 DIAGNOSIS — F32.A DEPRESSION, UNSPECIFIED DEPRESSION TYPE: ICD-10-CM

## 2019-02-14 DIAGNOSIS — Z79.4 DIABETES MELLITUS, TYPE II, INSULIN DEPENDENT (HCC): ICD-10-CM

## 2019-02-14 PROCEDURE — 99214 OFFICE O/P EST MOD 30 MIN: CPT | Performed by: NURSE PRACTITIONER

## 2019-02-14 RX ORDER — LISINOPRIL 5 MG/1
2.5 TABLET ORAL DAILY
Qty: 90 TABLET | Refills: 1 | Status: SHIPPED | OUTPATIENT
Start: 2019-02-14 | End: 2019-09-25 | Stop reason: SDUPTHER

## 2019-02-14 RX ORDER — INSULIN GLARGINE 100 [IU]/ML
45 INJECTION, SOLUTION SUBCUTANEOUS NIGHTLY
Qty: 6 PEN | Refills: 1 | Status: SHIPPED | OUTPATIENT
Start: 2019-02-14 | End: 2019-05-01 | Stop reason: SDUPTHER

## 2019-02-14 RX ORDER — ESCITALOPRAM OXALATE 10 MG/1
10 TABLET ORAL DAILY
Qty: 90 TABLET | Refills: 1 | Status: SHIPPED | OUTPATIENT
Start: 2019-02-14 | End: 2019-05-14

## 2019-02-14 RX ORDER — TRAZODONE HYDROCHLORIDE 50 MG/1
50 TABLET ORAL NIGHTLY
Qty: 90 TABLET | Refills: 1 | Status: SHIPPED | OUTPATIENT
Start: 2019-02-14 | End: 2020-01-10

## 2019-02-14 RX ORDER — LOVASTATIN 40 MG/1
40 TABLET ORAL NIGHTLY
Qty: 180 TABLET | Refills: 1 | Status: SHIPPED | OUTPATIENT
Start: 2019-02-14 | End: 2019-10-28 | Stop reason: SDUPTHER

## 2019-02-14 NOTE — PROGRESS NOTES
Subjective   Michelle Pierce is a 73 y.o. female.     Ms. Pierce is a 73-year-old female presents today for 3-month follow-up related to hypertension, type 2 diabetes, insomnia, depression.  Patient reports her daily blood sugars range between 80 and 120.  Denies any hypo-or hyperglycemic episodes, weakness, dizziness, fatigue, chest pain, shortness of breath, palpitations.  She reports taking all her medications as prescribed with no side effects.  She has no acute complaints today.  Her most recent labs were ordered however it was approximately 3 months before she went to the lab to get them done and most of her lab orders .         The following portions of the patient's history were reviewed and updated as appropriate: allergies, current medications, past family history, past medical history, past social history, past surgical history and problem list.    Review of Systems   Constitutional: Negative for activity change, appetite change, fatigue, unexpected weight gain and unexpected weight loss.   HENT: Negative for congestion, sore throat, trouble swallowing and voice change.    Eyes: Negative.    Respiratory: Negative for cough, chest tightness, shortness of breath and wheezing.    Cardiovascular: Negative for chest pain, palpitations and leg swelling.   Gastrointestinal: Negative for abdominal pain, constipation, diarrhea, nausea and vomiting.   Endocrine: Negative.    Genitourinary: Negative for dysuria.   Musculoskeletal: Positive for arthralgias and gait problem. Negative for myalgias.        Chronic bilateral knee pain.  Follows up with Dr. Ochoa for management.   Skin: Negative for rash.   Allergic/Immunologic: Negative.    Hematological: Negative.    Psychiatric/Behavioral: Negative.        Objective   Physical Exam   Constitutional: She is oriented to person, place, and time. She appears well-developed and well-nourished. No distress.   HENT:   Head: Normocephalic and atraumatic.   Eyes:  Conjunctivae are normal.   Neck: Normal range of motion.   Cardiovascular: Normal rate, regular rhythm and normal heart sounds.   Rechecked blood pressure, 130/80   Pulmonary/Chest: Effort normal and breath sounds normal. No respiratory distress. She has no wheezes. She has no rales.   Musculoskeletal: Normal range of motion. She exhibits no edema or tenderness.   Neurological: She is alert and oriented to person, place, and time.   Skin: Skin is warm and dry. No rash noted. She is not diaphoretic. No erythema. No pallor.   Psychiatric: She has a normal mood and affect. Her behavior is normal. Judgment and thought content normal.   Nursing note and vitals reviewed.        Assessment/Plan   Michelle was seen today for follow-up.    Diagnoses and all orders for this visit:    Essential hypertension    Diabetes mellitus, type II, insulin dependent (CMS/[facility])  -     Hemoglobin A1c; Future  -     Comprehensive Metabolic Panel; Future  -     CBC & Differential; Future  -     TSH; Future  -     Lipid Panel; Future    Depression, unspecified depression type    Insomnia, unspecified type    Other orders  -     escitalopram (LEXAPRO) 10 MG tablet; Take 1 tablet by mouth Daily.  -     glucose blood (ACCU-CHEK KAYKAY PLUS) test strip; 1 each by Other route Daily. for testing as directed  -     Insulin Glargine (BASAGLAR KWIKPEN) 100 UNIT/ML injection pen; Inject 45 Units under the skin into the appropriate area as directed Every Night.  -     lisinopril (PRINIVIL,ZESTRIL) 5 MG tablet; Take 0.5 tablets by mouth Daily.  -     lovastatin (MEVACOR) 40 MG tablet; Take 1 tablet by mouth Every Night.  -     metFORMIN (GLUCOPHAGE) 1000 MG tablet; Take 1 tablet by mouth 2 (Two) Times a Day With Meals.  -     traZODone (DESYREL) 50 MG tablet; Take 1 tablet by mouth Every Night.    1.  Hypertension-controlled.  Refill lisinopril 5 mg half tablet p.o. daily.    2.  Type 2 diabetes-controlled.  Hemoglobin A1c.  Will call with results.  Refill  metformin 1000 mg 1 tablet twice a day.  Refill Basiglar 45 units subcutaneous nightly.  Refill glucometer test strips.    3.  Depression-controlled.  Refill Lexapro 10 mg 1 tablet p.o. daily.  Denies suicidal or homicidal ideations.    4.  Insomnia-controlled.  Refill trazodone 50 mg 1 tablet p.o. nightly.    5.  Health maintenance-routine labs ordered.  Will call with results.    6.  Follow-up in 3 months or sooner if needed.            This document has been electronically signed by MAMTA Gifford on February 14, 2019 1:56 PM

## 2019-04-12 DIAGNOSIS — G47.33 OBSTRUCTIVE SLEEP APNEA, ADULT: Primary | ICD-10-CM

## 2019-05-01 RX ORDER — INSULIN GLARGINE 100 [IU]/ML
45 INJECTION, SOLUTION SUBCUTANEOUS NIGHTLY
Qty: 15 ML | Refills: 1 | Status: SHIPPED | OUTPATIENT
Start: 2019-05-01 | End: 2019-07-09 | Stop reason: SDUPTHER

## 2019-05-07 ENCOUNTER — LAB (OUTPATIENT)
Dept: LAB | Facility: HOSPITAL | Age: 74
End: 2019-05-07

## 2019-05-07 DIAGNOSIS — E11.9 DIABETES MELLITUS, TYPE II, INSULIN DEPENDENT (HCC): ICD-10-CM

## 2019-05-07 DIAGNOSIS — Z79.4 DIABETES MELLITUS, TYPE II, INSULIN DEPENDENT (HCC): ICD-10-CM

## 2019-05-07 LAB
ALBUMIN SERPL-MCNC: 4 G/DL (ref 3.5–5.2)
ALBUMIN/GLOB SERPL: 1.3 G/DL
ALP SERPL-CCNC: 76 U/L (ref 39–117)
ALT SERPL W P-5'-P-CCNC: 12 U/L (ref 1–33)
ANION GAP SERPL CALCULATED.3IONS-SCNC: 9.6 MMOL/L
AST SERPL-CCNC: 12 U/L (ref 1–32)
BASOPHILS # BLD AUTO: 0.04 10*3/MM3 (ref 0–0.2)
BASOPHILS NFR BLD AUTO: 0.5 % (ref 0–1.5)
BILIRUB SERPL-MCNC: 0.2 MG/DL (ref 0.2–1.2)
BUN BLD-MCNC: 10 MG/DL (ref 8–23)
BUN/CREAT SERPL: 14.7 (ref 7–25)
CALCIUM SPEC-SCNC: 9.7 MG/DL (ref 8.6–10.5)
CHLORIDE SERPL-SCNC: 104 MMOL/L (ref 98–107)
CHOLEST SERPL-MCNC: 168 MG/DL (ref 0–200)
CO2 SERPL-SCNC: 27.4 MMOL/L (ref 22–29)
CREAT BLD-MCNC: 0.68 MG/DL (ref 0.57–1)
DEPRECATED RDW RBC AUTO: 47.2 FL (ref 37–54)
EOSINOPHIL # BLD AUTO: 0.35 10*3/MM3 (ref 0–0.4)
EOSINOPHIL NFR BLD AUTO: 4.1 % (ref 0.3–6.2)
ERYTHROCYTE [DISTWIDTH] IN BLOOD BY AUTOMATED COUNT: 14.1 % (ref 12.3–15.4)
GFR SERPL CREATININE-BSD FRML MDRD: 85 ML/MIN/1.73
GLOBULIN UR ELPH-MCNC: 3.2 GM/DL
GLUCOSE BLD-MCNC: 82 MG/DL (ref 65–99)
HBA1C MFR BLD: 7.3 % (ref 4.8–5.6)
HCT VFR BLD AUTO: 38.9 % (ref 34–46.6)
HDLC SERPL-MCNC: 48 MG/DL (ref 40–60)
HGB BLD-MCNC: 12.5 G/DL (ref 12–15.9)
IMM GRANULOCYTES # BLD AUTO: 0.02 10*3/MM3 (ref 0–0.05)
IMM GRANULOCYTES NFR BLD AUTO: 0.2 % (ref 0–0.5)
LDLC SERPL CALC-MCNC: 97 MG/DL (ref 0–100)
LDLC/HDLC SERPL: 2.01 {RATIO}
LYMPHOCYTES # BLD AUTO: 2.84 10*3/MM3 (ref 0.7–3.1)
LYMPHOCYTES NFR BLD AUTO: 33.3 % (ref 19.6–45.3)
MCH RBC QN AUTO: 29.4 PG (ref 26.6–33)
MCHC RBC AUTO-ENTMCNC: 32.1 G/DL (ref 31.5–35.7)
MCV RBC AUTO: 91.5 FL (ref 79–97)
MONOCYTES # BLD AUTO: 0.55 10*3/MM3 (ref 0.1–0.9)
MONOCYTES NFR BLD AUTO: 6.4 % (ref 5–12)
NEUTROPHILS # BLD AUTO: 4.73 10*3/MM3 (ref 1.7–7)
NEUTROPHILS NFR BLD AUTO: 55.5 % (ref 42.7–76)
NRBC BLD AUTO-RTO: 0 /100 WBC (ref 0–0.2)
PLATELET # BLD AUTO: 280 10*3/MM3 (ref 140–450)
PMV BLD AUTO: 10.9 FL (ref 6–12)
POTASSIUM BLD-SCNC: 4.4 MMOL/L (ref 3.5–5.2)
PROT SERPL-MCNC: 7.2 G/DL (ref 6–8.5)
RBC # BLD AUTO: 4.25 10*6/MM3 (ref 3.77–5.28)
SODIUM BLD-SCNC: 141 MMOL/L (ref 136–145)
TRIGL SERPL-MCNC: 117 MG/DL (ref 0–150)
TSH SERPL DL<=0.05 MIU/L-ACNC: 3 MIU/ML (ref 0.27–4.2)
VLDLC SERPL-MCNC: 23.4 MG/DL (ref 5–40)
WBC NRBC COR # BLD: 8.53 10*3/MM3 (ref 3.4–10.8)

## 2019-05-07 PROCEDURE — 83036 HEMOGLOBIN GLYCOSYLATED A1C: CPT

## 2019-05-07 PROCEDURE — 36415 COLL VENOUS BLD VENIPUNCTURE: CPT

## 2019-05-07 PROCEDURE — 80053 COMPREHEN METABOLIC PANEL: CPT

## 2019-05-07 PROCEDURE — 85025 COMPLETE CBC W/AUTO DIFF WBC: CPT

## 2019-05-07 PROCEDURE — 80061 LIPID PANEL: CPT

## 2019-05-07 PROCEDURE — 84443 ASSAY THYROID STIM HORMONE: CPT

## 2019-05-08 NOTE — PROGRESS NOTES
Hemoglobin A1c improving, 7.3.  Reinforce low carb/diabetic diet.  Goal A1c is less than 7.  Follow-up as scheduled.

## 2019-05-14 ENCOUNTER — OFFICE VISIT (OUTPATIENT)
Dept: FAMILY MEDICINE CLINIC | Facility: CLINIC | Age: 74
End: 2019-05-14

## 2019-05-14 ENCOUNTER — LAB (OUTPATIENT)
Dept: LAB | Facility: HOSPITAL | Age: 74
End: 2019-05-14

## 2019-05-14 VITALS
DIASTOLIC BLOOD PRESSURE: 68 MMHG | BODY MASS INDEX: 40.2 KG/M2 | HEIGHT: 64 IN | RESPIRATION RATE: 18 BRPM | OXYGEN SATURATION: 99 % | WEIGHT: 235.5 LBS | HEART RATE: 92 BPM | SYSTOLIC BLOOD PRESSURE: 130 MMHG

## 2019-05-14 DIAGNOSIS — E13.628: ICD-10-CM

## 2019-05-14 DIAGNOSIS — R14.1 GAS PAIN: ICD-10-CM

## 2019-05-14 DIAGNOSIS — E78.5 HYPERLIPIDEMIA, UNSPECIFIED HYPERLIPIDEMIA TYPE: ICD-10-CM

## 2019-05-14 DIAGNOSIS — L60.9 IRREGULAR FINGER NAILS: ICD-10-CM

## 2019-05-14 DIAGNOSIS — Z79.4 DIABETES MELLITUS, TYPE II, INSULIN DEPENDENT (HCC): ICD-10-CM

## 2019-05-14 DIAGNOSIS — E08.628 DIABETES MELLITUS DUE TO UNDERLYING CONDITION WITH OTHER SKIN COMPLICATIONS (HCC): ICD-10-CM

## 2019-05-14 DIAGNOSIS — E11.9 DIABETES MELLITUS, TYPE II, INSULIN DEPENDENT (HCC): ICD-10-CM

## 2019-05-14 DIAGNOSIS — I10 ESSENTIAL HYPERTENSION: Primary | ICD-10-CM

## 2019-05-14 LAB
AMYLASE SERPL-CCNC: 43 U/L (ref 28–100)
FERRITIN SERPL-MCNC: 39 NG/ML (ref 13–150)
IRON 24H UR-MRATE: 51 MCG/DL (ref 37–145)
IRON SATN MFR SERPL: 13 % (ref 20–50)
LIPASE SERPL-CCNC: 33 U/L (ref 13–60)
TIBC SERPL-MCNC: 390 MCG/DL (ref 298–536)
TRANSFERRIN SERPL-MCNC: 262 MG/DL (ref 200–360)
VIT B12 BLD-MCNC: >2000 PG/ML (ref 211–946)

## 2019-05-14 PROCEDURE — 82728 ASSAY OF FERRITIN: CPT

## 2019-05-14 PROCEDURE — 82607 VITAMIN B-12: CPT

## 2019-05-14 PROCEDURE — 83690 ASSAY OF LIPASE: CPT

## 2019-05-14 PROCEDURE — 99214 OFFICE O/P EST MOD 30 MIN: CPT | Performed by: NURSE PRACTITIONER

## 2019-05-14 PROCEDURE — 36415 COLL VENOUS BLD VENIPUNCTURE: CPT

## 2019-05-14 PROCEDURE — 84466 ASSAY OF TRANSFERRIN: CPT

## 2019-05-14 PROCEDURE — 82150 ASSAY OF AMYLASE: CPT

## 2019-05-14 PROCEDURE — 83540 ASSAY OF IRON: CPT

## 2019-05-14 NOTE — PROGRESS NOTES
"Subjective   Michelle Irene Pierce is a 73 y.o. female.     Ms. Pierce is a 73-year-old female who presents today for follow-up related to hypertension, type 2 diabetes, hyperlipidemia and with reports of irregular shaped fingernails and excessive abdominal gas.  Her blood pressure today is 130/68.  She denies chest pain, shortness of breath or palpitations.  Her last hemoglobin A1c was 7.3.  She does admit to \"having a sweet tooth at times\".  She denies any hypoglycemic episodes.  She also reports irregular shaped fingernails that are curved downward.  She states to her knowledge she has never had a vitamin deficiency and has not been anemic.  She does have COPD which appears to be stable at the moment.  She is also concerned that she feels she has excessive gas that she cannot control at times.  She denies diarrhea but states she does have recurrent constipation.  She denies bloody or mucousy stools.  She denies abdominal pain.         The following portions of the patient's history were reviewed and updated as appropriate: allergies, current medications, past family history, past medical history, past social history, past surgical history and problem list.    Review of Systems   Constitutional: Negative for activity change, appetite change, fatigue, unexpected weight gain and unexpected weight loss.   HENT: Negative for congestion, sore throat, trouble swallowing and voice change.    Eyes: Negative.    Respiratory: Negative for cough, chest tightness, shortness of breath and wheezing.    Cardiovascular: Negative for chest pain, palpitations and leg swelling.   Gastrointestinal: Positive for constipation. Negative for abdominal pain, diarrhea, nausea and vomiting.        Reports excessive gas with no identifiable factor     Endocrine: Negative.    Genitourinary: Negative for dysuria.   Musculoskeletal: Negative for arthralgias and myalgias.   Skin: Negative for rash.        Down curved nails     Allergic/Immunologic: " Negative.    Neurological: Negative.    Hematological: Negative.    Psychiatric/Behavioral: Negative.        Objective   Physical Exam   Constitutional: She is oriented to person, place, and time. She appears well-developed and well-nourished. No distress.   HENT:   Head: Normocephalic and atraumatic.   Eyes: Conjunctivae are normal.   Neck: Normal range of motion.   Cardiovascular: Normal rate, regular rhythm and normal heart sounds.   Pulmonary/Chest: Effort normal and breath sounds normal. No respiratory distress. She has no wheezes. She has no rales.   Abdominal: Soft. Normal appearance. She exhibits no distension and no mass. Bowel sounds are decreased. There is no tenderness. There is no rebound and no guarding. No hernia.   Musculoskeletal: Normal range of motion. She exhibits no edema or tenderness.   Neurological: She is alert and oriented to person, place, and time.   Skin: Skin is warm and dry. No rash noted. She is not diaphoretic. No erythema. No pallor.        Psychiatric: She has a normal mood and affect. Her behavior is normal. Judgment and thought content normal.   Nursing note and vitals reviewed.        Assessment/Plan   Michelle was seen today for follow-up.    Diagnoses and all orders for this visit:    Essential hypertension    Diabetes mellitus, type II, insulin dependent (CMS/HCC)    Hyperlipidemia, unspecified hyperlipidemia type    Irregular finger nails  -     Vitamin B12; Future  -     Iron Profile; Future  -     Ferritin; Future    Gas pain  -     Amylase; Future  -     Lipase; Future    Other specified diabetes mellitus with other skin complications (CMS/HCC)   -     Iron Profile; Future    Diabetes mellitus due to underlying condition with other skin complications (CMS/HCC)   -     Ferritin; Future    1.  Hypertension-controlled.  No change therapy at this time.      2.  Type 2 diabetes- increase Basiglar to 45 units subcutaneously every night.  Patient had previously been taking only 42  units every night.  Reinforced low-carb diet.    3.  Hyperlipidemia- controlled.  No change in therapy at this time.    4.  Abdominal gas pain-amylase and lipase.  Will call with results.  Instructed patient to increase water intake and resume fiber supplements.  We will continue to monitor.    5.  Irregular fingernails-vitamin B12, iron profile, ferritin.  Will call with results.    6.  Follow-up in 3 months or sooner if needed.            This document has been electronically signed by MAMTA Gifford on May 14, 2019 5:05 PM

## 2019-05-16 RX ORDER — FERROUS SULFATE 325(65) MG
325 TABLET ORAL 2 TIMES DAILY
Qty: 60 TABLET | Refills: 5 | Status: SHIPPED | OUTPATIENT
Start: 2019-05-16 | End: 2020-01-01 | Stop reason: HOSPADM

## 2019-05-16 NOTE — PROGRESS NOTES
Iron saturation slightly low.  I sent in a prescription for ferrous sulfate 324 mg p.o. twice daily.  May take with food if it causes GI distress.  Follow-up as scheduled.

## 2019-06-06 ENCOUNTER — NURSE TRIAGE (OUTPATIENT)
Dept: CALL CENTER | Facility: HOSPITAL | Age: 74
End: 2019-06-06

## 2019-06-06 ENCOUNTER — TELEPHONE (OUTPATIENT)
Dept: FAMILY MEDICINE CLINIC | Facility: CLINIC | Age: 74
End: 2019-06-06

## 2019-06-06 RX ORDER — LEVOFLOXACIN 500 MG/1
500 TABLET, FILM COATED ORAL DAILY
Qty: 7 TABLET | Refills: 0 | Status: SHIPPED | OUTPATIENT
Start: 2019-06-06 | End: 2020-01-01

## 2019-06-06 NOTE — TELEPHONE ENCOUNTER
Sancho,    Patient states she urinates upon standing ranging from a dribble to a stream accompanied with abdominal pain.  She states she had the same symptoms 6 years ago. She wants to know if you will send her something to pharmacy.     Please advise,  Thanks so much.

## 2019-06-06 NOTE — TELEPHONE ENCOUNTER
Caller c/o pain with urination and frequent urination. Incontience when she stands. Thinks she has A UTI. Advice per guideline.    Reason for Disposition  • Diabetes mellitus or weak immune system (e.g., HIV positive, cancer chemotherapy, transplant patient)    Additional Information  • Negative: Shock suspected (e.g., cold/pale/clammy skin, too weak to stand, low BP, rapid pulse)  • Negative: Sounds like a life-threatening emergency to the triager  • Negative: Followed a genital area injury  • Negative: Followed a genital area injury (penis, scrotum)  • Negative: Vaginal discharge  • Negative: Pus (white, yellow) or bloody discharge from end of penis  • Negative: [1] Taking antibiotic for urinary tract infection (UTI) AND [2] female  • Negative: [1] Taking antibiotic for urinary tract infection (UTI) AND [2] male  • Negative: [1] Discomfort (pain, burning or stinging) when passing urine AND [2] pregnant  • Negative: [1] Discomfort (pain, burning or stinging) when passing urine AND [2] postpartum < 1 month  • [1] Discomfort (pain, burning or stinging) when passing urine AND [2] female  • Negative: Shock suspected (e.g., cold/pale/clammy skin, too weak to stand, low BP, rapid pulse)  • Negative: Sounds like a life-threatening emergency to the triager  • Negative: Followed a genital area injury  • Negative: Taking antibiotic for urinary tract infection (UTI)  • Negative: Pregnant  • Negative: Postpartum < 1 month  • Negative: [1] Unable to urinate (or only a few drops) > 4 hours AND     [2] bladder feels very full (e.g., palpable bladder or strong urge to urinate)  • Negative: Patient sounds very sick or weak to the triager  • Negative: [1] SEVERE pain with urination  (e.g., excruciating) AND [2] not improved after 2 hours of pain medicine and Sitz bath  • Negative: Fever > 100.5 F (38.1 C)  • Negative: Side (flank) or lower back pain present  • Negative: Bedridden (e.g., nursing home patient, CVA, chronic illness,  "recovering from surgery)  • Negative: Artificial heart valve or artificial joint  • Negative: Unusual vaginal discharge (e.g., bad smelling, yellow, green, or foamy-white)  • Negative: Patient is worried about sexually transmitted disease (STD)  • Negative: Possibility of pregnancy    Answer Assessment - Initial Assessment Questions  1. SYMPTOM: \"What's the main symptom you're concerned about?\" (e.g., frequency, incontinence)      *No Answer*  2. ONSET: \"When did the  ________  start?\"      *No Answer*  3. PAIN: \"Is there any pain?\" If so, ask: \"How bad is it?\" (Scale: 1-10; mild, moderate, severe)      *No Answer*  4. CAUSE: \"What do you think is causing the symptoms?\"      *No Answer*  5. OTHER SYMPTOMS: \"Do you have any other symptoms?\" (e.g., fever, flank pain, blood in urine, pain with urination)      *No Answer*  6. PREGNANCY: \"Is there any chance you are pregnant?\" \"When was your last menstrual period?\"      *No Answer*    Answer Assessment - Initial Assessment Questions  1. SEVERITY: \"How bad is the pain?\"  (e.g., Scale 1-10; mild, moderate, or severe)    - MILD (1-3): complains slightly about urination hurting    - MODERATE (4-7): interferes with normal activities      - SEVERE (8-10): excruciating, unwilling or unable to urinate because of the pain       *No Answer*  2. FREQUENCY: \"How many times have you had painful urination today?\"       Frequent urination with incontience  3. PATTERN: \"Is pain present every time you urinate or just sometimes?\"       Every time  4. ONSET: \"When did the painful urination start?\"       today  5. FEVER: \"Do you have a fever?\" If so, ask: \"What is your temperature, how was it measured, and when did it start?\"      No  6. PAST UTI: \"Have you had a urine infection before?\" If so, ask: \"When was the last time?\" and \"What happened that time?\"       *No Answer*  7. CAUSE: \"What do you think is causing the painful urination?\"  (e.g., UTI, scratch, Herpes sore)      UTI  8. OTHER " "SYMPTOMS: \"Do you have any other symptoms?\" (e.g., flank pain, vaginal discharge, genital sores, urgency, blood in urine)      Blood in urine  9. PREGNANCY: \"Is there any chance you are pregnant?\" \"When was your last menstrual period?\"      na    Protocols used: URINATION PAIN - FEMALE-ADULT-, URINARY SYMPTOMS-ADULT-AH      "

## 2019-06-06 NOTE — TELEPHONE ENCOUNTER
Patient was called to inform her of antibiotic sent to pharmacy. Increase water intake. Instructed patient to stop iron while taking antibiotic.  Follow up on Monday if symptoms do not improve.  If symptoms worsen before Monday; follow up at . Patient verbalized understanding.

## 2019-06-06 NOTE — TELEPHONE ENCOUNTER
Levaquin 500 mg daily x7 days sent to pharmacy.  Increase water intake.  Do not take iron and antibiotic at the same time.  Follow-up on Monday if there is no improvement symptoms.  Follow-up at urgent care over the weekend if symptoms worsen.

## 2019-06-06 NOTE — TELEPHONE ENCOUNTER
She wants to talk to nurse or Sancho- said she has a bladder infection and in lot of pain-wants to know if Sancho will call in something to [facility].I offered to make her an appt today but she said she cannot stay out of the bathroom. She can be reached at [phone]-would like call back asa.

## 2019-06-13 ENCOUNTER — OFFICE VISIT (OUTPATIENT)
Dept: FAMILY MEDICINE CLINIC | Facility: CLINIC | Age: 74
End: 2019-06-13

## 2019-06-13 VITALS
RESPIRATION RATE: 18 BRPM | DIASTOLIC BLOOD PRESSURE: 74 MMHG | BODY MASS INDEX: 39.81 KG/M2 | WEIGHT: 233.2 LBS | HEART RATE: 116 BPM | SYSTOLIC BLOOD PRESSURE: 98 MMHG | OXYGEN SATURATION: 96 % | HEIGHT: 64 IN

## 2019-06-13 DIAGNOSIS — R30.0 DYSURIA: Primary | ICD-10-CM

## 2019-06-13 DIAGNOSIS — R39.15 URINARY URGENCY: ICD-10-CM

## 2019-06-13 LAB
BILIRUB BLD-MCNC: NEGATIVE MG/DL
CLARITY, POC: ABNORMAL
COLOR UR: ABNORMAL
GLUCOSE UR STRIP-MCNC: NEGATIVE MG/DL
KETONES UR QL: NEGATIVE
LEUKOCYTE EST, POC: NEGATIVE
NITRITE UR-MCNC: NEGATIVE MG/ML
PH UR: 5 [PH] (ref 5–8)
PROT UR STRIP-MCNC: ABNORMAL MG/DL
RBC # UR STRIP: NEGATIVE /UL
SP GR UR: 1.02 (ref 1–1.03)
UROBILINOGEN UR QL: NORMAL

## 2019-06-13 PROCEDURE — 81003 URINALYSIS AUTO W/O SCOPE: CPT | Performed by: NURSE PRACTITIONER

## 2019-06-13 PROCEDURE — 99213 OFFICE O/P EST LOW 20 MIN: CPT | Performed by: NURSE PRACTITIONER

## 2019-06-13 RX ORDER — OXYBUTYNIN CHLORIDE 5 MG/1
5 TABLET ORAL 2 TIMES DAILY
Qty: 60 TABLET | Refills: 1 | Status: SHIPPED | OUTPATIENT
Start: 2019-06-13 | End: 2019-08-12 | Stop reason: SDUPTHER

## 2019-06-13 NOTE — PROGRESS NOTES
"Subjective   Michelle Irene Pierec is a 73 y.o. female.     Ms. Pierce is a 73-year-old female who presents today for dysuria symptoms.  She was recently treated for urinary tract infection.  She is completed her antibiotic and the burning and stinging has stopped.  She continues to have urgency, frequency and occasional incontinence.  She denies fever, chills, nausea, vomiting, diarrhea.  She states she has had the symptoms \"for a long time\" but they have been progressively worsening.  She is never been diagnosed with overactive bladder nor has she had any medication to treat it symptoms.         The following portions of the patient's history were reviewed and updated as appropriate: allergies, current medications, past family history, past medical history, past social history, past surgical history and problem list.    Review of Systems   Constitutional: Negative for activity change, appetite change, fatigue, unexpected weight gain and unexpected weight loss.   HENT: Negative for congestion, sore throat, trouble swallowing and voice change.    Eyes: Negative.    Respiratory: Negative for cough, chest tightness, shortness of breath and wheezing.    Cardiovascular: Negative for chest pain, palpitations and leg swelling.   Gastrointestinal: Positive for abdominal pain. Negative for constipation, diarrhea, nausea and vomiting.   Endocrine: Negative.    Genitourinary: Positive for difficulty urinating, dysuria, frequency and urgency. Negative for flank pain and hematuria.   Musculoskeletal: Negative for arthralgias and myalgias.   Skin: Negative for rash.   Allergic/Immunologic: Negative.    Neurological: Negative.    Hematological: Negative.    Psychiatric/Behavioral: Negative.        Objective   Physical Exam   Constitutional: She is oriented to person, place, and time. She appears well-developed and well-nourished. No distress.   HENT:   Head: Normocephalic and atraumatic.   Eyes: Conjunctivae are normal.   Neck: Normal " range of motion.   Cardiovascular: Normal rate, regular rhythm and normal heart sounds.   Pulmonary/Chest: Effort normal and breath sounds normal. No respiratory distress. She has no wheezes. She has no rales.   Abdominal: There is tenderness (pressure) in the suprapubic area.   Musculoskeletal: Normal range of motion. She exhibits no edema or tenderness.   Neurological: She is alert and oriented to person, place, and time.   Skin: Skin is warm and dry. No rash noted. She is not diaphoretic. No erythema. No pallor.   Psychiatric: She has a normal mood and affect. Her behavior is normal. Judgment and thought content normal.   Nursing note and vitals reviewed.        Assessment/Plan   Diagnoses and all orders for this visit:    Dysuria  -     POC Urinalysis Dipstick, Automated    Urinary urgency    Other orders  -     oxybutynin (DITROPAN) 5 MG tablet; Take 1 tablet by mouth 2 (Two) Times a Day for 30 days.    1.  Dysuria- UA unremarkable for signs of infection.  Will withhold further antibiotics at this time.    2.  Urinary urgency- patient has agreed to a trial of oxybutynin 5 mg 1 tablet twice a day for 30 days.  She does not wish to be referred to urology at this time.    3.  Follow-up in 1 month for reevaluation or sooner if needed.            This document has been electronically signed by MAMTA Gifford on June 13, 2019 4:40 PM

## 2019-07-01 ENCOUNTER — OFFICE VISIT (OUTPATIENT)
Dept: SLEEP MEDICINE | Facility: HOSPITAL | Age: 74
End: 2019-07-01

## 2019-07-01 VITALS
HEART RATE: 94 BPM | DIASTOLIC BLOOD PRESSURE: 81 MMHG | SYSTOLIC BLOOD PRESSURE: 140 MMHG | BODY MASS INDEX: 39.44 KG/M2 | HEIGHT: 64 IN | WEIGHT: 231 LBS | OXYGEN SATURATION: 95 %

## 2019-07-01 DIAGNOSIS — G47.33 OBSTRUCTIVE SLEEP APNEA, ADULT: Primary | ICD-10-CM

## 2019-07-01 PROCEDURE — 99214 OFFICE O/P EST MOD 30 MIN: CPT | Performed by: NURSE PRACTITIONER

## 2019-07-01 PROCEDURE — 99406 BEHAV CHNG SMOKING 3-10 MIN: CPT | Performed by: NURSE PRACTITIONER

## 2019-07-01 NOTE — PROGRESS NOTES
Sleep Clinic Follow Up    Date: 2019  Primary Care Physician: Avelino Fitzgerald APRN    Last office visit: 2018 (I reviewed this note)    CC: Follow up: JENA on BiPAP      Interim History:  Since the last visit:    1) severe JENA -  Michelle Pierce has remained compliant with CPAP. She denies mask and machine issues, dry mouth, headaches, or pressures intolerance. She denies abnormal dreams, sleep paralysis, nasal congestion, URI sx.    Sleep Testin. PSG on 10/11/2012, AHI of 74   2. CPAP titration recommended 17/11 cm H2O   3. Currently on 17/11 cm H2O    PAP Data:    Time frame: 2019-2019   Compliance 100 %  Average use on days used: 6 hrs 52 min  Percent of days with usage greater than or equal to 4 hours: 98.5%  PAP range : 17/11 cm H2O  Average 90% pressure: 17/11 cmH2O  Leak: 12 seconds   Average AHI 1.3 events/hr  Mask type: Full face mask  DME: [facility]    Bed time: 5122-4608  Sleep latency: varies, 5-90 minutes  Number of times awakens during the night: ~0-2  Wake time: 1575-4349  Estimated total sleep time at night: 4-8 hours  Caffeine intake: 3 cups of coffee, 0 cups of tea, and 3 sodas per day  Alcohol intake: 0 drinks per week  Nap time: none   Sleepiness with Driving: none       2) Patient denies RLS symptoms. Occasional leg cramps.    PMHx, FH, SH reviewed and pertinent changes are:  unchanged from last office visit on 2018      REVIEW OF SYSTEMS:   SOA. Negative for chest pain, fever, cough, abdominal pain. Smokin-2 ppd    I advised Michelle of the risks of continuing to use tobacco, and I provided her with tobacco cessation educational materials in the After Visit Summary.     During this visit, I spent 5 minutes counseling the patient regarding tobacco cessation.      Exam:  Vitals:    19 1437   BP: 140/81   Pulse: 94   SpO2: 95%           19  1437   Weight: 105 kg (231 lb)     Body mass index is 39.63 kg/m². Patient's Body mass index is 39.63 kg/m².  BMI is above normal parameters. Recommendations include: referral to primary care.      Gen:                No distress, conversant, pleasant, appears stated age, alert, oriented  Eyes:               Anicteric sclera, moist conjunctiva, no lid lag                           PERRL, EOMI   Heent:             NC/AT                          Oropharynx clear                          Normal hearing  Lungs:             Normal effort, non-labored breathing                          Clear to auscultation bilaterally          CV:                  Normal S1/S2, no murmur                          No lower extremity edema  ABD:               Soft, rounded, non-distended                          Normal bowel sounds                    Psych:             Appropriate affect  Neuro:             CN 2-12 appear intact    Past Medical History:   Diagnosis Date   • Arthritis of spine    • Chronic obstructive lung disease (CMS/HCC)    • Cirrhosis (CMS/HCC)    • Depression    • Diabetes mellitus (CMS/HCC)    • Hyperlipidemia    • Hypertension    • Neurologic disorder    • Obesity    • Sleep apnea    • Tobacco dependence        Current Outpatient Medications:   •  albuterol (VENTOLIN HFA) 108 (90 Base) MCG/ACT inhaler, Inhale 2 puffs Every 4 (Four) Hours As Needed for Wheezing., Disp: 1 inhaler, Rfl: 0  •  B Complex Vitamins (VITAMIN B COMPLEX) capsule capsule, Take 1 capsule by mouth daily., Disp: , Rfl:   •  B-D UF III MINI PEN NEEDLES 31G X 5 MM misc, Inject 1 applicator under the skin into the appropriate area as directed Daily., Disp: 100 each, Rfl: 2  •  betamethasone dipropionate (DIPROLENE) 0.05 % ointment, Apply  topically 2 (Two) Times a Day., Disp: 45 g, Rfl: 2  •  cholecalciferol (VITAMIN D3) 1000 UNITS tablet, Take 1,000 Units by mouth daily., Disp: , Rfl:   •  ferrous sulfate 325 (65 FE) MG tablet, Take 1 tablet by mouth 2 (Two) Times a Day., Disp: 60 tablet, Rfl: 5  •  glucose blood (ACCU-CHEK KAYKAY PLUS) test strip, 1 each by  Other route Daily. for testing as directed, Disp: 100 each, Rfl: 1  •  Insulin Glargine (BASAGLAR KWIKPEN) 100 UNIT/ML injection pen, INJECT 45 UNITS UNDER THE SKIN INTO THE APPROPRIATE AREA AS DIRECTED EVERY NIGHT, Disp: 15 mL, Rfl: 1  •  levoFLOXacin (LEVAQUIN) 500 MG tablet, Take 1 tablet by mouth Daily., Disp: 7 tablet, Rfl: 0  •  lisinopril (PRINIVIL,ZESTRIL) 5 MG tablet, Take 0.5 tablets by mouth Daily., Disp: 90 tablet, Rfl: 1  •  lovastatin (MEVACOR) 40 MG tablet, Take 1 tablet by mouth Every Night., Disp: 180 tablet, Rfl: 1  •  metFORMIN (GLUCOPHAGE) 1000 MG tablet, Take 1 tablet by mouth 2 (Two) Times a Day With Meals., Disp: 180 tablet, Rfl: 1  •  oxybutynin (DITROPAN) 5 MG tablet, Take 1 tablet by mouth 2 (Two) Times a Day for 30 days., Disp: 60 tablet, Rfl: 1  •  traZODone (DESYREL) 50 MG tablet, Take 1 tablet by mouth Every Night., Disp: 90 tablet, Rfl: 1  •  venlafaxine XR (EFFEXOR-XR) 75 MG 24 hr capsule, Take 1 capsule by mouth Daily., Disp: 90 capsule, Rfl: 3      Assessment and Plan:    1. Obstructive sleep apnea Established, stable (1)  1. Compliant with PAP therapy  2. Continue PAP as prescribed  3. Script for PAP supplies, new machine working well  4. Return to clinic in 1 year with compliance report unless change in symptoms in interim period  2. COPD  3. HTN  4. Type 2 DM        Total time 15 min, more than half spent in face to face counseling and coordination of care.    RTC in 12 months. Patient agrees to return sooner if changes in symptoms.        This document has been electronically signed by MAMTA Stiles on July 1, 2019 2:41 PM          CC: Avelino Fitzgerald APRN          No ref. provider found

## 2019-07-09 RX ORDER — INSULIN GLARGINE 100 [IU]/ML
INJECTION, SOLUTION SUBCUTANEOUS
Qty: 15 ML | Refills: 1 | Status: SHIPPED | OUTPATIENT
Start: 2019-07-09 | End: 2019-09-16 | Stop reason: SDUPTHER

## 2019-08-12 RX ORDER — OXYBUTYNIN CHLORIDE 5 MG/1
TABLET ORAL
Qty: 60 TABLET | Refills: 1 | Status: SHIPPED | OUTPATIENT
Start: 2019-08-12 | End: 2019-08-15

## 2019-08-15 ENCOUNTER — LAB (OUTPATIENT)
Dept: LAB | Facility: HOSPITAL | Age: 74
End: 2019-08-15

## 2019-08-15 ENCOUNTER — OFFICE VISIT (OUTPATIENT)
Dept: FAMILY MEDICINE CLINIC | Facility: CLINIC | Age: 74
End: 2019-08-15

## 2019-08-15 VITALS
SYSTOLIC BLOOD PRESSURE: 100 MMHG | HEART RATE: 71 BPM | WEIGHT: 230.3 LBS | RESPIRATION RATE: 18 BRPM | BODY MASS INDEX: 39.32 KG/M2 | DIASTOLIC BLOOD PRESSURE: 62 MMHG | HEIGHT: 64 IN | OXYGEN SATURATION: 97 %

## 2019-08-15 DIAGNOSIS — Z79.4 DIABETES MELLITUS, TYPE II, INSULIN DEPENDENT (HCC): ICD-10-CM

## 2019-08-15 DIAGNOSIS — N32.81 OVERACTIVE BLADDER: ICD-10-CM

## 2019-08-15 DIAGNOSIS — E11.9 DIABETES MELLITUS, TYPE II, INSULIN DEPENDENT (HCC): ICD-10-CM

## 2019-08-15 DIAGNOSIS — K21.9 GASTROESOPHAGEAL REFLUX DISEASE WITHOUT ESOPHAGITIS: ICD-10-CM

## 2019-08-15 DIAGNOSIS — I10 ESSENTIAL HYPERTENSION: Primary | ICD-10-CM

## 2019-08-15 LAB — HBA1C MFR BLD: 6.98 % (ref 4.8–5.6)

## 2019-08-15 PROCEDURE — 36415 COLL VENOUS BLD VENIPUNCTURE: CPT

## 2019-08-15 PROCEDURE — 83036 HEMOGLOBIN GLYCOSYLATED A1C: CPT

## 2019-08-15 PROCEDURE — 99214 OFFICE O/P EST MOD 30 MIN: CPT | Performed by: NURSE PRACTITIONER

## 2019-08-15 RX ORDER — OMEPRAZOLE 40 MG/1
40 CAPSULE, DELAYED RELEASE ORAL DAILY
Qty: 30 CAPSULE | Refills: 2 | Status: SHIPPED | OUTPATIENT
Start: 2019-08-15 | End: 2019-11-08 | Stop reason: SDUPTHER

## 2019-08-15 NOTE — PROGRESS NOTES
Subjective   Michelle Pierce is a 73 y.o. female.     Ms. Pierce is a 73-year-old female who presents today for follow-up related to hypertension, type 2 diabetes, GERD and overactive bladder.  Blood pressure today is 100/62.  She denies chest pain, shortness of breath, palpitations, weakness or dizziness.  Patient states she checks her blood sugar once a day in the morning.  Recently her blood sugars have been low ranging between 60 and 80 and she is symptomatic at times.  She states occasionally she will check her blood sugar after eating and is noticed it as high as 190.  She does not eat a protein snack in the evening before going to bed.  GERD symptoms currently well controlled.  Overactive bladder symptoms controlled with oxybutynin however patient reports excessive dry mouth.         The following portions of the patient's history were reviewed and updated as appropriate: allergies, current medications, past family history, past medical history, past social history, past surgical history and problem list.    Review of Systems   Constitutional: Negative for activity change, appetite change, fatigue, unexpected weight gain and unexpected weight loss.   HENT: Negative for congestion, sore throat, trouble swallowing and voice change.         Reports dry mouth from oxybutynin   Eyes: Negative.    Respiratory: Negative for cough, chest tightness, shortness of breath and wheezing.    Cardiovascular: Negative for chest pain, palpitations and leg swelling.   Gastrointestinal: Negative for abdominal pain, constipation, diarrhea, nausea and vomiting.   Endocrine: Negative.  Negative for cold intolerance, heat intolerance, polydipsia, polyphagia and polyuria.   Genitourinary: Negative for decreased urine volume, dysuria and frequency.   Musculoskeletal: Negative for arthralgias and myalgias.   Skin: Negative for rash.   Allergic/Immunologic: Negative.    Neurological: Negative.    Hematological: Negative.     Psychiatric/Behavioral: Negative.        Objective   Physical Exam   Constitutional: She is oriented to person, place, and time. She appears well-developed and well-nourished. No distress.   HENT:   Head: Normocephalic and atraumatic.   Eyes: Conjunctivae are normal.   Neck: Normal range of motion.   Cardiovascular: Normal rate, regular rhythm and normal heart sounds.   Pulmonary/Chest: Effort normal and breath sounds normal. No respiratory distress. She has no wheezes. She has no rales.   Musculoskeletal: Normal range of motion. She exhibits no edema or tenderness.   Neurological: She is alert and oriented to person, place, and time.   Skin: Skin is warm and dry. No rash noted. She is not diaphoretic. No erythema. No pallor.   Psychiatric: She has a normal mood and affect. Her behavior is normal. Judgment and thought content normal.   Nursing note and vitals reviewed.        Assessment/Plan   Michelle was seen today for follow-up.    Diagnoses and all orders for this visit:    Essential hypertension    Diabetes mellitus, type II, insulin dependent (CMS/[facility])  -     Hemoglobin A1c; Future    Gastroesophageal reflux disease without esophagitis    Overactive bladder    Other orders  -     omeprazole (priLOSEC) 40 MG capsule; Take 1 capsule by mouth Daily.  -     Mirabegron ER (MYRBETRIQ) 25 MG tablet sustained-release 24 hour 24 hr tablet; Take 1 tablet by mouth Daily.    1.  Essential hypertension-controlled.  No change in therapy at this time.    2.  Type 2 diabetes- hemoglobin A 1C.  Will call with results.  Instructed patient to eat a protein snack in the evening before going to bed to help avert morning hypoglycemia.  We will continue to monitor.    3.  GERD-controlled.  Refill Prilosec 40 mg 1 capsule p.o. daily.    4.  Overactive bladder-discontinue oxybutynin.  Myrbetriq 25 mg 1 tablet p.o. daily.  We will continue to monitor.    5.  Follow-up in 3 months or sooner if needed.            This document has been  electronically signed by MAMTA Gifford on August 15, 2019 5:15 PM

## 2019-08-16 NOTE — PROGRESS NOTES
Hemoglobin A1c at goal of less than 7, 6.98.  No changes in diabetes therapy right now.  Instructed to eat a protein snack before going to bed at night to prevent morning hypoglycemia.

## 2019-08-19 RX ORDER — OXYBUTYNIN CHLORIDE 5 MG/1
5 TABLET ORAL 2 TIMES DAILY
Qty: 60 TABLET | Refills: 3 | Status: SHIPPED | OUTPATIENT
Start: 2019-08-19 | End: 2019-09-18

## 2019-09-16 ENCOUNTER — OFFICE VISIT (OUTPATIENT)
Dept: FAMILY MEDICINE CLINIC | Facility: CLINIC | Age: 74
End: 2019-09-16

## 2019-09-16 VITALS
OXYGEN SATURATION: 98 % | RESPIRATION RATE: 18 BRPM | HEART RATE: 81 BPM | SYSTOLIC BLOOD PRESSURE: 120 MMHG | DIASTOLIC BLOOD PRESSURE: 70 MMHG | WEIGHT: 228.1 LBS | HEIGHT: 64 IN | BODY MASS INDEX: 38.94 KG/M2

## 2019-09-16 DIAGNOSIS — Z23 NEED FOR VACCINATION: ICD-10-CM

## 2019-09-16 DIAGNOSIS — Z23 NEED FOR INFLUENZA VACCINATION: ICD-10-CM

## 2019-09-16 DIAGNOSIS — N32.81 OVERACTIVE BLADDER: Primary | ICD-10-CM

## 2019-09-16 PROCEDURE — 99213 OFFICE O/P EST LOW 20 MIN: CPT | Performed by: NURSE PRACTITIONER

## 2019-09-16 PROCEDURE — G0008 ADMIN INFLUENZA VIRUS VAC: HCPCS | Performed by: NURSE PRACTITIONER

## 2019-09-16 PROCEDURE — 90653 IIV ADJUVANT VACCINE IM: CPT | Performed by: NURSE PRACTITIONER

## 2019-09-16 RX ORDER — INSULIN GLARGINE 100 [IU]/ML
INJECTION, SOLUTION SUBCUTANEOUS
Qty: 15 ML | Refills: 1 | Status: SHIPPED | OUTPATIENT
Start: 2019-09-16 | End: 2019-11-25 | Stop reason: SDUPTHER

## 2019-09-16 NOTE — PROGRESS NOTES
Subjective   Michelle Pierce is a 73 y.o. female.     Ms. Pierce is a 73-year-old female who presents today for follow-up related to overactive bladder and for routine vaccination.  We attempted to place patient on Myrbetriq for overactive bladder symptoms.  However her co-pay was extremely high and she was placed back on oxybutynin.  This does help to control her symptoms however she has severe dry mouth related to this.  She is interested in other treatment options.  She is also due for pneumonia and flu vaccinations today.         The following portions of the patient's history were reviewed and updated as appropriate: allergies, current medications, past family history, past medical history, past social history, past surgical history and problem list.    Review of Systems   Constitutional: Negative for activity change, appetite change, fatigue, unexpected weight gain and unexpected weight loss.   HENT: Negative for congestion, sore throat, trouble swallowing and voice change.    Eyes: Negative.    Respiratory: Negative for cough, chest tightness, shortness of breath and wheezing.    Cardiovascular: Negative for chest pain, palpitations and leg swelling.   Gastrointestinal: Negative for abdominal pain, constipation, diarrhea, nausea and vomiting.   Endocrine: Negative.    Genitourinary: Positive for frequency and urgency. Negative for dysuria.   Musculoskeletal: Negative for arthralgias and myalgias.   Skin: Negative for rash.   Allergic/Immunologic: Negative.    Neurological: Negative.    Hematological: Negative.    Psychiatric/Behavioral: Negative.        Objective   Physical Exam   Constitutional: She is oriented to person, place, and time. She appears well-developed and well-nourished. No distress.   HENT:   Head: Normocephalic and atraumatic.   Eyes: Conjunctivae are normal.   Neck: Normal range of motion.   Cardiovascular: Normal rate, regular rhythm and normal heart sounds.   Pulmonary/Chest: Effort normal and  breath sounds normal. No respiratory distress. She has no wheezes. She has no rales.   Abdominal: Soft. Bowel sounds are normal. She exhibits no distension and no mass. There is no tenderness. There is no rebound and no guarding. No hernia.   Musculoskeletal: Normal range of motion. She exhibits no edema or tenderness.   Neurological: She is alert and oriented to person, place, and time.   Skin: Skin is warm and dry. No rash noted. She is not diaphoretic. No erythema. No pallor.   Psychiatric: She has a normal mood and affect. Her behavior is normal. Judgment and thought content normal.   Nursing note and vitals reviewed.        Assessment/Plan   Michelle was seen today for follow-up.    Diagnoses and all orders for this visit:    Overactive bladder  -     Ambulatory Referral to Urology    Need for influenza vaccination  -     Fluad Quad >65 years ([id number])    Need for vaccination  -     pneumococcal polysaccharide 23-valent (PNEUMOVAX-23) vaccine 0.5 mL    1.  Overactive bladder-continue oxybutynin as prescribed.  Referral to urology for further evaluation.    2.  Health maintenance- flu vaccine and Pneumovax 23 today.    3.  Follow-up in 3 months or sooner if needed.            This document has been electronically signed by MAMTA Gifford on September 16, 2019 1:45 PM

## 2019-09-25 RX ORDER — LISINOPRIL 5 MG/1
2.5 TABLET ORAL DAILY
Qty: 90 TABLET | Refills: 1 | Status: SHIPPED | OUTPATIENT
Start: 2019-09-25 | End: 2020-01-01

## 2019-10-21 RX ORDER — BLOOD SUGAR DIAGNOSTIC
STRIP MISCELLANEOUS
Qty: 100 EACH | Refills: 12 | Status: SHIPPED | OUTPATIENT
Start: 2019-10-21 | End: 2020-01-01

## 2019-10-21 RX ORDER — FLURBIPROFEN SODIUM 0.3 MG/ML
SOLUTION/ DROPS OPHTHALMIC
Qty: 100 EACH | Refills: 12 | Status: SHIPPED | OUTPATIENT
Start: 2019-10-21 | End: 2020-01-01

## 2019-10-28 RX ORDER — LOVASTATIN 40 MG/1
40 TABLET ORAL NIGHTLY
Qty: 90 TABLET | Refills: 1 | Status: SHIPPED | OUTPATIENT
Start: 2019-10-28 | End: 2020-01-01

## 2019-11-08 RX ORDER — OMEPRAZOLE 40 MG/1
CAPSULE, DELAYED RELEASE ORAL
Qty: 30 CAPSULE | Refills: 2 | Status: SHIPPED | OUTPATIENT
Start: 2019-11-08 | End: 2020-02-06

## 2019-11-25 RX ORDER — INSULIN GLARGINE 100 [IU]/ML
INJECTION, SOLUTION SUBCUTANEOUS
Qty: 15 ML | Refills: 1 | Status: SHIPPED | OUTPATIENT
Start: 2019-11-25 | End: 2019-12-16 | Stop reason: SDUPTHER

## 2019-12-16 ENCOUNTER — OFFICE VISIT (OUTPATIENT)
Dept: FAMILY MEDICINE CLINIC | Facility: CLINIC | Age: 74
End: 2019-12-16

## 2019-12-16 VITALS
HEIGHT: 64 IN | OXYGEN SATURATION: 96 % | BODY MASS INDEX: 38.86 KG/M2 | SYSTOLIC BLOOD PRESSURE: 130 MMHG | HEART RATE: 111 BPM | DIASTOLIC BLOOD PRESSURE: 72 MMHG | WEIGHT: 227.6 LBS | RESPIRATION RATE: 18 BRPM

## 2019-12-16 DIAGNOSIS — I10 ESSENTIAL HYPERTENSION: Primary | ICD-10-CM

## 2019-12-16 DIAGNOSIS — Z79.4 DIABETES MELLITUS, TYPE II, INSULIN DEPENDENT (HCC): ICD-10-CM

## 2019-12-16 DIAGNOSIS — N32.81 OVERACTIVE BLADDER: ICD-10-CM

## 2019-12-16 DIAGNOSIS — E11.9 DIABETES MELLITUS, TYPE II, INSULIN DEPENDENT (HCC): ICD-10-CM

## 2019-12-16 DIAGNOSIS — K21.9 GASTROESOPHAGEAL REFLUX DISEASE WITHOUT ESOPHAGITIS: ICD-10-CM

## 2019-12-16 PROCEDURE — 99214 OFFICE O/P EST MOD 30 MIN: CPT | Performed by: NURSE PRACTITIONER

## 2019-12-16 RX ORDER — INSULIN GLARGINE 100 [IU]/ML
40 INJECTION, SOLUTION SUBCUTANEOUS NIGHTLY
Qty: 15 ML | Refills: 1
Start: 2019-12-16 | End: 2020-02-10

## 2019-12-16 RX ORDER — OXYBUTYNIN CHLORIDE 5 MG/1
5 TABLET ORAL 2 TIMES DAILY
Refills: 1 | COMMUNITY
Start: 2019-09-19 | End: 2020-01-01

## 2019-12-16 RX ORDER — SIMETHICONE 80 MG/1
TABLET, CHEWABLE ORAL
Refills: 0 | COMMUNITY
Start: 2019-11-27 | End: 2020-01-01 | Stop reason: HOSPADM

## 2019-12-16 RX ORDER — DOXYCYCLINE HYCLATE 100 MG/1
CAPSULE ORAL
COMMUNITY
Start: 2019-12-02 | End: 2020-01-01

## 2019-12-16 NOTE — PROGRESS NOTES
Subjective   Michelle Pierce is a 74 y.o. female.     Ms. Pierce is a 74-year-old female who presents today for follow-up related to hypertension, type 2 diabetes, overactive bladder and GERD.  Blood pressure today is 130/72.  She denies chest pain, shortness of breath or palpitations.  Patient reports her morning blood sugars are averaging between 66 and 80.  She is eating a protein snack at night but still having low blood sugars in the morning.  She denies any symptoms of hypoglycemia.  Patient has been evaluated by urology for overactive bladder.  She states that she does have a bladder prolapse.  She is considering whether to pursue surgical intervention at this time.  GERD controlled.       The following portions of the patient's history were reviewed and updated as appropriate: allergies, current medications, past family history, past medical history, past social history, past surgical history and problem list.    Review of Systems   Constitutional: Negative for activity change, appetite change, fatigue, unexpected weight gain and unexpected weight loss.   HENT: Negative for congestion, sore throat, trouble swallowing and voice change.    Eyes: Negative.    Respiratory: Negative for cough, chest tightness, shortness of breath and wheezing.    Cardiovascular: Negative for chest pain, palpitations and leg swelling.   Gastrointestinal: Negative for abdominal pain, constipation, diarrhea, nausea and vomiting.   Endocrine: Negative.         Morning glucose averaging 66-80. Denies hypoglycemic symptoms     Genitourinary: Positive for frequency and urgency. Negative for dysuria.   Musculoskeletal: Negative for arthralgias and myalgias.   Skin: Negative for rash.   Allergic/Immunologic: Negative.    Neurological: Negative.    Hematological: Negative.    Psychiatric/Behavioral: Negative.        Objective   Physical Exam   Constitutional: She is oriented to person, place, and time. She appears well-developed and  well-nourished. No distress.   HENT:   Head: Normocephalic and atraumatic.   Eyes: Conjunctivae are normal.   Neck: Normal range of motion.   Cardiovascular: Normal rate, regular rhythm and normal heart sounds.   Pulmonary/Chest: Effort normal and breath sounds normal. No respiratory distress. She has no wheezes. She has no rales.   Abdominal: Soft. Bowel sounds are normal. She exhibits no distension and no mass. There is no tenderness. There is no rebound and no guarding. No hernia.   Musculoskeletal: Normal range of motion. She exhibits no edema or tenderness.   Neurological: She is alert and oriented to person, place, and time.   Skin: Skin is warm and dry. No rash noted. She is not diaphoretic. No erythema. No pallor.   Psychiatric: She has a normal mood and affect. Her behavior is normal. Judgment and thought content normal.   Nursing note and vitals reviewed.        Assessment/Plan   Michelle was seen today for follow-up.    Diagnoses and all orders for this visit:    Essential hypertension    Diabetes mellitus, type II, insulin dependent (CMS/[facility])  -     Microalbumin / Creatinine Urine Ratio - Urine, Clean Catch; Future  -     Hemoglobin A1c; Future    Overactive bladder    Gastroesophageal reflux disease without esophagitis    Other orders  -     Insulin Glargine (BASAGLAR KWIKPEN) 100 UNIT/ML injection pen; Inject 40 Units under the skin into the appropriate area as directed Every Night.    1.  Essential hypertension- no change in therapy at this time.  We will continue to monitor.    2.  Diabetes mellitus type 2 insulin-dependent- microalbumin/creatinine urine ratio, hemoglobin A1c.  Will call with results.  Reinforced diabetic diet medication compliance.  Will slowly decrease Basaglar until morning blood sugars are above 90.  Patient will decrease to 40 units and decrease every 3 days x 2 units until morning blood sugars are above 90.  We will continue to monitor.    3.  Overactive bladder- continue  follow-up with urology.    4.  GERD without esophagitis-controlled.  No change in therapy at this time.    5.  Follow-up in 3 months or sooner if needed.            This document has been electronically signed by MAMTA Gifford on December 16, 2019 4:45 PM

## 2020-01-01 ENCOUNTER — OFFICE VISIT (OUTPATIENT)
Dept: SLEEP MEDICINE | Facility: HOSPITAL | Age: 75
End: 2020-01-01

## 2020-01-01 ENCOUNTER — HOSPITAL ENCOUNTER (OUTPATIENT)
Dept: CT IMAGING | Facility: HOSPITAL | Age: 75
Discharge: HOME | End: 2020-06-01
Admitting: NURSE PRACTITIONER

## 2020-01-01 ENCOUNTER — TELEPHONE (OUTPATIENT)
Dept: FAMILY MEDICINE CLINIC | Facility: CLINIC | Age: 75
End: 2020-01-01

## 2020-01-01 ENCOUNTER — OFFICE VISIT (OUTPATIENT)
Dept: FAMILY MEDICINE CLINIC | Facility: CLINIC | Age: 75
End: 2020-01-01

## 2020-01-01 ENCOUNTER — APPOINTMENT (OUTPATIENT)
Dept: CARDIOLOGY | Facility: HOSPITAL | Age: 75
End: 2020-01-01

## 2020-01-01 ENCOUNTER — TELEPHONE (OUTPATIENT)
Dept: CARDIOLOGY | Facility: CLINIC | Age: 75
End: 2020-01-01

## 2020-01-01 ENCOUNTER — HOSPITAL ENCOUNTER (OUTPATIENT)
Dept: ULTRASOUND IMAGING | Facility: HOSPITAL | Age: 75
Discharge: HOME | End: 2020-09-04
Admitting: NURSE PRACTITIONER

## 2020-01-01 ENCOUNTER — DOCUMENTATION (OUTPATIENT)
Dept: SLEEP MEDICINE | Facility: HOSPITAL | Age: 75
End: 2020-01-01

## 2020-01-01 ENCOUNTER — APPOINTMENT (OUTPATIENT)
Dept: GENERAL RADIOLOGY | Facility: HOSPITAL | Age: 75
End: 2020-01-01

## 2020-01-01 ENCOUNTER — APPOINTMENT (OUTPATIENT)
Dept: CT IMAGING | Facility: HOSPITAL | Age: 75
End: 2020-01-01

## 2020-01-01 ENCOUNTER — ANESTHESIA EVENT (OUTPATIENT)
Dept: GASTROENTEROLOGY | Facility: HOSPITAL | Age: 75
End: 2020-01-01

## 2020-01-01 ENCOUNTER — OFFICE VISIT (OUTPATIENT)
Dept: CARDIOLOGY | Facility: CLINIC | Age: 75
End: 2020-01-01

## 2020-01-01 ENCOUNTER — DOCUMENTATION (OUTPATIENT)
Dept: FAMILY MEDICINE CLINIC | Facility: CLINIC | Age: 75
End: 2020-01-01

## 2020-01-01 ENCOUNTER — LAB (OUTPATIENT)
Dept: LAB | Facility: HOSPITAL | Age: 75
End: 2020-01-01

## 2020-01-01 ENCOUNTER — HOSPITAL ENCOUNTER (INPATIENT)
Facility: HOSPITAL | Age: 75
LOS: 7 days | Discharge: HOME HEALTH | End: 2021-01-04
Attending: STUDENT IN AN ORGANIZED HEALTH CARE EDUCATION/TRAINING PROGRAM | Admitting: HOSPITALIST

## 2020-01-01 ENCOUNTER — ANESTHESIA (OUTPATIENT)
Dept: GASTROENTEROLOGY | Facility: HOSPITAL | Age: 75
End: 2020-01-01

## 2020-01-01 VITALS
HEIGHT: 64 IN | DIASTOLIC BLOOD PRESSURE: 70 MMHG | BODY MASS INDEX: 37.22 KG/M2 | RESPIRATION RATE: 18 BRPM | WEIGHT: 218 LBS | SYSTOLIC BLOOD PRESSURE: 120 MMHG | HEART RATE: 104 BPM | OXYGEN SATURATION: 99 %

## 2020-01-01 VITALS
SYSTOLIC BLOOD PRESSURE: 130 MMHG | RESPIRATION RATE: 18 BRPM | HEART RATE: 95 BPM | WEIGHT: 223.7 LBS | HEIGHT: 64 IN | OXYGEN SATURATION: 96 % | BODY MASS INDEX: 38.19 KG/M2 | DIASTOLIC BLOOD PRESSURE: 70 MMHG

## 2020-01-01 VITALS
SYSTOLIC BLOOD PRESSURE: 138 MMHG | WEIGHT: 222.3 LBS | HEART RATE: 108 BPM | HEIGHT: 64 IN | DIASTOLIC BLOOD PRESSURE: 78 MMHG | OXYGEN SATURATION: 95 % | BODY MASS INDEX: 37.95 KG/M2

## 2020-01-01 VITALS
OXYGEN SATURATION: 94 % | BODY MASS INDEX: 38 KG/M2 | WEIGHT: 222.6 LBS | DIASTOLIC BLOOD PRESSURE: 70 MMHG | HEIGHT: 64 IN | SYSTOLIC BLOOD PRESSURE: 118 MMHG | HEART RATE: 68 BPM

## 2020-01-01 DIAGNOSIS — E08.628 DIABETES MELLITUS DUE TO UNDERLYING CONDITION WITH OTHER SKIN COMPLICATIONS (HCC): ICD-10-CM

## 2020-01-01 DIAGNOSIS — G47.33 OBSTRUCTIVE SLEEP APNEA SYNDROME: ICD-10-CM

## 2020-01-01 DIAGNOSIS — R40.4 EPISODE OF ALTERED CONSCIOUSNESS: ICD-10-CM

## 2020-01-01 DIAGNOSIS — I10 ESSENTIAL HYPERTENSION: ICD-10-CM

## 2020-01-01 DIAGNOSIS — Z86.73 PERSONAL HISTORY OF TRANSIENT ISCHEMIC ATTACK (TIA), AND CEREBRAL INFARCTION WITHOUT RESIDUAL DEFICITS: ICD-10-CM

## 2020-01-01 DIAGNOSIS — E11.9 DIABETES MELLITUS, TYPE II, INSULIN DEPENDENT (HCC): ICD-10-CM

## 2020-01-01 DIAGNOSIS — Z79.4 DIABETES MELLITUS, TYPE II, INSULIN DEPENDENT (HCC): ICD-10-CM

## 2020-01-01 DIAGNOSIS — M43.10 SPONDYLOLISTHESIS, ACQUIRED: ICD-10-CM

## 2020-01-01 DIAGNOSIS — I48.92 ATRIAL FLUTTER, PAROXYSMAL (HCC): Primary | ICD-10-CM

## 2020-01-01 DIAGNOSIS — I45.10 RBBB: Primary | ICD-10-CM

## 2020-01-01 DIAGNOSIS — E78.2 MIXED HYPERLIPIDEMIA: ICD-10-CM

## 2020-01-01 DIAGNOSIS — I65.23 BILATERAL CAROTID ARTERY STENOSIS: ICD-10-CM

## 2020-01-01 DIAGNOSIS — R59.0 LYMPHADENOPATHY, MEDIASTINAL: ICD-10-CM

## 2020-01-01 DIAGNOSIS — Z53.20 SCREENING MAMMOGRAPHY DECLINED: ICD-10-CM

## 2020-01-01 DIAGNOSIS — F33.0 MILD EPISODE OF RECURRENT MAJOR DEPRESSIVE DISORDER (HCC): ICD-10-CM

## 2020-01-01 DIAGNOSIS — I48.92 ATRIAL FLUTTER, PAROXYSMAL (HCC): ICD-10-CM

## 2020-01-01 DIAGNOSIS — M25.561 PAIN IN BOTH KNEES, UNSPECIFIED CHRONICITY: ICD-10-CM

## 2020-01-01 DIAGNOSIS — F17.200 TOBACCO DEPENDENCE: ICD-10-CM

## 2020-01-01 DIAGNOSIS — Z74.09 IMPAIRED MOBILITY AND ADLS: ICD-10-CM

## 2020-01-01 DIAGNOSIS — J44.9 CHRONIC OBSTRUCTIVE PULMONARY DISEASE, UNSPECIFIED COPD TYPE (HCC): ICD-10-CM

## 2020-01-01 DIAGNOSIS — R06.09 DYSPNEA ON EXERTION: Primary | ICD-10-CM

## 2020-01-01 DIAGNOSIS — I45.10 RBBB: ICD-10-CM

## 2020-01-01 DIAGNOSIS — R06.09 DYSPNEA ON EXERTION: ICD-10-CM

## 2020-01-01 DIAGNOSIS — N32.81 OVERACTIVE BLADDER: ICD-10-CM

## 2020-01-01 DIAGNOSIS — R50.9 FEVER, UNSPECIFIED FEVER CAUSE: Primary | ICD-10-CM

## 2020-01-01 DIAGNOSIS — I45.10 NEW ONSET RIGHT BUNDLE BRANCH BLOCK (RBBB): Primary | ICD-10-CM

## 2020-01-01 DIAGNOSIS — M25.562 PAIN IN BOTH KNEES, UNSPECIFIED CHRONICITY: ICD-10-CM

## 2020-01-01 DIAGNOSIS — C44.90 SKIN CANCER: ICD-10-CM

## 2020-01-01 DIAGNOSIS — M53.2X6 SPINAL INSTABILITY, LUMBAR: ICD-10-CM

## 2020-01-01 DIAGNOSIS — I35.8 ENDOCARDITIS OF AORTIC VALVE: ICD-10-CM

## 2020-01-01 DIAGNOSIS — R59.0 MEDIASTINAL LYMPHADENOPATHY: ICD-10-CM

## 2020-01-01 DIAGNOSIS — F51.01 PRIMARY INSOMNIA: ICD-10-CM

## 2020-01-01 DIAGNOSIS — E61.1 IRON DEFICIENCY: ICD-10-CM

## 2020-01-01 DIAGNOSIS — M17.0 PRIMARY OSTEOARTHRITIS OF BOTH KNEES: ICD-10-CM

## 2020-01-01 DIAGNOSIS — K21.9 GASTROESOPHAGEAL REFLUX DISEASE WITHOUT ESOPHAGITIS: ICD-10-CM

## 2020-01-01 DIAGNOSIS — G47.33 OBSTRUCTIVE SLEEP APNEA, ADULT: Primary | ICD-10-CM

## 2020-01-01 DIAGNOSIS — R91.8 LUNG INFILTRATE: ICD-10-CM

## 2020-01-01 DIAGNOSIS — Z00.00 MEDICARE ANNUAL WELLNESS VISIT, INITIAL: ICD-10-CM

## 2020-01-01 DIAGNOSIS — I10 ESSENTIAL HYPERTENSION: Primary | ICD-10-CM

## 2020-01-01 DIAGNOSIS — R78.81 STREPTOCOCCAL BACTEREMIA: ICD-10-CM

## 2020-01-01 DIAGNOSIS — E11.9 ENCOUNTER FOR DIABETIC FOOT EXAM (HCC): ICD-10-CM

## 2020-01-01 DIAGNOSIS — F41.8 SITUATIONAL ANXIETY: Primary | ICD-10-CM

## 2020-01-01 DIAGNOSIS — Z00.00 MEDICARE ANNUAL WELLNESS VISIT, INITIAL: Primary | ICD-10-CM

## 2020-01-01 DIAGNOSIS — A41.9 SEPSIS, DUE TO UNSPECIFIED ORGANISM, UNSPECIFIED WHETHER ACUTE ORGAN DYSFUNCTION PRESENT (HCC): ICD-10-CM

## 2020-01-01 DIAGNOSIS — Z74.09 IMPAIRED FUNCTIONAL MOBILITY, BALANCE, GAIT, AND ENDURANCE: ICD-10-CM

## 2020-01-01 DIAGNOSIS — B95.5 STREPTOCOCCAL BACTEREMIA: ICD-10-CM

## 2020-01-01 DIAGNOSIS — Z78.9 IMPAIRED MOBILITY AND ADLS: ICD-10-CM

## 2020-01-01 LAB
ALBUMIN SERPL-MCNC: 3.6 G/DL (ref 3.5–5.2)
ALBUMIN SERPL-MCNC: 4.1 G/DL (ref 3.5–5.2)
ALBUMIN UR-MCNC: <1.2 MG/DL
ALBUMIN/GLOB SERPL: 1 G/DL
ALBUMIN/GLOB SERPL: 1.3 G/DL
ALP SERPL-CCNC: 76 U/L (ref 39–117)
ALP SERPL-CCNC: 85 U/L (ref 39–117)
ALT SERPL W P-5'-P-CCNC: 10 U/L (ref 1–33)
ALT SERPL W P-5'-P-CCNC: 17 U/L (ref 1–33)
ANION GAP SERPL CALCULATED.3IONS-SCNC: 10 MMOL/L (ref 5–15)
ANION GAP SERPL CALCULATED.3IONS-SCNC: 13.1 MMOL/L (ref 5–15)
ANION GAP SERPL CALCULATED.3IONS-SCNC: 7 MMOL/L (ref 5–15)
ANION GAP SERPL CALCULATED.3IONS-SCNC: 7 MMOL/L (ref 5–15)
ANION GAP SERPL CALCULATED.3IONS-SCNC: 8 MMOL/L (ref 5–15)
ANION GAP SERPL CALCULATED.3IONS-SCNC: 9 MMOL/L (ref 5–15)
AST SERPL-CCNC: 12 U/L (ref 1–32)
AST SERPL-CCNC: 16 U/L (ref 1–32)
BACTERIA BLD CULT: ABNORMAL
BACTERIA SPEC AEROBE CULT: ABNORMAL
BACTERIA SPEC AEROBE CULT: ABNORMAL
BASOPHILS # BLD AUTO: 0.03 10*3/MM3 (ref 0–0.2)
BASOPHILS # BLD AUTO: 0.04 10*3/MM3 (ref 0–0.2)
BASOPHILS # BLD AUTO: 0.06 10*3/MM3 (ref 0–0.2)
BASOPHILS NFR BLD AUTO: 0.4 % (ref 0–1.5)
BASOPHILS NFR BLD AUTO: 0.4 % (ref 0–1.5)
BASOPHILS NFR BLD AUTO: 0.5 % (ref 0–1.5)
BASOPHILS NFR BLD AUTO: 0.5 % (ref 0–1.5)
BASOPHILS NFR BLD AUTO: 0.6 % (ref 0–1.5)
BASOPHILS NFR BLD AUTO: 0.7 % (ref 0–1.5)
BH CV ECHO MEAS - AO MAX PG (FULL): 3.1 MMHG
BH CV ECHO MEAS - AO MAX PG: 11.4 MMHG
BH CV ECHO MEAS - AO MEAN PG (FULL): 2 MMHG
BH CV ECHO MEAS - AO MEAN PG: 6 MMHG
BH CV ECHO MEAS - AO V2 MAX: 169 CM/SEC
BH CV ECHO MEAS - AO V2 MEAN: 117 CM/SEC
BH CV ECHO MEAS - AO V2 VTI: 34.4 CM
BH CV ECHO MEAS - ASC AORTA: 3.1 CM
BH CV ECHO MEAS - AVA(I,A): 3.4 CM^2
BH CV ECHO MEAS - AVA(I,D): 3.4 CM^2
BH CV ECHO MEAS - AVA(V,A): 3.2 CM^2
BH CV ECHO MEAS - AVA(V,D): 3.2 CM^2
BH CV ECHO MEAS - BSA(HAYCOCK): 2.2 M^2
BH CV ECHO MEAS - BSA(HAYCOCK): 2.2 M^2
BH CV ECHO MEAS - BSA: 2 M^2
BH CV ECHO MEAS - BSA: 2.1 M^2
BH CV ECHO MEAS - BZI_BMI: 37.8 KILOGRAMS/M^2
BH CV ECHO MEAS - BZI_BMI: 39.5 KILOGRAMS/M^2
BH CV ECHO MEAS - BZI_METRIC_HEIGHT: 162.6 CM
BH CV ECHO MEAS - BZI_METRIC_HEIGHT: 162.6 CM
BH CV ECHO MEAS - BZI_METRIC_WEIGHT: 104.3 KG
BH CV ECHO MEAS - BZI_METRIC_WEIGHT: 99.8 KG
BH CV ECHO MEAS - EDV(CUBED): 196.1 ML
BH CV ECHO MEAS - EDV(MOD-SP4): 57.7 ML
BH CV ECHO MEAS - EDV(TEICH): 167.2 ML
BH CV ECHO MEAS - EF(CUBED): 68.3 %
BH CV ECHO MEAS - EF(MOD-SP4): 48.5 %
BH CV ECHO MEAS - EF(TEICH): 59.1 %
BH CV ECHO MEAS - ESV(CUBED): 62.1 ML
BH CV ECHO MEAS - ESV(MOD-SP4): 29.7 ML
BH CV ECHO MEAS - ESV(TEICH): 68.3 ML
BH CV ECHO MEAS - FS: 31.8 %
BH CV ECHO MEAS - IVS/LVPW: 0.91
BH CV ECHO MEAS - IVSD: 0.94 CM
BH CV ECHO MEAS - LA DIMENSION: 4.7 CM
BH CV ECHO MEAS - LV DIASTOLIC VOL/BSA (35-75): 27.8 ML/M^2
BH CV ECHO MEAS - LV MASS(C)D: 229.1 GRAMS
BH CV ECHO MEAS - LV MASS(C)DI: 110.4 GRAMS/M^2
BH CV ECHO MEAS - LV MAX PG: 8.3 MMHG
BH CV ECHO MEAS - LV MEAN PG: 4 MMHG
BH CV ECHO MEAS - LV SYSTOLIC VOL/BSA (12-30): 14.3 ML/M^2
BH CV ECHO MEAS - LV V1 MAX: 144 CM/SEC
BH CV ECHO MEAS - LV V1 MEAN: 87.6 CM/SEC
BH CV ECHO MEAS - LV V1 VTI: 30.4 CM
BH CV ECHO MEAS - LVIDD: 5.8 CM
BH CV ECHO MEAS - LVIDS: 4 CM
BH CV ECHO MEAS - LVLD AP4: 6.2 CM
BH CV ECHO MEAS - LVLS AP4: 5.9 CM
BH CV ECHO MEAS - LVOT AREA (M): 3.8 CM^2
BH CV ECHO MEAS - LVOT AREA: 3.8 CM^2
BH CV ECHO MEAS - LVOT DIAM: 2.2 CM
BH CV ECHO MEAS - LVPWD: 1 CM
BH CV ECHO MEAS - RAP SYSTOLE: 5 MMHG
BH CV ECHO MEAS - RVDD: 3 CM
BH CV ECHO MEAS - RVSP: 36.8 MMHG
BH CV ECHO MEAS - SI(CUBED): 64.6 ML/M^2
BH CV ECHO MEAS - SI(LVOT): 55.7 ML/M^2
BH CV ECHO MEAS - SI(MOD-SP4): 13.5 ML/M^2
BH CV ECHO MEAS - SI(TEICH): 47.6 ML/M^2
BH CV ECHO MEAS - SV(CUBED): 134 ML
BH CV ECHO MEAS - SV(LVOT): 115.6 ML
BH CV ECHO MEAS - SV(MOD-SP4): 28 ML
BH CV ECHO MEAS - SV(TEICH): 98.9 ML
BH CV ECHO MEAS - TR MAX VEL: 282 CM/SEC
BILIRUB SERPL-MCNC: 0.2 MG/DL (ref 0–1.2)
BILIRUB SERPL-MCNC: 0.2 MG/DL (ref 0–1.2)
BILIRUB UR QL STRIP: NEGATIVE
BUN SERPL-MCNC: 11 MG/DL (ref 8–23)
BUN SERPL-MCNC: 12 MG/DL (ref 8–23)
BUN SERPL-MCNC: 14 MG/DL (ref 8–23)
BUN SERPL-MCNC: 15 MG/DL (ref 8–23)
BUN/CREAT SERPL: 16.4 (ref 7–25)
BUN/CREAT SERPL: 17.1 (ref 7–25)
BUN/CREAT SERPL: 17.1 (ref 7–25)
BUN/CREAT SERPL: 18.7 (ref 7–25)
BUN/CREAT SERPL: 20.9 (ref 7–25)
BUN/CREAT SERPL: 22.4 (ref 7–25)
CALCIUM SPEC-SCNC: 9.1 MG/DL (ref 8.6–10.5)
CALCIUM SPEC-SCNC: 9.1 MG/DL (ref 8.6–10.5)
CALCIUM SPEC-SCNC: 9.4 MG/DL (ref 8.6–10.5)
CALCIUM SPEC-SCNC: 9.7 MG/DL (ref 8.6–10.5)
CALCIUM SPEC-SCNC: 9.7 MG/DL (ref 8.6–10.5)
CALCIUM SPEC-SCNC: 9.9 MG/DL (ref 8.6–10.5)
CHLORIDE SERPL-SCNC: 100 MMOL/L (ref 98–107)
CHLORIDE SERPL-SCNC: 101 MMOL/L (ref 98–107)
CHLORIDE SERPL-SCNC: 101 MMOL/L (ref 98–107)
CHLORIDE SERPL-SCNC: 97 MMOL/L (ref 98–107)
CHOLEST SERPL-MCNC: 184 MG/DL (ref 0–200)
CLARITY UR: CLEAR
CO2 SERPL-SCNC: 21.9 MMOL/L (ref 22–29)
CO2 SERPL-SCNC: 23 MMOL/L (ref 22–29)
CO2 SERPL-SCNC: 26 MMOL/L (ref 22–29)
CO2 SERPL-SCNC: 29 MMOL/L (ref 22–29)
COLOR UR: YELLOW
CREAT SERPL-MCNC: 0.67 MG/DL (ref 0.57–1)
CREAT SERPL-MCNC: 0.7 MG/DL (ref 0.57–1)
CREAT SERPL-MCNC: 0.75 MG/DL (ref 0.57–1)
CREAT SERPL-MCNC: 0.82 MG/DL (ref 0.57–1)
CREAT UR-MCNC: 49.4 MG/DL
D-LACTATE SERPL-SCNC: 1.8 MMOL/L (ref 0.5–2)
D-LACTATE SERPL-SCNC: 3.5 MMOL/L (ref 0.5–2)
DEPRECATED RDW RBC AUTO: 41.5 FL (ref 37–54)
DEPRECATED RDW RBC AUTO: 42.3 FL (ref 37–54)
DEPRECATED RDW RBC AUTO: 42.8 FL (ref 37–54)
DEPRECATED RDW RBC AUTO: 43.1 FL (ref 37–54)
DEPRECATED RDW RBC AUTO: 43.8 FL (ref 37–54)
DEPRECATED RDW RBC AUTO: 44.2 FL (ref 37–54)
EOSINOPHIL # BLD AUTO: 0.26 10*3/MM3 (ref 0–0.4)
EOSINOPHIL # BLD AUTO: 0.27 10*3/MM3 (ref 0–0.4)
EOSINOPHIL # BLD AUTO: 0.3 10*3/MM3 (ref 0–0.4)
EOSINOPHIL # BLD AUTO: 0.33 10*3/MM3 (ref 0–0.4)
EOSINOPHIL # BLD AUTO: 0.36 10*3/MM3 (ref 0–0.4)
EOSINOPHIL # BLD AUTO: 0.42 10*3/MM3 (ref 0–0.4)
EOSINOPHIL NFR BLD AUTO: 2.4 % (ref 0.3–6.2)
EOSINOPHIL NFR BLD AUTO: 3.2 % (ref 0.3–6.2)
EOSINOPHIL NFR BLD AUTO: 3.6 % (ref 0.3–6.2)
EOSINOPHIL NFR BLD AUTO: 3.7 % (ref 0.3–6.2)
EOSINOPHIL NFR BLD AUTO: 4.9 % (ref 0.3–6.2)
EOSINOPHIL NFR BLD AUTO: 5.8 % (ref 0.3–6.2)
ERYTHROCYTE [DISTWIDTH] IN BLOOD BY AUTOMATED COUNT: 13.3 % (ref 12.3–15.4)
ERYTHROCYTE [DISTWIDTH] IN BLOOD BY AUTOMATED COUNT: 13.7 % (ref 12.3–15.4)
ERYTHROCYTE [DISTWIDTH] IN BLOOD BY AUTOMATED COUNT: 13.7 % (ref 12.3–15.4)
ERYTHROCYTE [DISTWIDTH] IN BLOOD BY AUTOMATED COUNT: 13.9 % (ref 12.3–15.4)
ERYTHROCYTE [DISTWIDTH] IN BLOOD BY AUTOMATED COUNT: 13.9 % (ref 12.3–15.4)
ERYTHROCYTE [DISTWIDTH] IN BLOOD BY AUTOMATED COUNT: 14 % (ref 12.3–15.4)
FERRITIN SERPL-MCNC: 33.7 NG/ML (ref 13–150)
FLUAV RNA RESP QL NAA+PROBE: NOT DETECTED
FLUBV RNA RESP QL NAA+PROBE: NOT DETECTED
GENTAMICIN SERPL-MCNC: 0.8 MCG/ML (ref 0.5–10)
GENTAMICIN SERPL-MCNC: 3.5 MCG/ML (ref 0.5–10)
GFR SERPL CREATININE-BSD FRML MDRD: 68 ML/MIN/1.73
GFR SERPL CREATININE-BSD FRML MDRD: 76 ML/MIN/1.73
GFR SERPL CREATININE-BSD FRML MDRD: 82 ML/MIN/1.73
GFR SERPL CREATININE-BSD FRML MDRD: 86 ML/MIN/1.73
GFR SERPL CREATININE-BSD FRML MDRD: 86 ML/MIN/1.73
GFR SERPL CREATININE-BSD FRML MDRD: 87 ML/MIN/1.73
GLOBULIN UR ELPH-MCNC: 3.2 GM/DL
GLOBULIN UR ELPH-MCNC: 3.6 GM/DL
GLUCOSE BLDC GLUCOMTR-MCNC: 102 MG/DL (ref 70–130)
GLUCOSE BLDC GLUCOMTR-MCNC: 106 MG/DL (ref 70–130)
GLUCOSE BLDC GLUCOMTR-MCNC: 114 MG/DL (ref 70–130)
GLUCOSE BLDC GLUCOMTR-MCNC: 122 MG/DL (ref 70–130)
GLUCOSE BLDC GLUCOMTR-MCNC: 123 MG/DL (ref 70–130)
GLUCOSE BLDC GLUCOMTR-MCNC: 123 MG/DL (ref 70–130)
GLUCOSE BLDC GLUCOMTR-MCNC: 131 MG/DL (ref 70–130)
GLUCOSE BLDC GLUCOMTR-MCNC: 133 MG/DL (ref 70–130)
GLUCOSE BLDC GLUCOMTR-MCNC: 155 MG/DL (ref 70–130)
GLUCOSE BLDC GLUCOMTR-MCNC: 161 MG/DL (ref 70–130)
GLUCOSE BLDC GLUCOMTR-MCNC: 163 MG/DL (ref 70–130)
GLUCOSE BLDC GLUCOMTR-MCNC: 167 MG/DL (ref 70–130)
GLUCOSE BLDC GLUCOMTR-MCNC: 167 MG/DL (ref 70–130)
GLUCOSE BLDC GLUCOMTR-MCNC: 169 MG/DL (ref 70–130)
GLUCOSE BLDC GLUCOMTR-MCNC: 172 MG/DL (ref 70–130)
GLUCOSE BLDC GLUCOMTR-MCNC: 180 MG/DL (ref 70–130)
GLUCOSE BLDC GLUCOMTR-MCNC: 185 MG/DL (ref 70–130)
GLUCOSE BLDC GLUCOMTR-MCNC: 187 MG/DL (ref 70–130)
GLUCOSE BLDC GLUCOMTR-MCNC: 204 MG/DL (ref 70–130)
GLUCOSE BLDC GLUCOMTR-MCNC: 254 MG/DL (ref 70–130)
GLUCOSE BLDC GLUCOMTR-MCNC: 274 MG/DL (ref 70–130)
GLUCOSE SERPL-MCNC: 116 MG/DL (ref 65–99)
GLUCOSE SERPL-MCNC: 122 MG/DL (ref 65–99)
GLUCOSE SERPL-MCNC: 125 MG/DL (ref 65–99)
GLUCOSE SERPL-MCNC: 152 MG/DL (ref 65–99)
GLUCOSE SERPL-MCNC: 173 MG/DL (ref 65–99)
GLUCOSE SERPL-MCNC: 259 MG/DL (ref 65–99)
GLUCOSE UR STRIP-MCNC: ABNORMAL MG/DL
GRAM STN SPEC: ABNORMAL
HBA1C MFR BLD: 7.42 % (ref 4.8–5.6)
HCT VFR BLD AUTO: 31.8 % (ref 34–46.6)
HCT VFR BLD AUTO: 32 % (ref 34–46.6)
HCT VFR BLD AUTO: 33.9 % (ref 34–46.6)
HCT VFR BLD AUTO: 33.9 % (ref 34–46.6)
HCT VFR BLD AUTO: 35.2 % (ref 34–46.6)
HCT VFR BLD AUTO: 37.9 % (ref 34–46.6)
HDLC SERPL-MCNC: 51 MG/DL (ref 40–60)
HGB BLD-MCNC: 10.2 G/DL (ref 12–15.9)
HGB BLD-MCNC: 10.6 G/DL (ref 12–15.9)
HGB BLD-MCNC: 10.9 G/DL (ref 12–15.9)
HGB BLD-MCNC: 11.3 G/DL (ref 12–15.9)
HGB BLD-MCNC: 11.5 G/DL (ref 12–15.9)
HGB BLD-MCNC: 12.8 G/DL (ref 12–15.9)
HGB UR QL STRIP.AUTO: NEGATIVE
HOLD SPECIMEN: NORMAL
HOLD SPECIMEN: NORMAL
IMM GRANULOCYTES # BLD AUTO: 0.02 10*3/MM3 (ref 0–0.05)
IMM GRANULOCYTES # BLD AUTO: 0.03 10*3/MM3 (ref 0–0.05)
IMM GRANULOCYTES # BLD AUTO: 0.04 10*3/MM3 (ref 0–0.05)
IMM GRANULOCYTES NFR BLD AUTO: 0.3 % (ref 0–0.5)
IMM GRANULOCYTES NFR BLD AUTO: 0.3 % (ref 0–0.5)
IMM GRANULOCYTES NFR BLD AUTO: 0.4 % (ref 0–0.5)
IRON 24H UR-MRATE: 62 MCG/DL (ref 37–145)
IRON SATN MFR SERPL: 16 % (ref 20–50)
ISOLATED FROM: ABNORMAL
ISOLATED FROM: ABNORMAL
KETONES UR QL STRIP: NEGATIVE
LACTATE HOLD SPECIMEN: NORMAL
LDLC SERPL CALC-MCNC: 94 MG/DL (ref 0–100)
LDLC/HDLC SERPL: 1.84 {RATIO}
LEUKOCYTE ESTERASE UR QL STRIP.AUTO: NEGATIVE
LIPASE SERPL-CCNC: 28 U/L (ref 13–60)
LYMPHOCYTES # BLD AUTO: 1.25 10*3/MM3 (ref 0.7–3.1)
LYMPHOCYTES # BLD AUTO: 1.36 10*3/MM3 (ref 0.7–3.1)
LYMPHOCYTES # BLD AUTO: 1.47 10*3/MM3 (ref 0.7–3.1)
LYMPHOCYTES # BLD AUTO: 1.5 10*3/MM3 (ref 0.7–3.1)
LYMPHOCYTES # BLD AUTO: 1.9 10*3/MM3 (ref 0.7–3.1)
LYMPHOCYTES # BLD AUTO: 2.61 10*3/MM3 (ref 0.7–3.1)
LYMPHOCYTES NFR BLD AUTO: 15.3 % (ref 19.6–45.3)
LYMPHOCYTES NFR BLD AUTO: 16.8 % (ref 19.6–45.3)
LYMPHOCYTES NFR BLD AUTO: 17.8 % (ref 19.6–45.3)
LYMPHOCYTES NFR BLD AUTO: 18.4 % (ref 19.6–45.3)
LYMPHOCYTES NFR BLD AUTO: 20.5 % (ref 19.6–45.3)
LYMPHOCYTES NFR BLD AUTO: 29.2 % (ref 19.6–45.3)
MAXIMAL PREDICTED HEART RATE: 147 BPM
MAXIMAL PREDICTED HEART RATE: 147 BPM
MCH RBC QN AUTO: 27.7 PG (ref 26.6–33)
MCH RBC QN AUTO: 27.9 PG (ref 26.6–33)
MCH RBC QN AUTO: 28 PG (ref 26.6–33)
MCH RBC QN AUTO: 28.2 PG (ref 26.6–33)
MCH RBC QN AUTO: 28.2 PG (ref 26.6–33)
MCH RBC QN AUTO: 29.2 PG (ref 26.6–33)
MCHC RBC AUTO-ENTMCNC: 31.9 G/DL (ref 31.5–35.7)
MCHC RBC AUTO-ENTMCNC: 32.2 G/DL (ref 31.5–35.7)
MCHC RBC AUTO-ENTMCNC: 32.7 G/DL (ref 31.5–35.7)
MCHC RBC AUTO-ENTMCNC: 33.3 G/DL (ref 31.5–35.7)
MCHC RBC AUTO-ENTMCNC: 33.3 G/DL (ref 31.5–35.7)
MCHC RBC AUTO-ENTMCNC: 33.8 G/DL (ref 31.5–35.7)
MCV RBC AUTO: 84.1 FL (ref 79–97)
MCV RBC AUTO: 84.5 FL (ref 79–97)
MCV RBC AUTO: 86.3 FL (ref 79–97)
MCV RBC AUTO: 86.3 FL (ref 79–97)
MCV RBC AUTO: 86.9 FL (ref 79–97)
MCV RBC AUTO: 87 FL (ref 79–97)
MICROALBUMIN/CREAT UR: NORMAL MG/G{CREAT}
MONOCYTES # BLD AUTO: 0.66 10*3/MM3 (ref 0.1–0.9)
MONOCYTES # BLD AUTO: 0.66 10*3/MM3 (ref 0.1–0.9)
MONOCYTES # BLD AUTO: 0.71 10*3/MM3 (ref 0.1–0.9)
MONOCYTES # BLD AUTO: 0.77 10*3/MM3 (ref 0.1–0.9)
MONOCYTES # BLD AUTO: 0.81 10*3/MM3 (ref 0.1–0.9)
MONOCYTES # BLD AUTO: 0.91 10*3/MM3 (ref 0.1–0.9)
MONOCYTES NFR BLD AUTO: 8 % (ref 5–12)
MONOCYTES NFR BLD AUTO: 8.1 % (ref 5–12)
MONOCYTES NFR BLD AUTO: 8.6 % (ref 5–12)
MONOCYTES NFR BLD AUTO: 9.2 % (ref 5–12)
MONOCYTES NFR BLD AUTO: 9.6 % (ref 5–12)
MONOCYTES NFR BLD AUTO: 9.6 % (ref 5–12)
NEUTROPHILS NFR BLD AUTO: 4.56 10*3/MM3 (ref 1.7–7)
NEUTROPHILS NFR BLD AUTO: 4.89 10*3/MM3 (ref 1.7–7)
NEUTROPHILS NFR BLD AUTO: 5.14 10*3/MM3 (ref 1.7–7)
NEUTROPHILS NFR BLD AUTO: 5.75 10*3/MM3 (ref 1.7–7)
NEUTROPHILS NFR BLD AUTO: 5.93 10*3/MM3 (ref 1.7–7)
NEUTROPHILS NFR BLD AUTO: 57.5 % (ref 42.7–76)
NEUTROPHILS NFR BLD AUTO: 63.5 % (ref 42.7–76)
NEUTROPHILS NFR BLD AUTO: 66.3 % (ref 42.7–76)
NEUTROPHILS NFR BLD AUTO: 68.2 % (ref 42.7–76)
NEUTROPHILS NFR BLD AUTO: 72 % (ref 42.7–76)
NEUTROPHILS NFR BLD AUTO: 72.5 % (ref 42.7–76)
NEUTROPHILS NFR BLD AUTO: 8.18 10*3/MM3 (ref 1.7–7)
NITRITE UR QL STRIP: NEGATIVE
NRBC BLD AUTO-RTO: 0 /100 WBC (ref 0–0.2)
NT-PROBNP SERPL-MCNC: 299.9 PG/ML (ref 0–900)
PH UR STRIP.AUTO: 6 [PH] (ref 5–9)
PLATELET # BLD AUTO: 234 10*3/MM3 (ref 140–450)
PLATELET # BLD AUTO: 251 10*3/MM3 (ref 140–450)
PLATELET # BLD AUTO: 256 10*3/MM3 (ref 140–450)
PLATELET # BLD AUTO: 306 10*3/MM3 (ref 140–450)
PLATELET # BLD AUTO: 309 10*3/MM3 (ref 140–450)
PLATELET # BLD AUTO: 330 10*3/MM3 (ref 140–450)
PMV BLD AUTO: 8.6 FL (ref 6–12)
PMV BLD AUTO: 8.9 FL (ref 6–12)
PMV BLD AUTO: 8.9 FL (ref 6–12)
PMV BLD AUTO: 9 FL (ref 6–12)
PMV BLD AUTO: 9 FL (ref 6–12)
PMV BLD AUTO: 9.9 FL (ref 6–12)
POTASSIUM SERPL-SCNC: 4.1 MMOL/L (ref 3.5–5.2)
POTASSIUM SERPL-SCNC: 4.2 MMOL/L (ref 3.5–5.2)
POTASSIUM SERPL-SCNC: 4.3 MMOL/L (ref 3.5–5.2)
POTASSIUM SERPL-SCNC: 4.4 MMOL/L (ref 3.5–5.2)
POTASSIUM SERPL-SCNC: 4.9 MMOL/L (ref 3.5–5.2)
POTASSIUM SERPL-SCNC: 5.2 MMOL/L (ref 3.5–5.2)
PROT SERPL-MCNC: 7.2 G/DL (ref 6–8.5)
PROT SERPL-MCNC: 7.3 G/DL (ref 6–8.5)
PROT UR QL STRIP: NEGATIVE
QT INTERVAL: 442 MS
QT INTERVAL: 464 MS
QT INTERVAL: 464 MS
QT INTERVAL: 478 MS
QTC INTERVAL: 452 MS
QTC INTERVAL: 474 MS
QTC INTERVAL: 482 MS
QTC INTERVAL: 489 MS
RBC # BLD AUTO: 3.68 10*6/MM3 (ref 3.77–5.28)
RBC # BLD AUTO: 3.78 10*6/MM3 (ref 3.77–5.28)
RBC # BLD AUTO: 3.9 10*6/MM3 (ref 3.77–5.28)
RBC # BLD AUTO: 4.01 10*6/MM3 (ref 3.77–5.28)
RBC # BLD AUTO: 4.08 10*6/MM3 (ref 3.77–5.28)
RBC # BLD AUTO: 4.39 10*6/MM3 (ref 3.77–5.28)
SARS-COV-2 N GENE RESP QL NAA+PROBE: NOT DETECTED
SARS-COV-2 RNA RESP QL NAA+PROBE: NOT DETECTED
SODIUM SERPL-SCNC: 130 MMOL/L (ref 136–145)
SODIUM SERPL-SCNC: 133 MMOL/L (ref 136–145)
SODIUM SERPL-SCNC: 134 MMOL/L (ref 136–145)
SODIUM SERPL-SCNC: 135 MMOL/L (ref 136–145)
SODIUM SERPL-SCNC: 136 MMOL/L (ref 136–145)
SODIUM SERPL-SCNC: 137 MMOL/L (ref 136–145)
SP GR UR STRIP: 1.02 (ref 1–1.03)
STRESS TARGET HR: 125 BPM
STRESS TARGET HR: 125 BPM
T4 FREE SERPL-MCNC: 1.09 NG/DL (ref 0.93–1.7)
TIBC SERPL-MCNC: 398 MCG/DL (ref 298–536)
TRANSFERRIN SERPL-MCNC: 267 MG/DL (ref 200–360)
TRIGL SERPL-MCNC: 195 MG/DL (ref 0–150)
TROPONIN T SERPL-MCNC: <0.01 NG/ML (ref 0–0.03)
TROPONIN T SERPL-MCNC: <0.01 NG/ML (ref 0–0.03)
TSH SERPL DL<=0.05 MIU/L-ACNC: 1.98 UIU/ML (ref 0.27–4.2)
UROBILINOGEN UR QL STRIP: ABNORMAL
VIT B12 BLD-MCNC: 585 PG/ML (ref 211–946)
VLDLC SERPL-MCNC: 39 MG/DL (ref 5–40)
WBC # BLD AUTO: 11.34 10*3/MM3 (ref 3.4–10.8)
WBC # BLD AUTO: 7.18 10*3/MM3 (ref 3.4–10.8)
WBC # BLD AUTO: 7.38 10*3/MM3 (ref 3.4–10.8)
WBC # BLD AUTO: 8.17 10*3/MM3 (ref 3.4–10.8)
WBC # BLD AUTO: 8.42 10*3/MM3 (ref 3.4–10.8)
WBC # BLD AUTO: 8.94 10*3/MM3 (ref 3.4–10.8)
WHOLE BLOOD HOLD SPECIMEN: NORMAL
WHOLE BLOOD HOLD SPECIMEN: NORMAL

## 2020-01-01 PROCEDURE — 94799 UNLISTED PULMONARY SVC/PX: CPT

## 2020-01-01 PROCEDURE — 0DBP8ZZ EXCISION OF RECTUM, VIA NATURAL OR ARTIFICIAL OPENING ENDOSCOPIC: ICD-10-PCS | Performed by: INTERNAL MEDICINE

## 2020-01-01 PROCEDURE — 80170 ASSAY OF GENTAMICIN: CPT | Performed by: NURSE PRACTITIONER

## 2020-01-01 PROCEDURE — 0DBK8ZZ EXCISION OF ASCENDING COLON, VIA NATURAL OR ARTIFICIAL OPENING ENDOSCOPIC: ICD-10-PCS | Performed by: INTERNAL MEDICINE

## 2020-01-01 PROCEDURE — 63710000001 INSULIN DETEMIR PER 5 UNITS: Performed by: HOSPITALIST

## 2020-01-01 PROCEDURE — 81003 URINALYSIS AUTO W/O SCOPE: CPT | Performed by: STUDENT IN AN ORGANIZED HEALTH CARE EDUCATION/TRAINING PROGRAM

## 2020-01-01 PROCEDURE — 93308 TTE F-UP OR LMTD: CPT

## 2020-01-01 PROCEDURE — 93325 DOPPLER ECHO COLOR FLOW MAPG: CPT | Performed by: INTERNAL MEDICINE

## 2020-01-01 PROCEDURE — 93005 ELECTROCARDIOGRAM TRACING: CPT | Performed by: NURSE PRACTITIONER

## 2020-01-01 PROCEDURE — 25010000002 HYDROMORPHONE 1 MG/ML SOLUTION: Performed by: HOSPITALIST

## 2020-01-01 PROCEDURE — 25010000002 CEFTRIAXONE: Performed by: NURSE PRACTITIONER

## 2020-01-01 PROCEDURE — 25010000002 IOPAMIDOL 61 % SOLUTION: Performed by: STUDENT IN AN ORGANIZED HEALTH CARE EDUCATION/TRAINING PROGRAM

## 2020-01-01 PROCEDURE — G0378 HOSPITAL OBSERVATION PER HR: HCPCS

## 2020-01-01 PROCEDURE — 94760 N-INVAS EAR/PLS OXIMETRY 1: CPT

## 2020-01-01 PROCEDURE — 70450 CT HEAD/BRAIN W/O DYE: CPT

## 2020-01-01 PROCEDURE — 25010000002 KETOROLAC TROMETHAMINE PER 15 MG: Performed by: HOSPITALIST

## 2020-01-01 PROCEDURE — 87040 BLOOD CULTURE FOR BACTERIA: CPT | Performed by: NURSE PRACTITIONER

## 2020-01-01 PROCEDURE — P9612 CATHETERIZE FOR URINE SPEC: HCPCS

## 2020-01-01 PROCEDURE — 85025 COMPLETE CBC W/AUTO DIFF WBC: CPT | Performed by: HOSPITALIST

## 2020-01-01 PROCEDURE — 0DB68ZX EXCISION OF STOMACH, VIA NATURAL OR ARTIFICIAL OPENING ENDOSCOPIC, DIAGNOSTIC: ICD-10-PCS | Performed by: INTERNAL MEDICINE

## 2020-01-01 PROCEDURE — 99222 1ST HOSP IP/OBS MODERATE 55: CPT | Performed by: INTERNAL MEDICINE

## 2020-01-01 PROCEDURE — 83690 ASSAY OF LIPASE: CPT | Performed by: STUDENT IN AN ORGANIZED HEALTH CARE EDUCATION/TRAINING PROGRAM

## 2020-01-01 PROCEDURE — 97162 PT EVAL MOD COMPLEX 30 MIN: CPT

## 2020-01-01 PROCEDURE — 99222 1ST HOSP IP/OBS MODERATE 55: CPT | Performed by: NURSE PRACTITIONER

## 2020-01-01 PROCEDURE — 87636 SARSCOV2 & INF A&B AMP PRB: CPT | Performed by: STUDENT IN AN ORGANIZED HEALTH CARE EDUCATION/TRAINING PROGRAM

## 2020-01-01 PROCEDURE — 99214 OFFICE O/P EST MOD 30 MIN: CPT | Performed by: NURSE PRACTITIONER

## 2020-01-01 PROCEDURE — 84443 ASSAY THYROID STIM HORMONE: CPT

## 2020-01-01 PROCEDURE — 82962 GLUCOSE BLOOD TEST: CPT

## 2020-01-01 PROCEDURE — 93880 EXTRACRANIAL BILAT STUDY: CPT

## 2020-01-01 PROCEDURE — 25010000002 PROPOFOL 10 MG/ML EMULSION: Performed by: NURSE ANESTHETIST, CERTIFIED REGISTERED

## 2020-01-01 PROCEDURE — 0DBN8ZZ EXCISION OF SIGMOID COLON, VIA NATURAL OR ARTIFICIAL OPENING ENDOSCOPIC: ICD-10-PCS | Performed by: INTERNAL MEDICINE

## 2020-01-01 PROCEDURE — 25010000002 GENTAMICIN PER 80 MG: Performed by: NURSE PRACTITIONER

## 2020-01-01 PROCEDURE — 0DBL8ZZ EXCISION OF TRANSVERSE COLON, VIA NATURAL OR ARTIFICIAL OPENING ENDOSCOPIC: ICD-10-PCS | Performed by: INTERNAL MEDICINE

## 2020-01-01 PROCEDURE — 83605 ASSAY OF LACTIC ACID: CPT | Performed by: STUDENT IN AN ORGANIZED HEALTH CARE EDUCATION/TRAINING PROGRAM

## 2020-01-01 PROCEDURE — 0DB58ZX EXCISION OF ESOPHAGUS, VIA NATURAL OR ARTIFICIAL OPENING ENDOSCOPIC, DIAGNOSTIC: ICD-10-PCS | Performed by: INTERNAL MEDICINE

## 2020-01-01 PROCEDURE — 0DBM8ZZ EXCISION OF DESCENDING COLON, VIA NATURAL OR ARTIFICIAL OPENING ENDOSCOPIC: ICD-10-PCS | Performed by: INTERNAL MEDICINE

## 2020-01-01 PROCEDURE — 82043 UR ALBUMIN QUANTITATIVE: CPT

## 2020-01-01 PROCEDURE — 85025 COMPLETE CBC W/AUTO DIFF WBC: CPT | Performed by: STUDENT IN AN ORGANIZED HEALTH CARE EDUCATION/TRAINING PROGRAM

## 2020-01-01 PROCEDURE — 93010 ELECTROCARDIOGRAM REPORT: CPT | Performed by: INTERNAL MEDICINE

## 2020-01-01 PROCEDURE — 99223 1ST HOSP IP/OBS HIGH 75: CPT | Performed by: INTERNAL MEDICINE

## 2020-01-01 PROCEDURE — 25010000002 HYDROMORPHONE 1 MG/ML SOLUTION: Performed by: INTERNAL MEDICINE

## 2020-01-01 PROCEDURE — 80053 COMPREHEN METABOLIC PANEL: CPT

## 2020-01-01 PROCEDURE — 93325 DOPPLER ECHO COLOR FLOW MAPG: CPT

## 2020-01-01 PROCEDURE — 84466 ASSAY OF TRANSFERRIN: CPT

## 2020-01-01 PROCEDURE — 99285 EMERGENCY DEPT VISIT HI MDM: CPT

## 2020-01-01 PROCEDURE — 93308 TTE F-UP OR LMTD: CPT | Performed by: INTERNAL MEDICINE

## 2020-01-01 PROCEDURE — 93321 DOPPLER ECHO F-UP/LMTD STD: CPT

## 2020-01-01 PROCEDURE — 63710000001 INSULIN ASPART PER 5 UNITS: Performed by: HOSPITALIST

## 2020-01-01 PROCEDURE — 84484 ASSAY OF TROPONIN QUANT: CPT | Performed by: STUDENT IN AN ORGANIZED HEALTH CARE EDUCATION/TRAINING PROGRAM

## 2020-01-01 PROCEDURE — 85025 COMPLETE CBC W/AUTO DIFF WBC: CPT

## 2020-01-01 PROCEDURE — 94660 CPAP INITIATION&MGMT: CPT

## 2020-01-01 PROCEDURE — 82607 VITAMIN B-12: CPT

## 2020-01-01 PROCEDURE — 93005 ELECTROCARDIOGRAM TRACING: CPT | Performed by: INTERNAL MEDICINE

## 2020-01-01 PROCEDURE — 80048 BASIC METABOLIC PNL TOTAL CA: CPT | Performed by: HOSPITALIST

## 2020-01-01 PROCEDURE — 97535 SELF CARE MNGMENT TRAINING: CPT

## 2020-01-01 PROCEDURE — 83036 HEMOGLOBIN GLYCOSYLATED A1C: CPT

## 2020-01-01 PROCEDURE — 25010000002 AZITHROMYCIN PER 500 MG: Performed by: STUDENT IN AN ORGANIZED HEALTH CARE EDUCATION/TRAINING PROGRAM

## 2020-01-01 PROCEDURE — 25010000002 AZITHROMYCIN PER 500 MG: Performed by: HOSPITALIST

## 2020-01-01 PROCEDURE — 25010000002 ONDANSETRON PER 1 MG: Performed by: NURSE PRACTITIONER

## 2020-01-01 PROCEDURE — 87186 SC STD MICRODIL/AGAR DIL: CPT | Performed by: STUDENT IN AN ORGANIZED HEALTH CARE EDUCATION/TRAINING PROGRAM

## 2020-01-01 PROCEDURE — 74177 CT ABD & PELVIS W/CONTRAST: CPT

## 2020-01-01 PROCEDURE — 87635 SARS-COV-2 COVID-19 AMP PRB: CPT | Performed by: INTERNAL MEDICINE

## 2020-01-01 PROCEDURE — 99215 OFFICE O/P EST HI 40 MIN: CPT | Performed by: NURSE PRACTITIONER

## 2020-01-01 PROCEDURE — 25010000002 CEFTRIAXONE PER 250 MG: Performed by: NURSE PRACTITIONER

## 2020-01-01 PROCEDURE — 80053 COMPREHEN METABOLIC PANEL: CPT | Performed by: STUDENT IN AN ORGANIZED HEALTH CARE EDUCATION/TRAINING PROGRAM

## 2020-01-01 PROCEDURE — 88305 TISSUE EXAM BY PATHOLOGIST: CPT

## 2020-01-01 PROCEDURE — 80061 LIPID PANEL: CPT

## 2020-01-01 PROCEDURE — 93000 ELECTROCARDIOGRAM COMPLETE: CPT | Performed by: INTERNAL MEDICINE

## 2020-01-01 PROCEDURE — G0438 PPPS, INITIAL VISIT: HCPCS | Performed by: NURSE PRACTITIONER

## 2020-01-01 PROCEDURE — 25010000002 VANCOMYCIN 1 G RECONSTITUTED SOLUTION: Performed by: NURSE PRACTITIONER

## 2020-01-01 PROCEDURE — 63710000001 INSULIN DETEMIR PER 5 UNITS: Performed by: INTERNAL MEDICINE

## 2020-01-01 PROCEDURE — 71045 X-RAY EXAM CHEST 1 VIEW: CPT

## 2020-01-01 PROCEDURE — 97166 OT EVAL MOD COMPLEX 45 MIN: CPT

## 2020-01-01 PROCEDURE — 25010000002 CEFTRIAXONE: Performed by: STUDENT IN AN ORGANIZED HEALTH CARE EDUCATION/TRAINING PROGRAM

## 2020-01-01 PROCEDURE — 84439 ASSAY OF FREE THYROXINE: CPT

## 2020-01-01 PROCEDURE — 0DB98ZX EXCISION OF DUODENUM, VIA NATURAL OR ARTIFICIAL OPENING ENDOSCOPIC, DIAGNOSTIC: ICD-10-PCS | Performed by: INTERNAL MEDICINE

## 2020-01-01 PROCEDURE — 87040 BLOOD CULTURE FOR BACTERIA: CPT | Performed by: STUDENT IN AN ORGANIZED HEALTH CARE EDUCATION/TRAINING PROGRAM

## 2020-01-01 PROCEDURE — 97530 THERAPEUTIC ACTIVITIES: CPT

## 2020-01-01 PROCEDURE — 99441 PR PHYS/QHP TELEPHONE EVALUATION 5-10 MIN: CPT | Performed by: NURSE PRACTITIONER

## 2020-01-01 PROCEDURE — 93321 DOPPLER ECHO F-UP/LMTD STD: CPT | Performed by: INTERNAL MEDICINE

## 2020-01-01 PROCEDURE — 82570 ASSAY OF URINE CREATININE: CPT

## 2020-01-01 PROCEDURE — 36415 COLL VENOUS BLD VENIPUNCTURE: CPT

## 2020-01-01 PROCEDURE — 63710000001 INSULIN ASPART PER 5 UNITS: Performed by: INTERNAL MEDICINE

## 2020-01-01 PROCEDURE — 0DBN8ZX EXCISION OF SIGMOID COLON, VIA NATURAL OR ARTIFICIAL OPENING ENDOSCOPIC, DIAGNOSTIC: ICD-10-PCS | Performed by: INTERNAL MEDICINE

## 2020-01-01 PROCEDURE — 82728 ASSAY OF FERRITIN: CPT

## 2020-01-01 PROCEDURE — 87150 DNA/RNA AMPLIFIED PROBE: CPT | Performed by: STUDENT IN AN ORGANIZED HEALTH CARE EDUCATION/TRAINING PROGRAM

## 2020-01-01 PROCEDURE — 83880 ASSAY OF NATRIURETIC PEPTIDE: CPT | Performed by: STUDENT IN AN ORGANIZED HEALTH CARE EDUCATION/TRAINING PROGRAM

## 2020-01-01 PROCEDURE — 83540 ASSAY OF IRON: CPT

## 2020-01-01 RX ORDER — PROMETHAZINE HYDROCHLORIDE 25 MG/1
25 TABLET ORAL ONCE AS NEEDED
Status: DISCONTINUED | OUTPATIENT
Start: 2020-01-01 | End: 2020-01-01 | Stop reason: HOSPADM

## 2020-01-01 RX ORDER — ONDANSETRON 2 MG/ML
4 INJECTION INTRAMUSCULAR; INTRAVENOUS EVERY 6 HOURS PRN
Status: DISCONTINUED | OUTPATIENT
Start: 2020-01-01 | End: 2021-01-01 | Stop reason: HOSPADM

## 2020-01-01 RX ORDER — ALPRAZOLAM 0.25 MG/1
0.25 TABLET ORAL ONCE
Qty: 1 TABLET | Refills: 0 | Status: SHIPPED | OUTPATIENT
Start: 2020-01-01 | End: 2020-01-01

## 2020-01-01 RX ORDER — FERROUS SULFATE TAB EC 324 MG (65 MG FE EQUIVALENT) 324 (65 FE) MG
324 TABLET DELAYED RESPONSE ORAL 2 TIMES DAILY WITH MEALS
Status: DISCONTINUED | OUTPATIENT
Start: 2020-01-01 | End: 2021-01-01 | Stop reason: HOSPADM

## 2020-01-01 RX ORDER — BETAMETHASONE DIPROPIONATE 0.05 %
OINTMENT (GRAM) TOPICAL EVERY 12 HOURS SCHEDULED
Status: DISCONTINUED | OUTPATIENT
Start: 2020-01-01 | End: 2020-01-01

## 2020-01-01 RX ORDER — ALPRAZOLAM 0.25 MG/1
0.25 TABLET ORAL NIGHTLY PRN
Status: DISCONTINUED | OUTPATIENT
Start: 2020-01-01 | End: 2021-01-01 | Stop reason: HOSPADM

## 2020-01-01 RX ORDER — OXYBUTYNIN CHLORIDE 5 MG/1
5 TABLET, EXTENDED RELEASE ORAL DAILY
Status: DISCONTINUED | OUTPATIENT
Start: 2020-01-01 | End: 2021-01-01 | Stop reason: HOSPADM

## 2020-01-01 RX ORDER — BLOOD SUGAR DIAGNOSTIC
STRIP MISCELLANEOUS
Qty: 100 EACH | Refills: 11 | Status: SHIPPED | OUTPATIENT
Start: 2020-01-01 | End: 2021-01-01

## 2020-01-01 RX ORDER — TRAZODONE HYDROCHLORIDE 50 MG/1
TABLET ORAL
Qty: 90 TABLET | Refills: 1 | Status: ON HOLD | OUTPATIENT
Start: 2020-01-01 | End: 2021-01-01

## 2020-01-01 RX ORDER — INSULIN GLARGINE 100 [IU]/ML
50 INJECTION, SOLUTION SUBCUTANEOUS NIGHTLY
Qty: 15 ML | Refills: 5 | Status: SHIPPED | OUTPATIENT
Start: 2020-01-01 | End: 2021-01-01

## 2020-01-01 RX ORDER — BUDESONIDE AND FORMOTEROL FUMARATE DIHYDRATE 160; 4.5 UG/1; UG/1
2 AEROSOL RESPIRATORY (INHALATION)
Status: DISCONTINUED | OUTPATIENT
Start: 2020-01-01 | End: 2021-01-01 | Stop reason: HOSPADM

## 2020-01-01 RX ORDER — FLURBIPROFEN SODIUM 0.3 MG/ML
SOLUTION/ DROPS OPHTHALMIC
Qty: 100 EACH | Refills: 11 | Status: SHIPPED | OUTPATIENT
Start: 2020-01-01

## 2020-01-01 RX ORDER — ASPIRIN 81 MG/1
81 TABLET ORAL DAILY
Qty: 30 TABLET | Refills: 5 | Status: SHIPPED | OUTPATIENT
Start: 2020-01-01 | End: 2021-01-01 | Stop reason: HOSPADM

## 2020-01-01 RX ORDER — CEFUROXIME AXETIL 500 MG/1
500 TABLET ORAL 2 TIMES DAILY
Qty: 20 TABLET | Refills: 0 | Status: SHIPPED | OUTPATIENT
Start: 2020-01-01 | End: 2020-01-01

## 2020-01-01 RX ORDER — DEXTROSE MONOHYDRATE 25 G/50ML
25 INJECTION, SOLUTION INTRAVENOUS
Status: DISCONTINUED | OUTPATIENT
Start: 2020-01-01 | End: 2021-01-01 | Stop reason: HOSPADM

## 2020-01-01 RX ORDER — INSULIN GLARGINE 100 [IU]/ML
INJECTION, SOLUTION SUBCUTANEOUS
COMMUNITY
Start: 2020-01-01 | End: 2020-01-01 | Stop reason: SDUPTHER

## 2020-01-01 RX ORDER — HYDROCODONE BITARTRATE AND ACETAMINOPHEN 10; 325 MG/1; MG/1
1 TABLET ORAL EVERY 4 HOURS PRN
Status: DISCONTINUED | OUTPATIENT
Start: 2020-01-01 | End: 2021-01-01 | Stop reason: HOSPADM

## 2020-01-01 RX ORDER — PROMETHAZINE HYDROCHLORIDE 25 MG/1
25 SUPPOSITORY RECTAL ONCE AS NEEDED
Status: DISCONTINUED | OUTPATIENT
Start: 2020-01-01 | End: 2020-01-01 | Stop reason: HOSPADM

## 2020-01-01 RX ORDER — SODIUM CHLORIDE 0.9 % (FLUSH) 0.9 %
10 SYRINGE (ML) INJECTION EVERY 12 HOURS SCHEDULED
Status: DISCONTINUED | OUTPATIENT
Start: 2020-01-01 | End: 2021-01-01 | Stop reason: HOSPADM

## 2020-01-01 RX ORDER — MELATONIN
1000 DAILY
Status: DISCONTINUED | OUTPATIENT
Start: 2020-01-01 | End: 2021-01-01 | Stop reason: HOSPADM

## 2020-01-01 RX ORDER — ASPIRIN 81 MG/1
81 TABLET ORAL DAILY
Status: DISCONTINUED | OUTPATIENT
Start: 2020-01-01 | End: 2020-01-01

## 2020-01-01 RX ORDER — ALPRAZOLAM 0.25 MG/1
0.25 TABLET ORAL
COMMUNITY
Start: 2020-01-01 | End: 2021-01-01

## 2020-01-01 RX ORDER — BETAMETHASONE DIPROPIONATE 0.5 MG/G
CREAM TOPICAL EVERY 12 HOURS SCHEDULED
Status: DISCONTINUED | OUTPATIENT
Start: 2020-01-01 | End: 2021-01-01 | Stop reason: HOSPADM

## 2020-01-01 RX ORDER — FERROUS SULFATE 325(65) MG
325 TABLET ORAL 2 TIMES DAILY
Qty: 60 TABLET | Refills: 5 | Status: SHIPPED | OUTPATIENT
Start: 2020-01-01 | End: 2021-01-01

## 2020-01-01 RX ORDER — SODIUM CHLORIDE 0.9 % (FLUSH) 0.9 %
10 SYRINGE (ML) INJECTION AS NEEDED
Status: DISCONTINUED | OUTPATIENT
Start: 2020-01-01 | End: 2021-01-01 | Stop reason: HOSPADM

## 2020-01-01 RX ORDER — INSULIN GLARGINE 100 [IU]/ML
INJECTION, SOLUTION SUBCUTANEOUS
Qty: 15 ML | Refills: 5 | Status: SHIPPED | OUTPATIENT
Start: 2020-01-01 | End: 2020-01-01

## 2020-01-01 RX ORDER — OMEPRAZOLE 40 MG/1
CAPSULE, DELAYED RELEASE ORAL
Qty: 30 CAPSULE | Refills: 1 | Status: SHIPPED | OUTPATIENT
Start: 2020-01-01 | End: 2020-01-01

## 2020-01-01 RX ORDER — SODIUM CHLORIDE 9 MG/ML
20 INJECTION, SOLUTION INTRAVENOUS ONCE
Status: COMPLETED | OUTPATIENT
Start: 2020-01-01 | End: 2020-01-01

## 2020-01-01 RX ORDER — ONDANSETRON 2 MG/ML
4 INJECTION INTRAMUSCULAR; INTRAVENOUS ONCE AS NEEDED
Status: DISCONTINUED | OUTPATIENT
Start: 2020-01-01 | End: 2020-01-01 | Stop reason: HOSPADM

## 2020-01-01 RX ORDER — KETOROLAC TROMETHAMINE 15 MG/ML
15 INJECTION, SOLUTION INTRAMUSCULAR; INTRAVENOUS EVERY 6 HOURS PRN
Status: DISPENSED | OUTPATIENT
Start: 2020-01-01 | End: 2021-01-01

## 2020-01-01 RX ORDER — LISINOPRIL 5 MG/1
TABLET ORAL
Qty: 90 TABLET | Refills: 0 | Status: SHIPPED | OUTPATIENT
Start: 2020-01-01 | End: 2021-01-01

## 2020-01-01 RX ORDER — METHOCARBAMOL 750 MG/1
750 TABLET, FILM COATED ORAL EVERY 6 HOURS PRN
Status: DISCONTINUED | OUTPATIENT
Start: 2020-01-01 | End: 2021-01-01 | Stop reason: HOSPADM

## 2020-01-01 RX ORDER — DEXTROSE AND SODIUM CHLORIDE 5; .45 G/100ML; G/100ML
INJECTION, SOLUTION INTRAVENOUS CONTINUOUS PRN
Status: DISCONTINUED | OUTPATIENT
Start: 2020-01-01 | End: 2020-01-01 | Stop reason: SURG

## 2020-01-01 RX ORDER — LISINOPRIL 2.5 MG/1
2.5 TABLET ORAL DAILY
Status: DISCONTINUED | OUTPATIENT
Start: 2020-01-01 | End: 2021-01-01 | Stop reason: HOSPADM

## 2020-01-01 RX ORDER — PROMETHAZINE HYDROCHLORIDE 25 MG/1
25 SUPPOSITORY RECTAL ONCE AS NEEDED
Status: DISCONTINUED | OUTPATIENT
Start: 2020-01-01 | End: 2020-01-01 | Stop reason: SDUPTHER

## 2020-01-01 RX ORDER — ALBUTEROL SULFATE 90 UG/1
2 AEROSOL, METERED RESPIRATORY (INHALATION) EVERY 6 HOURS PRN
Qty: 18 G | Refills: 5 | Status: SHIPPED | OUTPATIENT
Start: 2020-01-01

## 2020-01-01 RX ORDER — PANTOPRAZOLE SODIUM 40 MG/1
40 TABLET, DELAYED RELEASE ORAL EVERY MORNING
Status: DISCONTINUED | OUTPATIENT
Start: 2020-01-01 | End: 2021-01-01 | Stop reason: HOSPADM

## 2020-01-01 RX ORDER — LOVASTATIN 40 MG/1
40 TABLET ORAL NIGHTLY
Qty: 90 TABLET | Refills: 2 | Status: SHIPPED | OUTPATIENT
Start: 2020-01-01 | End: 2021-01-01 | Stop reason: HOSPADM

## 2020-01-01 RX ORDER — LOVASTATIN 40 MG/1
40 TABLET ORAL NIGHTLY
Qty: 90 TABLET | Refills: 0 | Status: SHIPPED | OUTPATIENT
Start: 2020-01-01 | End: 2020-01-01 | Stop reason: SDUPTHER

## 2020-01-01 RX ORDER — ALBUTEROL SULFATE 2.5 MG/3ML
2.5 SOLUTION RESPIRATORY (INHALATION) EVERY 6 HOURS PRN
Status: DISCONTINUED | OUTPATIENT
Start: 2020-01-01 | End: 2021-01-01 | Stop reason: HOSPADM

## 2020-01-01 RX ORDER — VENLAFAXINE HYDROCHLORIDE 75 MG/1
75 CAPSULE, EXTENDED RELEASE ORAL DAILY
Status: DISCONTINUED | OUTPATIENT
Start: 2020-01-01 | End: 2021-01-01 | Stop reason: HOSPADM

## 2020-01-01 RX ORDER — PROMETHAZINE HYDROCHLORIDE 25 MG/1
25 TABLET ORAL ONCE AS NEEDED
Status: DISCONTINUED | OUTPATIENT
Start: 2020-01-01 | End: 2020-01-01 | Stop reason: SDUPTHER

## 2020-01-01 RX ORDER — PROPOFOL 10 MG/ML
VIAL (ML) INTRAVENOUS AS NEEDED
Status: DISCONTINUED | OUTPATIENT
Start: 2020-01-01 | End: 2020-01-01 | Stop reason: SURG

## 2020-01-01 RX ORDER — LIDOCAINE HYDROCHLORIDE 20 MG/ML
INJECTION, SOLUTION EPIDURAL; INFILTRATION; INTRACAUDAL; PERINEURAL AS NEEDED
Status: DISCONTINUED | OUTPATIENT
Start: 2020-01-01 | End: 2020-01-01 | Stop reason: SURG

## 2020-01-01 RX ORDER — INSULIN GLARGINE 100 [IU]/ML
INJECTION, SOLUTION SUBCUTANEOUS
Qty: 15 ML | Refills: 0 | Status: SHIPPED | OUTPATIENT
Start: 2020-01-01 | End: 2020-01-01

## 2020-01-01 RX ORDER — TRAZODONE HYDROCHLORIDE 50 MG/1
50 TABLET ORAL NIGHTLY
Status: DISCONTINUED | OUTPATIENT
Start: 2020-01-01 | End: 2021-01-01 | Stop reason: HOSPADM

## 2020-01-01 RX ORDER — ATORVASTATIN CALCIUM 10 MG/1
10 TABLET, FILM COATED ORAL DAILY
Status: DISCONTINUED | OUTPATIENT
Start: 2020-01-01 | End: 2021-01-01 | Stop reason: HOSPADM

## 2020-01-01 RX ORDER — CHLORAL HYDRATE 500 MG
1000 CAPSULE ORAL 2 TIMES DAILY WITH MEALS
Qty: 60 CAPSULE | Refills: 5 | Status: SHIPPED | OUTPATIENT
Start: 2020-01-01 | End: 2021-01-01

## 2020-01-01 RX ORDER — ACETAMINOPHEN 500 MG
1000 TABLET ORAL ONCE
Status: COMPLETED | OUTPATIENT
Start: 2020-01-01 | End: 2020-01-01

## 2020-01-01 RX ORDER — NICOTINE POLACRILEX 4 MG
15 LOZENGE BUCCAL
Status: DISCONTINUED | OUTPATIENT
Start: 2020-01-01 | End: 2021-01-01 | Stop reason: HOSPADM

## 2020-01-01 RX ADMIN — METOPROLOL TARTRATE 25 MG: 25 TABLET, FILM COATED ORAL at 19:41

## 2020-01-01 RX ADMIN — FERROUS SULFATE TAB EC 324 MG (65 MG FE EQUIVALENT) 324 MG: 324 (65 FE) TABLET DELAYED RESPONSE at 08:06

## 2020-01-01 RX ADMIN — SODIUM CHLORIDE, PRESERVATIVE FREE 10 ML: 5 INJECTION INTRAVENOUS at 20:51

## 2020-01-01 RX ADMIN — APIXABAN 5 MG: 5 TABLET, FILM COATED ORAL at 01:22

## 2020-01-01 RX ADMIN — VENLAFAXINE HYDROCHLORIDE 75 MG: 75 CAPSULE, EXTENDED RELEASE ORAL at 09:02

## 2020-01-01 RX ADMIN — BETAMETHASONE DIPROPIONATE: 0.5 CREAM TOPICAL at 21:49

## 2020-01-01 RX ADMIN — Medication 1000 UNITS: at 08:05

## 2020-01-01 RX ADMIN — CEFTRIAXONE 2 G: 2 INJECTION, POWDER, FOR SOLUTION INTRAMUSCULAR; INTRAVENOUS at 00:23

## 2020-01-01 RX ADMIN — VENLAFAXINE HYDROCHLORIDE 75 MG: 75 CAPSULE, EXTENDED RELEASE ORAL at 08:14

## 2020-01-01 RX ADMIN — PANTOPRAZOLE SODIUM 40 MG: 40 TABLET, DELAYED RELEASE ORAL at 08:18

## 2020-01-01 RX ADMIN — HYDROCODONE BITARTRATE AND ACETAMINOPHEN 1 TABLET: 10; 325 TABLET ORAL at 21:38

## 2020-01-01 RX ADMIN — POLYETHYLENE GLYCOL-3350 AND ELECTROLYTES 4000 ML: 236; 6.74; 5.86; 2.97; 22.74 POWDER, FOR SOLUTION ORAL at 15:23

## 2020-01-01 RX ADMIN — CEFTRIAXONE 2 G: 2 INJECTION, POWDER, FOR SOLUTION INTRAMUSCULAR; INTRAVENOUS at 20:54

## 2020-01-01 RX ADMIN — ACETAMINOPHEN 1000 MG: 500 TABLET ORAL at 19:07

## 2020-01-01 RX ADMIN — HYDROMORPHONE HYDROCHLORIDE 0.5 MG: 1 INJECTION, SOLUTION INTRAMUSCULAR; INTRAVENOUS; SUBCUTANEOUS at 08:05

## 2020-01-01 RX ADMIN — AZITHROMYCIN MONOHYDRATE 500 MG: 500 INJECTION, POWDER, LYOPHILIZED, FOR SOLUTION INTRAVENOUS at 21:35

## 2020-01-01 RX ADMIN — LISINOPRIL 2.5 MG: 2.5 TABLET ORAL at 09:02

## 2020-01-01 RX ADMIN — FERROUS SULFATE TAB EC 324 MG (65 MG FE EQUIVALENT) 324 MG: 324 (65 FE) TABLET DELAYED RESPONSE at 08:14

## 2020-01-01 RX ADMIN — CEFTRIAXONE 2 G: 2 INJECTION, POWDER, FOR SOLUTION INTRAMUSCULAR; INTRAVENOUS at 00:55

## 2020-01-01 RX ADMIN — VENLAFAXINE HYDROCHLORIDE 75 MG: 75 CAPSULE, EXTENDED RELEASE ORAL at 09:26

## 2020-01-01 RX ADMIN — INSULIN DETEMIR 30 UNITS: 100 INJECTION, SOLUTION SUBCUTANEOUS at 20:23

## 2020-01-01 RX ADMIN — CEFTRIAXONE 2 G: 2 INJECTION, POWDER, FOR SOLUTION INTRAMUSCULAR; INTRAVENOUS at 00:00

## 2020-01-01 RX ADMIN — INSULIN ASPART 2 UNITS: 100 INJECTION, SOLUTION INTRAVENOUS; SUBCUTANEOUS at 17:16

## 2020-01-01 RX ADMIN — SODIUM CHLORIDE, PRESERVATIVE FREE 10 ML: 5 INJECTION INTRAVENOUS at 09:03

## 2020-01-01 RX ADMIN — VANCOMYCIN HYDROCHLORIDE 1000 MG: 1 INJECTION, POWDER, LYOPHILIZED, FOR SOLUTION INTRAVENOUS at 13:23

## 2020-01-01 RX ADMIN — ATORVASTATIN CALCIUM 10 MG: 10 TABLET, FILM COATED ORAL at 08:06

## 2020-01-01 RX ADMIN — METOPROLOL TARTRATE 25 MG: 25 TABLET, FILM COATED ORAL at 08:14

## 2020-01-01 RX ADMIN — OXYBUTYNIN CHLORIDE 5 MG: 5 TABLET, EXTENDED RELEASE ORAL at 08:14

## 2020-01-01 RX ADMIN — HYDROCODONE BITARTRATE AND ACETAMINOPHEN 1 TABLET: 10; 325 TABLET ORAL at 07:47

## 2020-01-01 RX ADMIN — SODIUM CHLORIDE, PRESERVATIVE FREE 10 ML: 5 INJECTION INTRAVENOUS at 11:40

## 2020-01-01 RX ADMIN — APIXABAN 5 MG: 5 TABLET, FILM COATED ORAL at 08:12

## 2020-01-01 RX ADMIN — HYDROCODONE BITARTRATE AND ACETAMINOPHEN 1 TABLET: 10; 325 TABLET ORAL at 16:40

## 2020-01-01 RX ADMIN — FERROUS SULFATE TAB EC 324 MG (65 MG FE EQUIVALENT) 324 MG: 324 (65 FE) TABLET DELAYED RESPONSE at 17:15

## 2020-01-01 RX ADMIN — SODIUM CHLORIDE, PRESERVATIVE FREE 10 ML: 5 INJECTION INTRAVENOUS at 08:15

## 2020-01-01 RX ADMIN — METOPROLOL TARTRATE 25 MG: 25 TABLET, FILM COATED ORAL at 08:08

## 2020-01-01 RX ADMIN — ATORVASTATIN CALCIUM 10 MG: 10 TABLET, FILM COATED ORAL at 09:26

## 2020-01-01 RX ADMIN — PROPOFOL 50 MG: 10 INJECTION, EMULSION INTRAVENOUS at 11:28

## 2020-01-01 RX ADMIN — INSULIN DETEMIR 30 UNITS: 100 INJECTION, SOLUTION SUBCUTANEOUS at 21:14

## 2020-01-01 RX ADMIN — METHOCARBAMOL 750 MG: 750 TABLET, FILM COATED ORAL at 21:48

## 2020-01-01 RX ADMIN — LISINOPRIL 2.5 MG: 2.5 TABLET ORAL at 08:06

## 2020-01-01 RX ADMIN — TRAZODONE HYDROCHLORIDE 50 MG: 50 TABLET ORAL at 20:44

## 2020-01-01 RX ADMIN — LISINOPRIL 2.5 MG: 2.5 TABLET ORAL at 09:26

## 2020-01-01 RX ADMIN — GENTAMICIN SULFATE 220 MG: 40 INJECTION, SOLUTION INTRAMUSCULAR; INTRAVENOUS at 14:18

## 2020-01-01 RX ADMIN — PROPOFOL 100 MG: 10 INJECTION, EMULSION INTRAVENOUS at 11:08

## 2020-01-01 RX ADMIN — INSULIN DETEMIR 30 UNITS: 100 INJECTION, SOLUTION SUBCUTANEOUS at 21:38

## 2020-01-01 RX ADMIN — HYDROCODONE BITARTRATE AND ACETAMINOPHEN 1 TABLET: 10; 325 TABLET ORAL at 16:37

## 2020-01-01 RX ADMIN — METOPROLOL TARTRATE 25 MG: 25 TABLET, FILM COATED ORAL at 01:27

## 2020-01-01 RX ADMIN — Medication 1000 UNITS: at 09:02

## 2020-01-01 RX ADMIN — SODIUM CHLORIDE 20 ML/HR: 9 INJECTION, SOLUTION INTRAVENOUS at 10:05

## 2020-01-01 RX ADMIN — HYDROCODONE BITARTRATE AND ACETAMINOPHEN 1 TABLET: 10; 325 TABLET ORAL at 04:54

## 2020-01-01 RX ADMIN — HYDROCODONE BITARTRATE AND ACETAMINOPHEN 1 TABLET: 10; 325 TABLET ORAL at 11:08

## 2020-01-01 RX ADMIN — SODIUM CHLORIDE, PRESERVATIVE FREE 10 ML: 5 INJECTION INTRAVENOUS at 08:05

## 2020-01-01 RX ADMIN — METOPROLOL TARTRATE 25 MG: 25 TABLET, FILM COATED ORAL at 20:44

## 2020-01-01 RX ADMIN — APIXABAN 5 MG: 5 TABLET, FILM COATED ORAL at 09:26

## 2020-01-01 RX ADMIN — BUDESONIDE AND FORMOTEROL FUMARATE DIHYDRATE 2 PUFF: 160; 4.5 AEROSOL RESPIRATORY (INHALATION) at 19:13

## 2020-01-01 RX ADMIN — VENLAFAXINE HYDROCHLORIDE 75 MG: 75 CAPSULE, EXTENDED RELEASE ORAL at 08:07

## 2020-01-01 RX ADMIN — TRAZODONE HYDROCHLORIDE 50 MG: 50 TABLET ORAL at 19:41

## 2020-01-01 RX ADMIN — SODIUM CHLORIDE, PRESERVATIVE FREE 10 ML: 5 INJECTION INTRAVENOUS at 19:42

## 2020-01-01 RX ADMIN — BUDESONIDE AND FORMOTEROL FUMARATE DIHYDRATE 2 PUFF: 160; 4.5 AEROSOL RESPIRATORY (INHALATION) at 20:25

## 2020-01-01 RX ADMIN — BETAMETHASONE DIPROPIONATE: 0.5 CREAM TOPICAL at 09:30

## 2020-01-01 RX ADMIN — BETAMETHASONE DIPROPIONATE: 0.5 CREAM TOPICAL at 20:52

## 2020-01-01 RX ADMIN — OXYBUTYNIN CHLORIDE 5 MG: 5 TABLET, EXTENDED RELEASE ORAL at 11:39

## 2020-01-01 RX ADMIN — BETAMETHASONE DIPROPIONATE: 0.5 CREAM TOPICAL at 10:05

## 2020-01-01 RX ADMIN — BUDESONIDE AND FORMOTEROL FUMARATE DIHYDRATE 2 PUFF: 160; 4.5 AEROSOL RESPIRATORY (INHALATION) at 21:36

## 2020-01-01 RX ADMIN — APIXABAN 5 MG: 5 TABLET, FILM COATED ORAL at 20:44

## 2020-01-01 RX ADMIN — PROPOFOL 50 MG: 10 INJECTION, EMULSION INTRAVENOUS at 11:33

## 2020-01-01 RX ADMIN — SODIUM CHLORIDE, POTASSIUM CHLORIDE, SODIUM LACTATE AND CALCIUM CHLORIDE 1000 ML: 600; 310; 30; 20 INJECTION, SOLUTION INTRAVENOUS at 18:18

## 2020-01-01 RX ADMIN — PROPOFOL 50 MG: 10 INJECTION, EMULSION INTRAVENOUS at 11:20

## 2020-01-01 RX ADMIN — TRAZODONE HYDROCHLORIDE 50 MG: 50 TABLET ORAL at 21:14

## 2020-01-01 RX ADMIN — HYDROMORPHONE HYDROCHLORIDE 0.5 MG: 1 INJECTION, SOLUTION INTRAMUSCULAR; INTRAVENOUS; SUBCUTANEOUS at 15:32

## 2020-01-01 RX ADMIN — ONDANSETRON 4 MG: 2 INJECTION INTRAMUSCULAR; INTRAVENOUS at 18:38

## 2020-01-01 RX ADMIN — BUDESONIDE AND FORMOTEROL FUMARATE DIHYDRATE 2 PUFF: 160; 4.5 AEROSOL RESPIRATORY (INHALATION) at 19:09

## 2020-01-01 RX ADMIN — FERROUS SULFATE TAB EC 324 MG (65 MG FE EQUIVALENT) 324 MG: 324 (65 FE) TABLET DELAYED RESPONSE at 17:56

## 2020-01-01 RX ADMIN — INSULIN ASPART 2 UNITS: 100 INJECTION, SOLUTION INTRAVENOUS; SUBCUTANEOUS at 11:08

## 2020-01-01 RX ADMIN — INSULIN ASPART 2 UNITS: 100 INJECTION, SOLUTION INTRAVENOUS; SUBCUTANEOUS at 17:27

## 2020-01-01 RX ADMIN — HYDROMORPHONE HYDROCHLORIDE 0.5 MG: 1 INJECTION, SOLUTION INTRAMUSCULAR; INTRAVENOUS; SUBCUTANEOUS at 09:53

## 2020-01-01 RX ADMIN — HYDROMORPHONE HYDROCHLORIDE 0.5 MG: 1 INJECTION, SOLUTION INTRAMUSCULAR; INTRAVENOUS; SUBCUTANEOUS at 19:41

## 2020-01-01 RX ADMIN — FERROUS SULFATE TAB EC 324 MG (65 MG FE EQUIVALENT) 324 MG: 324 (65 FE) TABLET DELAYED RESPONSE at 09:03

## 2020-01-01 RX ADMIN — BUDESONIDE AND FORMOTEROL FUMARATE DIHYDRATE 2 PUFF: 160; 4.5 AEROSOL RESPIRATORY (INHALATION) at 07:55

## 2020-01-01 RX ADMIN — ALPRAZOLAM 0.25 MG: 0.25 TABLET ORAL at 20:44

## 2020-01-01 RX ADMIN — HYDROCODONE BITARTRATE AND ACETAMINOPHEN 1 TABLET: 10; 325 TABLET ORAL at 13:23

## 2020-01-01 RX ADMIN — CEFTRIAXONE 2 G: 2 INJECTION, POWDER, FOR SOLUTION INTRAMUSCULAR; INTRAVENOUS at 23:57

## 2020-01-01 RX ADMIN — HYDROCODONE BITARTRATE AND ACETAMINOPHEN 1 TABLET: 10; 325 TABLET ORAL at 18:17

## 2020-01-01 RX ADMIN — CEFTRIAXONE 2 G: 2 INJECTION, POWDER, FOR SOLUTION INTRAMUSCULAR; INTRAVENOUS at 11:58

## 2020-01-01 RX ADMIN — AZITHROMYCIN MONOHYDRATE 500 MG: 500 INJECTION, POWDER, LYOPHILIZED, FOR SOLUTION INTRAVENOUS at 21:29

## 2020-01-01 RX ADMIN — SODIUM CHLORIDE, PRESERVATIVE FREE 10 ML: 5 INJECTION INTRAVENOUS at 09:23

## 2020-01-01 RX ADMIN — FERROUS SULFATE TAB EC 324 MG (65 MG FE EQUIVALENT) 324 MG: 324 (65 FE) TABLET DELAYED RESPONSE at 17:27

## 2020-01-01 RX ADMIN — INSULIN DETEMIR 30 UNITS: 100 INJECTION, SOLUTION SUBCUTANEOUS at 20:45

## 2020-01-01 RX ADMIN — ATORVASTATIN CALCIUM 10 MG: 10 TABLET, FILM COATED ORAL at 08:14

## 2020-01-01 RX ADMIN — METOPROLOL TARTRATE 25 MG: 25 TABLET, FILM COATED ORAL at 09:02

## 2020-01-01 RX ADMIN — PANTOPRAZOLE SODIUM 40 MG: 40 TABLET, DELAYED RELEASE ORAL at 06:25

## 2020-01-01 RX ADMIN — BETAMETHASONE DIPROPIONATE: 0.5 CREAM TOPICAL at 09:02

## 2020-01-01 RX ADMIN — METOPROLOL TARTRATE 25 MG: 25 TABLET, FILM COATED ORAL at 21:48

## 2020-01-01 RX ADMIN — HYDROCODONE BITARTRATE AND ACETAMINOPHEN 1 TABLET: 10; 325 TABLET ORAL at 20:44

## 2020-01-01 RX ADMIN — HYDROCODONE BITARTRATE AND ACETAMINOPHEN 1 TABLET: 10; 325 TABLET ORAL at 13:27

## 2020-01-01 RX ADMIN — HYDROMORPHONE HYDROCHLORIDE 0.5 MG: 1 INJECTION, SOLUTION INTRAMUSCULAR; INTRAVENOUS; SUBCUTANEOUS at 20:23

## 2020-01-01 RX ADMIN — INSULIN ASPART 2 UNITS: 100 INJECTION, SOLUTION INTRAVENOUS; SUBCUTANEOUS at 18:17

## 2020-01-01 RX ADMIN — TRAZODONE HYDROCHLORIDE 50 MG: 50 TABLET ORAL at 21:48

## 2020-01-01 RX ADMIN — INSULIN ASPART 2 UNITS: 100 INJECTION, SOLUTION INTRAVENOUS; SUBCUTANEOUS at 12:48

## 2020-01-01 RX ADMIN — KETOROLAC TROMETHAMINE 15 MG: 15 INJECTION, SOLUTION INTRAMUSCULAR; INTRAVENOUS at 01:20

## 2020-01-01 RX ADMIN — APIXABAN 5 MG: 5 TABLET, FILM COATED ORAL at 09:02

## 2020-01-01 RX ADMIN — HYDROMORPHONE HYDROCHLORIDE 0.5 MG: 1 INJECTION, SOLUTION INTRAMUSCULAR; INTRAVENOUS; SUBCUTANEOUS at 20:51

## 2020-01-01 RX ADMIN — ALPRAZOLAM 0.25 MG: 0.25 TABLET ORAL at 21:47

## 2020-01-01 RX ADMIN — Medication 1000 UNITS: at 08:14

## 2020-01-01 RX ADMIN — KETOROLAC TROMETHAMINE 15 MG: 15 INJECTION, SOLUTION INTRAMUSCULAR; INTRAVENOUS at 11:12

## 2020-01-01 RX ADMIN — TRAZODONE HYDROCHLORIDE 50 MG: 50 TABLET ORAL at 01:22

## 2020-01-01 RX ADMIN — BETAMETHASONE DIPROPIONATE: 0.5 CREAM TOPICAL at 08:23

## 2020-01-01 RX ADMIN — AZITHROMYCIN MONOHYDRATE 500 MG: 500 INJECTION, POWDER, LYOPHILIZED, FOR SOLUTION INTRAVENOUS at 21:47

## 2020-01-01 RX ADMIN — ASPIRIN 81 MG: 81 TABLET, COATED ORAL at 09:03

## 2020-01-01 RX ADMIN — AZITHROMYCIN MONOHYDRATE 500 MG: 500 INJECTION, POWDER, LYOPHILIZED, FOR SOLUTION INTRAVENOUS at 21:14

## 2020-01-01 RX ADMIN — PANTOPRAZOLE SODIUM 40 MG: 40 TABLET, DELAYED RELEASE ORAL at 05:40

## 2020-01-01 RX ADMIN — LISINOPRIL 2.5 MG: 2.5 TABLET ORAL at 08:14

## 2020-01-01 RX ADMIN — IOPAMIDOL 90 ML: 612 INJECTION, SOLUTION INTRAVENOUS at 19:38

## 2020-01-01 RX ADMIN — BETAMETHASONE DIPROPIONATE: 0.5 CREAM TOPICAL at 20:24

## 2020-01-01 RX ADMIN — AZITHROMYCIN MONOHYDRATE 500 MG: 500 INJECTION, POWDER, LYOPHILIZED, FOR SOLUTION INTRAVENOUS at 20:45

## 2020-01-01 RX ADMIN — KETOROLAC TROMETHAMINE 15 MG: 15 INJECTION, SOLUTION INTRAMUSCULAR; INTRAVENOUS at 17:15

## 2020-01-01 RX ADMIN — BUDESONIDE AND FORMOTEROL FUMARATE DIHYDRATE 2 PUFF: 160; 4.5 AEROSOL RESPIRATORY (INHALATION) at 07:47

## 2020-01-01 RX ADMIN — DEXTROSE AND SODIUM CHLORIDE: 5; 450 INJECTION, SOLUTION INTRAVENOUS at 10:55

## 2020-01-01 RX ADMIN — HYDROMORPHONE HYDROCHLORIDE 0.5 MG: 1 INJECTION, SOLUTION INTRAMUSCULAR; INTRAVENOUS; SUBCUTANEOUS at 18:25

## 2020-01-01 RX ADMIN — CEFTRIAXONE 2 G: 2 INJECTION, POWDER, FOR SOLUTION INTRAMUSCULAR; INTRAVENOUS at 12:55

## 2020-01-01 RX ADMIN — Medication 1000 UNITS: at 09:26

## 2020-01-01 RX ADMIN — HYDROMORPHONE HYDROCHLORIDE 0.5 MG: 1 INJECTION, SOLUTION INTRAMUSCULAR; INTRAVENOUS; SUBCUTANEOUS at 04:12

## 2020-01-01 RX ADMIN — METOPROLOL TARTRATE 25 MG: 25 TABLET, FILM COATED ORAL at 20:23

## 2020-01-01 RX ADMIN — FERROUS SULFATE TAB EC 324 MG (65 MG FE EQUIVALENT) 324 MG: 324 (65 FE) TABLET DELAYED RESPONSE at 07:44

## 2020-01-01 RX ADMIN — SODIUM CHLORIDE, PRESERVATIVE FREE 10 ML: 5 INJECTION INTRAVENOUS at 21:14

## 2020-01-01 RX ADMIN — INSULIN DETEMIR 30 UNITS: 100 INJECTION, SOLUTION SUBCUTANEOUS at 01:40

## 2020-01-01 RX ADMIN — OXYBUTYNIN CHLORIDE 5 MG: 5 TABLET, EXTENDED RELEASE ORAL at 08:06

## 2020-01-01 RX ADMIN — HYDROCODONE BITARTRATE AND ACETAMINOPHEN 1 TABLET: 10; 325 TABLET ORAL at 13:58

## 2020-01-01 RX ADMIN — PANTOPRAZOLE SODIUM 40 MG: 40 TABLET, DELAYED RELEASE ORAL at 07:44

## 2020-01-01 RX ADMIN — VANCOMYCIN HYDROCHLORIDE 1000 MG: 1 INJECTION, POWDER, LYOPHILIZED, FOR SOLUTION INTRAVENOUS at 00:34

## 2020-01-01 RX ADMIN — METOPROLOL TARTRATE 25 MG: 25 TABLET, FILM COATED ORAL at 21:14

## 2020-01-01 RX ADMIN — FERROUS SULFATE TAB EC 324 MG (65 MG FE EQUIVALENT) 324 MG: 324 (65 FE) TABLET DELAYED RESPONSE at 18:17

## 2020-01-01 RX ADMIN — ALPRAZOLAM 0.25 MG: 0.25 TABLET ORAL at 21:13

## 2020-01-01 RX ADMIN — BETAMETHASONE DIPROPIONATE: 0.5 OINTMENT TOPICAL at 01:37

## 2020-01-01 RX ADMIN — Medication: at 18:45

## 2020-01-01 RX ADMIN — SODIUM CHLORIDE, PRESERVATIVE FREE 10 ML: 5 INJECTION INTRAVENOUS at 21:48

## 2020-01-01 RX ADMIN — INSULIN ASPART 2 UNITS: 100 INJECTION, SOLUTION INTRAVENOUS; SUBCUTANEOUS at 07:56

## 2020-01-01 RX ADMIN — HYDROMORPHONE HYDROCHLORIDE 0.5 MG: 1 INJECTION, SOLUTION INTRAMUSCULAR; INTRAVENOUS; SUBCUTANEOUS at 04:23

## 2020-01-01 RX ADMIN — TRAZODONE HYDROCHLORIDE 50 MG: 50 TABLET ORAL at 20:23

## 2020-01-01 RX ADMIN — INSULIN ASPART 2 UNITS: 100 INJECTION, SOLUTION INTRAVENOUS; SUBCUTANEOUS at 14:14

## 2020-01-01 RX ADMIN — BUDESONIDE AND FORMOTEROL FUMARATE DIHYDRATE 2 PUFF: 160; 4.5 AEROSOL RESPIRATORY (INHALATION) at 07:10

## 2020-01-01 RX ADMIN — PANTOPRAZOLE SODIUM 40 MG: 40 TABLET, DELAYED RELEASE ORAL at 06:24

## 2020-01-01 RX ADMIN — OXYBUTYNIN CHLORIDE 5 MG: 5 TABLET, EXTENDED RELEASE ORAL at 09:09

## 2020-01-01 RX ADMIN — LIDOCAINE HYDROCHLORIDE 50 MG: 20 INJECTION, SOLUTION EPIDURAL; INFILTRATION; INTRACAUDAL; PERINEURAL at 11:08

## 2020-01-01 RX ADMIN — APIXABAN 5 MG: 5 TABLET, FILM COATED ORAL at 19:41

## 2020-01-01 RX ADMIN — BUDESONIDE AND FORMOTEROL FUMARATE DIHYDRATE 2 PUFF: 160; 4.5 AEROSOL RESPIRATORY (INHALATION) at 07:12

## 2020-01-01 RX ADMIN — METOPROLOL TARTRATE 25 MG: 25 TABLET, FILM COATED ORAL at 09:26

## 2020-01-01 RX ADMIN — ATORVASTATIN CALCIUM 10 MG: 10 TABLET, FILM COATED ORAL at 09:03

## 2020-01-01 RX ADMIN — SODIUM CHLORIDE, PRESERVATIVE FREE 10 ML: 5 INJECTION INTRAVENOUS at 01:27

## 2020-01-10 RX ORDER — VENLAFAXINE HYDROCHLORIDE 75 MG/1
CAPSULE, EXTENDED RELEASE ORAL
Qty: 90 CAPSULE | Refills: 3 | Status: SHIPPED | OUTPATIENT
Start: 2020-01-10 | End: 2021-01-01

## 2020-01-10 RX ORDER — TRAZODONE HYDROCHLORIDE 50 MG/1
50 TABLET ORAL NIGHTLY
Qty: 90 TABLET | Refills: 1 | Status: SHIPPED | OUTPATIENT
Start: 2020-01-10 | End: 2020-01-01

## 2020-02-06 RX ORDER — OMEPRAZOLE 40 MG/1
CAPSULE, DELAYED RELEASE ORAL
Qty: 30 CAPSULE | Refills: 1 | Status: SHIPPED | OUTPATIENT
Start: 2020-02-06 | End: 2020-01-01

## 2020-02-10 RX ORDER — INSULIN GLARGINE 100 [IU]/ML
INJECTION, SOLUTION SUBCUTANEOUS
Qty: 15 ML | Refills: 0 | Status: SHIPPED | OUTPATIENT
Start: 2020-02-10 | End: 2020-03-20

## 2020-03-20 RX ORDER — INSULIN GLARGINE 100 [IU]/ML
INJECTION, SOLUTION SUBCUTANEOUS
Qty: 15 ML | Refills: 0 | Status: SHIPPED | OUTPATIENT
Start: 2020-03-20 | End: 2020-01-01

## 2020-05-21 NOTE — PROGRESS NOTES
"Subjective   Michelle Irene Pierce is a 74 y.o. female.     Ms. Pierce is a 74-year-old female who presents today for follow-up related to hypertension, hyperlipidemia, type 2 diabetes, tobacco use and evaluation of TIA-like symptoms.  Blood pressure today is 120/70, heart rate 104.  She denies chest pain, shortness of breath, palpitations or edema.  She denies any symptoms of hyper or hypoglycemia.  Patient continues to be a chronic smoker with no interest in smoking cessation.  Patient reports approximately 1-1/2 months ago she had what she calls \"TIA like symptoms\".  She reports becoming dizzy and felt like her eyes were spinning and had widespread weakness to where she cannot move her arms.  She is unsure if she had any speech difficulties as she did not try to speak.  She said that she just plan on sitting still until her symptoms past.  She did not go to the ER after symptoms resolved.  She reports symptoms lasted for approximately 1-1/2 hours.  She reports no residual neurologic deficits such as altered gait, difficulty with transitioning, slurred speech, altered smile.  She denies chest pain or shortness of breath during this episode.  She is unsure of what her blood sugar was during this episode.       The following portions of the patient's history were reviewed and updated as appropriate: allergies, current medications, past family history, past medical history, past social history, past surgical history and problem list.    Review of Systems   Constitutional: Negative for activity change, appetite change, fatigue, unexpected weight gain and unexpected weight loss.   HENT: Negative for congestion, sore throat, trouble swallowing and voice change.    Eyes: Negative.    Respiratory: Negative for cough, chest tightness, shortness of breath and wheezing.    Cardiovascular: Negative for chest pain, palpitations and leg swelling.   Gastrointestinal: Negative for abdominal pain, constipation, diarrhea, nausea and " vomiting.   Endocrine: Negative.  Negative for cold intolerance, heat intolerance, polydipsia, polyphagia and polyuria.   Genitourinary: Negative for dysuria.   Musculoskeletal: Negative for arthralgias and myalgias.   Skin: Negative for rash.   Allergic/Immunologic: Negative.    Neurological: Positive for dizziness (x1 episode, resolved) and weakness (x1 episode, resolved ). Negative for tremors, seizures, syncope, facial asymmetry, speech difficulty, light-headedness, numbness, headache, memory problem and confusion.   Hematological: Negative.    Psychiatric/Behavioral: Negative.        Objective   Physical Exam   Constitutional: She is oriented to person, place, and time. She appears well-developed and well-nourished. No distress.   HENT:   Head: Normocephalic and atraumatic.   Right Ear: External ear normal.   Left Ear: External ear normal.   Nose: Nose normal.   Mouth/Throat: Oropharynx is clear and moist.   Eyes: Pupils are equal, round, and reactive to light. Conjunctivae and EOM are normal.   Neck: Normal range of motion.   Cardiovascular: Normal rate, regular rhythm and normal heart sounds.   Pulmonary/Chest: Effort normal and breath sounds normal. No stridor. No respiratory distress. She has no wheezes. She has no rales.   Musculoskeletal: Normal range of motion. She exhibits no edema or tenderness.   Neurological: She is alert and oriented to person, place, and time. She has normal strength. She is not disoriented. No cranial nerve deficit or sensory deficit. Coordination and gait normal.   No neurologic deficits noted on exam.   Skin: Skin is warm and dry. No rash noted. She is not diaphoretic. No erythema. No pallor.   Psychiatric: She has a normal mood and affect. Her behavior is normal. Judgment and thought content normal.   Nursing note and vitals reviewed.        Assessment/Plan   Michelle was seen today for follow-up.    Diagnoses and all orders for this visit:    Essential hypertension    Mixed  hyperlipidemia    Diabetes mellitus, type II, insulin dependent (CMS/[facility])    Primary osteoarthritis of both knees    Episode of altered consciousness  -     CT Head Without Contrast; Future  -     ECG 12 Lead; Future    Tobacco dependence    Overactive bladder    Other orders  -     Mirabegron ER (Myrbetriq) 25 MG tablet sustained-release 24 hour 24 hr tablet; Take 1 tablet by mouth Daily.    1.  Essential hypertension-controlled.  No change in therapy at this time.    2.  Mixed hyperlipidemia- continue low-fat diet and statin therapy.  We will continue to monitor.    3.  Type 2 diabetes mellitus, insulin-dependent- encourage strict diabetic diet and frequent glucose monitoring 3-4 times daily and as needed for hyper or hypoglycemic symptoms.    4.  Primary osteoarthritis of bilateral knees-stable.  No change in therapy at this time.    5.  Episode of altered consciousness- CT the head without contrast.  Will call with results.  EKG shows normal sinus rhythm with right bundle renae block.  Will have cardiology review.  Will call with results.  Normal neuro exam today.    6.  Overactive bladder- we will attempt to get coverage for Myrbetriq 25 mg 1 capsule p.o. daily.  Oxybutynin causing severe dry mouth symptoms.    7.  Follow-up in 3 months or sooner if needed.  Plan of care may change pending CT and/or EKG results.            This document has been electronically signed by MAMTA Gifford on May 21, 2020 15:50

## 2020-05-26 NOTE — TELEPHONE ENCOUNTER
Patient was called to let her know a prescription has been sent to preferred pharmacy.  Instructed to take medication 30-60 mins prior to CT, and to not take with any other medications and alcohol.  Patient states she has a .

## 2020-05-26 NOTE — TELEPHONE ENCOUNTER
PT CALLED IN STATING THAT SHE HAS AN APPOINTMENT FOR ON 6/01/2020 FOR A CT SCAN. PT STATES THAT SHE IS CLAUSTROPHOBIC SO WAS TOLD TO CALL TO SEE IF SHE COULD GET A MILD SEDATIVE PRESCRIBED JUST IN CASE SHE CANT HANDLE IT. PT STATES THAT SHE WOULD LIKE MEDICATION SENT TO THE DOCTOR THE MORNING OF SURGERY BECAUSE SHE WILL NOT BE ABLE TO DRIVE TO PICK IT UP FROM A PHARMACY    PT CONTACT: [phone]

## 2020-05-26 NOTE — TELEPHONE ENCOUNTER
Xanax 0.25 mg p.o. x1 tablet to be taken 30 to 60 minutes prior to imaging procedure.  She must have a  on the day of the procedure.  Do not mix with other medications that cause drowsiness and do not mix with alcohol.

## 2020-05-26 NOTE — PROGRESS NOTES
Candido #[id number] reviewed in regards to Xanax 0.25 mg prescription prescribed on 5/26/2020.  Quantity 1 to be taken 30 to 60 minutes prior to imaging procedure.  Patient was instructed and verified that she will have a  for the day of the procedure.  Instructed patient to take with caution.

## 2020-06-01 NOTE — PROGRESS NOTES
CT the head has no acute changes per radiology report.  Follow-up as scheduled.  Present to the ER if she has any new episodes.

## 2020-07-01 NOTE — PROGRESS NOTES
Sleep Clinic Follow Up      You have chosen to receive care through a telephone visit. Do you consent to use a telephone visit for your medical care today? Yes    Date: 2020  Primary Care Physician: Avelino Fitzgerald APRN    Last office visit: 2019 (I reviewed this note)    CC: Follow up: JENA on BiPAP      Interim History:  Since the last visit:    1) severe JENA -  Michelle Pierce has remained compliant with BiPAP. She denies mask and machine issues, dry mouth, headaches, or pressures intolerance. She denies abnormal dreams, sleep paralysis, nasal congestion, URI sx.    Sleep Testin. PSG on 10/11/2012, AHI of 74   2. CPAP titration recommended 17/11 cm H2O   3. Currently on 17/11 cm H2O    PAP Data:    Time frame: 2020-2020   Compliance 100 %  Average use on days used: 7 hrs 1 min  Percent of days with usage greater than or equal to 4 hours: 98.9%  PAP range : 17/11 cm H2O  Average 90% pressure: 17/11 cmH2O  Leak: <1 minutes  Average AHI 0.6 events/hr  Mask type: Full face mask  DME: [facility]    Bed time: 1230-0059  Sleep latency: 5-90 minutes  Number of times awakens during the night: 0-2  Wake time: 5799-0462  Estimated total sleep time at night: 5-7 hours  Caffeine intake: 3 cups of coffee, 0 cups of tea, and 3 sodas per day  Alcohol intake: 0 drinks per week  Nap time: none   Sleepiness with Driving: none       2) Patient reports rare RLS symptoms.     PMHx, FH, SH reviewed and pertinent changes are: Had possible TIA. Diagnosed with right bundle branch block - following Cardiology.       REVIEW OF SYSTEMS:   Negative for chest pain, SOA, fever, chills, cough, N/V/D, abdominal pain.    Smokin-2 ppd    Michelle Pierce  reports that she has been smoking cigarettes. She has a 50.00 pack-year smoking history. She has never used smokeless tobacco.. I have educated her on the risk of diseases from using tobacco products such as cancer, COPD and heart diease.     I advised her to quit and  she is not willing to quit.    I spent 3  minutes counseling the patient.      Exam:  Unable to perform physical exam due to conducting telephone visit    Past Medical History:   Diagnosis Date   • Arthritis of spine    • Chronic obstructive lung disease (CMS/HCC)    • Cirrhosis (CMS/HCC)    • Depression    • Diabetes mellitus (CMS/HCC)    • Hyperlipidemia    • Hypertension    • Neurologic disorder    • Obesity    • Sleep apnea    • Tobacco dependence        Current Outpatient Medications:   •  ACCU-CHEK KAYKAY PLUS test strip, TEST EVERY DAY AS DIRECTED, Disp: 100 each, Rfl: 11  •  albuterol (VENTOLIN HFA) 108 (90 Base) MCG/ACT inhaler, Inhale 2 puffs Every 4 (Four) Hours As Needed for Wheezing., Disp: 1 inhaler, Rfl: 0  •  B Complex Vitamins (VITAMIN B COMPLEX) capsule capsule, Take 1 capsule by mouth daily., Disp: , Rfl:   •  B-D UF III MINI PEN NEEDLES 31G X 5 MM misc, USE ONCE DAILY, Disp: 100 each, Rfl: 12  •  betamethasone dipropionate (DIPROLENE) 0.05 % ointment, Apply  topically 2 (Two) Times a Day., Disp: 45 g, Rfl: 2  •  cholecalciferol (VITAMIN D3) 1000 UNITS tablet, Take 1,000 Units by mouth daily., Disp: , Rfl:   •  doxycycline (VIBRAMYCIN) 100 MG capsule, , Disp: , Rfl:   •  ferrous sulfate 325 (65 FE) MG tablet, Take 1 tablet by mouth 2 (Two) Times a Day., Disp: 60 tablet, Rfl: 5  •  HM GAS RELIEF 80 MG chewable tablet, CHEW 1 TABLET UP TO FOUR TIMES DAILY AS NEEDED FOR FLATULENCE, Disp: , Rfl: 0  •  Insulin Glargine (BASAGLAR KWIKPEN) 100 UNIT/ML injection pen, INJECT 45 UNITS SUBQ EVERY NIGHT, Disp: 15 mL, Rfl: 0  •  levoFLOXacin (LEVAQUIN) 500 MG tablet, Take 1 tablet by mouth Daily., Disp: 7 tablet, Rfl: 0  •  lisinopril (PRINIVIL,ZESTRIL) 5 MG tablet, Take 0.5 tablets by mouth Daily., Disp: 90 tablet, Rfl: 1  •  lovastatin (MEVACOR) 40 MG tablet, TAKE 1 TABLET BY MOUTH EVERY NIGHT, Disp: 90 tablet, Rfl: 0  •  metFORMIN (GLUCOPHAGE) 1000 MG tablet, TAKE 1 TABLET TWICE DAILY WITH MEALS, Disp: 180  "tablet, Rfl: 1  •  Mirabegron ER (Myrbetriq) 25 MG tablet sustained-release 24 hour 24 hr tablet, Take 1 tablet by mouth Daily., Disp: 30 tablet, Rfl: 3  •  omeprazole (priLOSEC) 40 MG capsule, TAKE 1 CAPSULE BY MOUTH EVERY DAY, Disp: 30 capsule, Rfl: 1  •  traZODone (DESYREL) 50 MG tablet, TAKE 1 TABLET EVERY NIGHT, Disp: 90 tablet, Rfl: 1  •  venlafaxine XR (EFFEXOR-XR) 75 MG 24 hr capsule, TAKE 1 CAPSULE EVERY DAY, Disp: 90 capsule, Rfl: 3      Assessment and Plan:    1. Obstructive sleep apnea Established, stable (1)  1. Compliant with PAP therapy  2. Continue PAP as prescribed  3. Script for PAP supplies  4. Return to clinic in 1 year with compliance report unless change in symptoms in interim period  2. Nicotine dependence with complication self-limited or minor problem  1. Given [facility] \"Thinking of quitting smoking\" flyer and referred patient to call [phone]. Smoking and tobacco use cessation counseling visit was 3 minutes.     This visit has been rescheduled as a phone visit to comply with patient safety concerns in accordance with CDC recommendations. Total time of discussion was 7 minutes.    4 of 7 minutes spent telephone counseling patient extensively regarding:   PAP therapy, PAP compliance, PAP maintenance and Tobacco cessation      RTC in 12 months. Patient agrees to return sooner if changes in symptoms.        This document has been electronically signed by MAMTA Stiles on July 1, 2020 13:47          CC: Avelino Fitzgerald APRN          No ref. provider found    "

## 2020-08-26 PROBLEM — I45.10 RBBB: Status: ACTIVE | Noted: 2020-01-01

## 2020-08-26 PROBLEM — R06.09 DYSPNEA ON EXERTION: Status: ACTIVE | Noted: 2020-01-01

## 2020-08-26 NOTE — PROGRESS NOTES
Establish Care (refer by Dr. Sancho Fitzgerald)      History of Present Illness     Mrs. Michelle Pierce is a 74-year-old  female with past medical history of COPD, HTN, Anx/DPN, Type II DM, HLD, JNEA with CPAP compliance, current smoker 1ppd/50 yrs and questionable recent TIA/syncopal episode (no neurologic deficits). She presents today as a referral from PCP for evaluation of abnormal EKG. EKG repeated today in office showing RBBB with left posterior fascicular block/bifascicular block.  She denies chest pain, palpitations, edema, orthopnea. Reports she has COPD and has chronic shortness of breath that has been gradually worsening.     Previous diagnostic testing for coronary artery disease: None.  Previous history of cardiac disease includes none. Patient denies history of angina, arrhythmia, CABG, cardiomyopathy, CHF, coronary angioplasty, coronary artery disease, coronary artery stent, ischemic heart disease, pericarditis, previous M.I. and valvular disease      The 10-year ASCVD risk score (Miguel MITCHELL Jr., et al., 2013) is: 44.2%    Values used to calculate the score:      Age: 74 years      Sex: Female      Is Non- : No      Diabetic: Yes      Tobacco smoker: Yes      Systolic Blood Pressure: 138 mmHg      Is BP treated: Yes      HDL Cholesterol: 48 mg/dL      Total Cholesterol: 168 mg/dL      Cardiac Risk Factors:  diabetes mellitus, hypercholesterolemia, hypertension, smoking, Sedentary life style and Obesity  Comments Denies prior cardiac hx    Past Medical History:   Diagnosis Date   • Arthritis of spine    • Chronic obstructive lung disease (CMS/HCC)    • Cirrhosis (CMS/HCC)    • Depression    • Diabetes mellitus (CMS/HCC)    • Hyperlipidemia    • Hypertension    • Neurologic disorder    • Obesity    • Sleep apnea    • Tobacco dependence      Past Surgical History:   Procedure Laterality Date   • BACK SURGERY  2013    May 1 or 2, 2013   • COLONOSCOPY  01/21/2005   • COLONOSCOPY N/A  6/1/2017    Procedure: COLONOSCOPY;  Surgeon: Mat Jennings MD;  Location: [facility] ENDOSCOPY;  Service:    • ESOPHAGOSCOPY / EGD  02/25/1998    Mild esophagitis, mild gastritis, the most likely diagnosis for the esophageal inflammation is reflux esophagitis.   • EYE SURGERY      cataract, right eye   • FINGER SURGERY  04/07/1994    Partial amputation, right ring finger   • HYSTERECTOMY     • TUBAL ABDOMINAL LIGATION       Social History     Socioeconomic History   • Marital status:      Spouse name: Not on file   • Number of children: Not on file   • Years of education: Not on file   • Highest education level: Not on file   Occupational History   • Occupation: Retired     Comment: Grandson resides with patient.   Tobacco Use   • Smoking status: Current Every Day Smoker     Packs/day: 1.00     Years: 50.00     Pack years: 50.00     Types: Cigarettes   • Smokeless tobacco: Never Used   Substance and Sexual Activity   • Alcohol use: No   • Drug use: No   • Sexual activity: Defer     Family History   Problem Relation Age of Onset   • Diabetes Other    • Heart disease Other    • Hypertension Other    • Diabetes Mother    • Hypertension Father    • Heart attack Father    • Kidney failure Father    • No Known Problems Maternal Grandmother    • No Known Problems Maternal Grandfather    • No Known Problems Paternal Grandmother    • No Known Problems Paternal Grandfather        ALLERGIES:  Allergies   Allergen Reactions   • Morpholine Salicylate Shortness Of Breath     Morphine allergy, states couldn't breathe   • Morphine And Related      Dyspnea   • Morphine Sulfate Unknown (See Comments)   • Prozac [Fluoxetine Hcl] Anxiety         Review of Systems   Constitution: Negative for chills, fever, malaise/fatigue and weight gain.   HENT: Negative for nosebleeds and tinnitus.    Eyes: Negative for blurred vision and double vision.   Cardiovascular: Positive for dyspnea on exertion. Negative for chest pain, irregular  heartbeat, leg swelling, palpitations and syncope.   Respiratory: Positive for shortness of breath. Negative for cough, sleep disturbances due to breathing and snoring.    Endocrine: Negative for polydipsia, polyphagia and polyuria.   Hematologic/Lymphatic: Negative for bleeding problem. Does not bruise/bleed easily.   Skin: Negative for color change and suspicious lesions.   Musculoskeletal: Negative for falls and myalgias.   Gastrointestinal: Negative for bloating, heartburn and hematochezia.   Genitourinary: Negative for dysuria and hematuria.   Neurological: Negative for dizziness, headaches, seizures, vertigo and weakness.   Psychiatric/Behavioral: Positive for depression. Negative for altered mental status. The patient is nervous/anxious. The patient does not have insomnia.    Allergic/Immunologic: Negative for environmental allergies and persistent infections.       Current Outpatient Medications   Medication Sig Dispense Refill   • ACCU-CHEK KAYKAY PLUS test strip TEST EVERY DAY AS DIRECTED 100 each 11   • albuterol (VENTOLIN HFA) 108 (90 Base) MCG/ACT inhaler Inhale 2 puffs Every 4 (Four) Hours As Needed for Wheezing. 1 inhaler 0   • ALPRAZolam (XANAX) 0.25 MG tablet      • B Complex Vitamins (VITAMIN B COMPLEX) capsule capsule Take 1 capsule by mouth daily.     • B-D UF III MINI PEN NEEDLES 31G X 5 MM misc USE ONCE DAILY 100 each 12   • betamethasone dipropionate (DIPROLENE) 0.05 % ointment Apply  topically 2 (Two) Times a Day. 45 g 2   • cholecalciferol (VITAMIN D3) 1000 UNITS tablet Take 1,000 Units by mouth daily.     • doxycycline (VIBRAMYCIN) 100 MG capsule      • ferrous sulfate 325 (65 FE) MG tablet Take 1 tablet by mouth 2 (Two) Times a Day. 60 tablet 5   • HM GAS RELIEF 80 MG chewable tablet CHEW 1 TABLET UP TO FOUR TIMES DAILY AS NEEDED FOR FLATULENCE  0   • Insulin Glargine (BASAGLAR KWIKPEN) 100 UNIT/ML injection pen INJECT 45 UNITS SUBQ EVERY NIGHT 15 mL 0   • Insulin Glargine (BASAGLAR  KWIKPEN) 100 UNIT/ML injection pen INJECT 45 UNITS SUBCUTANEOUSLY EVERY NIGHT     • levoFLOXacin (LEVAQUIN) 500 MG tablet Take 1 tablet by mouth Daily. 7 tablet 0   • lisinopril (PRINIVIL,ZESTRIL) 5 MG tablet Take 0.5 tablets by mouth Daily. 90 tablet 1   • lovastatin (MEVACOR) 40 MG tablet TAKE 1 TABLET BY MOUTH EVERY NIGHT 90 tablet 0   • metFORMIN (GLUCOPHAGE) 1000 MG tablet TAKE 1 TABLET TWICE DAILY WITH MEALS 180 tablet 1   • Mirabegron ER (Myrbetriq) 25 MG tablet sustained-release 24 hour 24 hr tablet Take 1 tablet by mouth Daily. 30 tablet 3   • omeprazole (priLOSEC) 40 MG capsule TAKE 1 CAPSULE BY MOUTH EVERY DAY 30 capsule 1   • traZODone (DESYREL) 50 MG tablet TAKE 1 TABLET EVERY NIGHT 90 tablet 1   • venlafaxine XR (EFFEXOR-XR) 75 MG 24 hr capsule TAKE 1 CAPSULE EVERY DAY 90 capsule 3     No current facility-administered medications for this visit.        OBJECTIVE:    Physical Exam:   Physical Exam   Constitutional: She is oriented to person, place, and time. Vital signs are normal. She appears well-developed and well-nourished.  Non-toxic appearance. She does not have a sickly appearance. No distress.   HENT:   Head: Normocephalic and atraumatic.   Right Ear: External ear normal.   Left Ear: External ear normal.   Eyes: Conjunctivae and lids are normal.   Neck: Normal range of motion. Neck supple. No JVD present.   Cardiovascular: Regular rhythm, S1 normal, S2 normal, normal heart sounds, intact distal pulses and normal pulses. Tachycardia present. PMI is not displaced. Exam reveals no gallop, no S3, no S4 and no friction rub.   No murmur heard.  Pulmonary/Chest: Effort normal. No respiratory distress. She has no decreased breath sounds. She has wheezes. She has no rales. She exhibits no tenderness.   Abdominal: Soft. Normal appearance and bowel sounds are normal. She exhibits no distension. There is no tenderness.   Musculoskeletal: She exhibits no edema (Chronic venous stasis changes to bilateral  "lower extremities).   Neurological: She is alert and oriented to person, place, and time. Gait normal.   Skin: Skin is warm and dry. No rash noted. She is not diaphoretic. No erythema. No pallor.   Psychiatric: She has a normal mood and affect. Her behavior is normal. Judgment and thought content normal.   Vitals reviewed.    Vitals:    08/26/20 1258   BP: 138/78   BP Location: Left arm   Patient Position: Sitting   Cuff Size: Adult   Pulse: 108   SpO2: 95%   Weight: 101 kg (222 lb 4.8 oz)   Height: 162.6 cm (64\")       DATA REVIEWED:        No radiology results for the last 30 days.  CXR:     CT:     Labs: BMP, CBC, LIPID, TSH  Lab Results   Component Value Date    GLUCOSE 82 05/07/2019    CALCIUM 9.7 05/07/2019     05/07/2019    K 4.4 05/07/2019    CO2 27.4 05/07/2019     05/07/2019    BUN 10 05/07/2019    CREATININE 0.68 05/07/2019    EGFRIFNONA 85 05/07/2019    BCR 14.7 05/07/2019    ANIONGAP 9.6 05/07/2019     Lab Results   Component Value Date    WBC 8.53 05/07/2019    HGB 12.5 05/07/2019    HCT 38.9 05/07/2019    MCV 91.5 05/07/2019     05/07/2019     Lab Results   Component Value Date    CHOL 168 05/07/2019    CHOL 170 04/02/2018    CHOL 163 03/23/2017     Lab Results   Component Value Date    TRIG 117 05/07/2019    TRIG 119 04/02/2018    TRIG 128 03/23/2017     Lab Results   Component Value Date    HDL 48 05/07/2019    HDL 48 (L) 04/02/2018    HDL 45 (L) 03/23/2017     No components found for: LDLCALC  Lab Results   Component Value Date    LDL 97 05/07/2019    LDL 83 04/02/2018    LDL 78 03/23/2017     No results found for: HDLLDLRATIO  No components found for: CHOLHDL  Lab Results   Component Value Date    TSH 3.000 05/07/2019     No results found for: PROBNP  EKG:     TTE:     Stress tests:     LHC:      The following portions of the patient's history were reviewed and updated as appropriate: allergies, current medications, past family history, past medical history, past social history, " past surgical history and problem list.  Old records reviewed and pertinent information is included in the above objective data.     ASSESSMENT/PLAN:       Diagnosis Plan   1. Abnormal EKG/ RBBB/bifascicular block Adult Transthoracic Echo Complete W/ Cont if Necessary Per Protocol    Mobile Cardiac Outpatient Telemetry    ? Holter monitor to detect atrioventricular (AV) block and tachyarrhythmias. As well she had an episode of TIA/possible stroke or syncope recently.  ? echocardiography to confirm or rule out underlying structural heart disease   ? Patient with cardiac risk factors, however is asymptomatic. No CP. If echocardiogram abnormal can consider use myocardial perfusion imaging      2. Dyspnea on exertion  Adult Transthoracic Echo Complete W/ Cont if Necessary Per Protocol    Mobile Cardiac Outpatient Telemetry   3. Essential hypertension  HTN, essential.   BP noted to be mildly elevated today in office, S1, S2 normal, no gallop, no murmur, chest clear, no JVD, no HSM, no edema.    LVH: exam deferred.  LVEF:Not Available.   Diastolic function:Not Available.  End-organ damage: no evidence    DASH; medication compliance  TTE for LVH assessment    Continue current management with Lisinopril 5mg        4. Mixed hyperlipidemia  HLD   Dyslipidemia under excellent control.  Statin:  Yes.  Lovastatin 40mg   Smoking cessation  Michelle Pierce  reports that she has been smoking cigarettes. She has a 50.00 pack-year smoking history. She has never used smokeless tobacco. I have educated her on the risk of diseases from using tobacco products such as cancer, COPD and heart diease.     I advised her to quit and she is not willing to quit.    I spent 3  minutes counseling the patient.                  Follow up: 1 month            This document has been electronically signed by MAMTA Boyce on August 26, 2020 16:45

## 2020-08-27 NOTE — PROGRESS NOTES
Subjective   Michelle Pierce is a 74 y.o. female.     Ms. Pierce is a 74-year-old female who presents today for Medicare wellness exam and follow-up for hypertension, hyperlipidemia, type 2 diabetes, overactive bladder, depression, tobacco use disorder, bilateral osteoarthritis of knees, insomnia, GERD and iron deficiency.  Blood pressure today is 130/70, heart rate 95.  She denies chest pain, shortness of breath, palpitations or edema.  She continues to follow a low-fat diet and take lovastatin for the management of hyperlipidemia.  Patient denies any symptoms of hyper or hypoglycemia.  Overactive bladder symptoms well controlled.  She denies any suicidal homicidal ideations.  Patient continues to smoke cigarettes daily and has no desire to quit at this time.  Bilateral knee pain stable.  Insomnia well controlled.  GERD well controlled with omeprazole and trigger food avoidance.  Patient has a history of iron deficiency but denies fatigue, shortness of breath or cyanosis.       The following portions of the patient's history were reviewed and updated as appropriate: allergies, current medications, past family history, past medical history, past social history, past surgical history and problem list.    Review of Systems   Constitutional: Negative for activity change, appetite change, chills, diaphoresis, fatigue, fever, unexpected weight gain and unexpected weight loss.   HENT: Negative for congestion, sore throat, trouble swallowing and voice change.    Eyes: Negative for blurred vision, double vision, photophobia, pain and visual disturbance.   Respiratory: Negative for cough, chest tightness, shortness of breath and wheezing.    Cardiovascular: Negative for chest pain, palpitations and leg swelling.   Gastrointestinal: Negative for abdominal distention, abdominal pain, anal bleeding, blood in stool, constipation, diarrhea, nausea, vomiting, GERD and indigestion.   Endocrine: Negative for cold intolerance, heat  intolerance, polydipsia, polyphagia and polyuria.   Genitourinary: Negative for dysuria, frequency, hematuria and urgency.   Musculoskeletal: Positive for arthralgias and gait problem. Negative for myalgias.   Skin: Negative for rash.   Allergic/Immunologic: Negative.    Neurological: Negative for dizziness, syncope, weakness, light-headedness and headache.   Hematological: Negative.    Psychiatric/Behavioral: Negative for agitation, behavioral problems, decreased concentration, dysphoric mood, hallucinations, self-injury, sleep disturbance, suicidal ideas, negative for hyperactivity, depressed mood and stress. The patient is not nervous/anxious.        Objective   Physical Exam   Constitutional: She is oriented to person, place, and time. She appears well-developed and well-nourished. No distress.   HENT:   Head: Normocephalic and atraumatic.   Right Ear: External ear normal.   Left Ear: External ear normal.   Nose: Nose normal.   Mouth/Throat: Oropharynx is clear and moist.   Eyes: Pupils are equal, round, and reactive to light. Conjunctivae and EOM are normal.   Neck: Normal range of motion. Neck supple. No tracheal deviation present. No thyromegaly present.   Cardiovascular: Normal rate, regular rhythm, normal heart sounds and intact distal pulses. Exam reveals no gallop and no friction rub.   No murmur heard.  Pulmonary/Chest: Effort normal and breath sounds normal. No stridor. No respiratory distress. She has no wheezes. She has no rales.   Abdominal: Soft. Bowel sounds are normal. She exhibits no distension and no mass. There is no tenderness. There is no rebound and no guarding. No hernia.   Musculoskeletal: She exhibits no edema.        Right knee: She exhibits decreased range of motion. Tenderness found.        Left knee: She exhibits decreased range of motion. Tenderness found.   Lymphadenopathy:     She has no cervical adenopathy.   Neurological: She is alert and oriented to person, place, and time. No  cranial nerve deficit. Coordination normal.   Skin: Skin is warm and dry. No rash noted. She is not diaphoretic. No erythema. No pallor.   Psychiatric: She has a normal mood and affect. Her behavior is normal. Judgment and thought content normal.   Nursing note and vitals reviewed.        Assessment/Plan   Michelle was seen today for medicare wellness-initial visit.    Diagnoses and all orders for this visit:    Medicare annual wellness visit, initial  -     CBC & Differential; Future  -     Comprehensive Metabolic Panel; Future  -     Hemoglobin A1c; Future  -     Lipid Panel; Future  -     TSH; Future  -     T4, Free; Future    Essential hypertension    Mixed hyperlipidemia  -     Lipid Panel; Future    Diabetes mellitus, type II, insulin dependent (CMS/[facility])  -     Comprehensive Metabolic Panel; Future  -     Hemoglobin A1c; Future  -     Microalbumin / Creatinine Urine Ratio - Urine, Clean Catch; Future    Overactive bladder    Mild episode of recurrent major depressive disorder (CMS/HCC)    Tobacco dependence    Primary osteoarthritis of both knees    Primary insomnia    Gastroesophageal reflux disease without esophagitis    Iron deficiency  -     CBC & Differential; Future  -     Iron Profile; Future  -     Vitamin B12; Future  -     Ferritin; Future    Encounter for diabetic foot exam (CMS/HCC)    Episode of altered consciousness  -     US Carotid Bilateral; Future    Personal history of transient ischemic attack (TIA), and cerebral infarction without residual deficits   -     US Carotid Bilateral; Future    Screening mammography declined    Other orders  -     Zoster Vac Recomb Adjuvanted (Shingrix) 50 MCG/0.5ML reconstituted suspension; Inject 0.5 mL into the appropriate muscle as directed by prescriber 1 (One) Time for 1 dose.             The ABCs of the Annual Wellness Visit  Initial Medicare Wellness Visit    Chief Complaint   Patient presents with   • Medicare Wellness-Initial Visit       Subjective      History of Present Illness:  Michelle Piecre is a 74 y.o. female who presents for an Initial Medicare Wellness Visit.    HEALTH RISK ASSESSMENT    Recent Hospitalizations:  No hospitalization(s) within the last year.    Current Medical Providers:  Patient Care Team:  Avelino Fitzgerald APRN as PCP - General (Nurse Practitioner)  Avelino Fitzgerald APRN as PCP - Claims Attributed  Brendon Lyon DPM as Consulting Physician (Podiatry)  Helena Peterson MA as Medical Assistant    Smoking Status:  Social History     Tobacco Use   Smoking Status Current Every Day Smoker   • Packs/day: 1.00   • Years: 50.00   • Pack years: 50.00   • Types: Cigarettes   Smokeless Tobacco Never Used       Alcohol Consumption:  Social History     Substance and Sexual Activity   Alcohol Use No       Depression Screen:   PHQ-2/PHQ-9 Depression Screening 8/27/2020   Little interest or pleasure in doing things 0   Feeling down, depressed, or hopeless 0   Trouble falling or staying asleep, or sleeping too much 0   Feeling tired or having little energy 3   Poor appetite or overeating 0   Feeling bad about yourself - or that you are a failure or have let yourself or your family down 0   Trouble concentrating on things, such as reading the newspaper or watching television 0   Moving or speaking so slowly that other people could have noticed. Or the opposite - being so fidgety or restless that you have been moving around a lot more than usual 0   Thoughts that you would be better off dead, or of hurting yourself in some way 0   Total Score 3   If you checked off any problems, how difficult have these problems made it for you to do your work, take care of things at home, or get along with other people? Somewhat difficult       Fall Risk Screen:  STEADI Fall Risk Assessment has not been completed.    Health Habits and Functional and Cognitive Screening:  Functional & Cognitive Status 8/27/2020   Do you have difficulty preparing food and eating?  No   Do you have difficulty bathing yourself, getting dressed or grooming yourself? No   Do you have difficulty using the toilet? No   Do you have difficulty moving around from place to place? Yes   Do you have trouble with steps or getting out of a bed or a chair? Yes   Current Diet Well Balanced Diet   Dental Exam Up to date   Eye Exam Up to date   Exercise (times per week) 0 times per week   Current Exercise Activities Include Housecleaning   Do you need help using the phone?  No   Are you deaf or do you have serious difficulty hearing?  No   Do you need help with transportation? No   Do you need help shopping? No   Do you need help preparing meals?  No   Do you need help with housework?  No   Do you need help with laundry? No   Do you need help taking your medications? No   Do you need help managing money? No   Do you ever drive or ride in a car without wearing a seat belt? No   Have you felt unusual stress, anger or loneliness in the last month? Yes   Who do you live with? Other   If you need help, do you have trouble finding someone available to you? No   Have you been bothered in the last four weeks by sexual problems? No   Do you have difficulty concentrating, remembering or making decisions? Yes         Does the patient have evidence of cognitive impairment? No    Asprin use counseling:Does not need AS but is currently taking (discussed benefits vs risks and patient elects to stay on ASA)    Age-appropriate Screening Schedule:  Refer to the list below for future screening recommendations based on patient's age, sex and/or medical conditions. Orders for these recommended tests are listed in the plan section. The patient has been provided with a written plan.    Health Maintenance   Topic Date Due   • URINE MICROALBUMIN  08/21/2019   • HEMOGLOBIN A1C  02/15/2020   • LIPID PANEL  05/07/2020   • DIABETIC EYE EXAM  07/10/2020   • INFLUENZA VACCINE  10/24/2020 (Originally 8/1/2020)   • COLONOSCOPY  08/02/2021  (Originally 6/1/2019)   • MAMMOGRAM  08/27/2021 (Originally 9/21/2018)   • ZOSTER VACCINE (1 of 2) 08/27/2021 (Originally 10/13/1997)   • DIABETIC FOOT EXAM  08/27/2021   • TDAP/TD VACCINES (2 - Td) 09/21/2026          The following portions of the patient's history were reviewed and updated as appropriate: allergies, current medications, past family history, past medical history, past social history, past surgical history and problem list.    Outpatient Medications Prior to Visit   Medication Sig Dispense Refill   • ACCU-CHEK KAYKAY PLUS test strip TEST EVERY DAY AS DIRECTED 100 each 11   • B Complex Vitamins (VITAMIN B COMPLEX) capsule capsule Take 1 capsule by mouth daily.     • B-D UF III MINI PEN NEEDLES 31G X 5 MM misc USE ONCE DAILY 100 each 12   • betamethasone dipropionate (DIPROLENE) 0.05 % ointment Apply  topically 2 (Two) Times a Day. 45 g 2   • cholecalciferol (VITAMIN D3) 1000 UNITS tablet Take 1,000 Units by mouth daily.     • Fluticasone Furoate-Vilanterol (BREO ELLIPTA IN) Inhale.     • Insulin Glargine (BASAGLAR KWIKPEN) 100 UNIT/ML injection pen INJECT 45 UNITS SUBQ EVERY NIGHT 15 mL 0   • lisinopril (PRINIVIL,ZESTRIL) 5 MG tablet Take 0.5 tablets by mouth Daily. 90 tablet 1   • lovastatin (MEVACOR) 40 MG tablet TAKE 1 TABLET BY MOUTH EVERY NIGHT 90 tablet 0   • metFORMIN (GLUCOPHAGE) 1000 MG tablet TAKE 1 TABLET TWICE DAILY WITH MEALS 180 tablet 1   • Mirabegron ER (Myrbetriq) 25 MG tablet sustained-release 24 hour 24 hr tablet Take 1 tablet by mouth Daily. 30 tablet 3   • omeprazole (priLOSEC) 40 MG capsule TAKE 1 CAPSULE BY MOUTH EVERY DAY 30 capsule 1   • traZODone (DESYREL) 50 MG tablet TAKE 1 TABLET EVERY NIGHT 90 tablet 1   • venlafaxine XR (EFFEXOR-XR) 75 MG 24 hr capsule TAKE 1 CAPSULE EVERY DAY 90 capsule 3   • ALPRAZolam (XANAX) 0.25 MG tablet      • doxycycline (VIBRAMYCIN) 100 MG capsule      • albuterol (VENTOLIN HFA) 108 (90 Base) MCG/ACT inhaler Inhale 2 puffs Every 4 (Four) Hours  As Needed for Wheezing. 1 inhaler 0   • ferrous sulfate 325 (65 FE) MG tablet Take 1 tablet by mouth 2 (Two) Times a Day. 60 tablet 5   • HM GAS RELIEF 80 MG chewable tablet CHEW 1 TABLET UP TO FOUR TIMES DAILY AS NEEDED FOR FLATULENCE  0   • Insulin Glargine (BASAGLAR KWIKPEN) 100 UNIT/ML injection pen INJECT 45 UNITS SUBCUTANEOUSLY EVERY NIGHT     • levoFLOXacin (LEVAQUIN) 500 MG tablet Take 1 tablet by mouth Daily. 7 tablet 0     No facility-administered medications prior to visit.        Patient Active Problem List   Diagnosis   • Diabetes mellitus, type II, insulin dependent (CMS/[facility])   • Essential hypertension   • Depression   • Tobacco dependence   • Sleep apnea   • Skin cancer   • Pain in both knees   • Primary osteoarthritis of both knees   • Insomnia   • Spinal instability, lumbar   • Spondylolisthesis, acquired   • Thoracic or lumbosacral neuritis or radiculitis, unspecified   • Hyperlipidemia   • Gastroesophageal reflux disease without esophagitis   • Overactive bladder   • Dyspnea on exertion   • RBBB       Advanced Care Planning:  ACP discussion was held with the patient during this visit. Patient does not have an advance directive, information provided.    Review of Systems   Constitutional: Negative for activity change, appetite change, chills, diaphoresis, fatigue, fever, weight gain and weight loss.   HENT: Negative for congestion, sore throat, trouble swallowing and voice change.    Eyes: Negative for blurred vision, double vision, photophobia, pain and visual disturbance.   Respiratory: Negative for cough, chest tightness, shortness of breath and wheezing.    Cardiovascular: Negative for chest pain, palpitations and leg swelling.   Gastrointestinal: Negative for abdominal distention, abdominal pain, anal bleeding, blood in stool, constipation, diarrhea, nausea and vomiting.   Endocrine: Negative for cold intolerance, heat intolerance, polydipsia, polyphagia and polyuria.   Genitourinary: Negative  "for dysuria, frequency, hematuria and urgency.   Musculoskeletal: Positive for arthralgias and gait problem. Negative for myalgias.   Skin: Negative for rash.   Allergic/Immunologic: Negative.    Neurological: Negative for dizziness, syncope, weakness and light-headedness.   Hematological: Negative.    Psychiatric/Behavioral: Negative for agitation, behavioral problems, decreased concentration, dysphoric mood, hallucinations, self-injury, sleep disturbance and suicidal ideas. The patient is not nervous/anxious.        Compared to one year ago, the patient feels her physical health is worse.  Compared to one year ago, the patient feels her mental health is the same.    Reviewed chart for potential of high risk medication in the elderly: yes  Reviewed chart for potential of harmful drug interactions in the elderly:yes    Objective         Vitals:    08/27/20 1304   BP: 130/70   BP Location: Left arm   Patient Position: Sitting   Cuff Size: Adult   Pulse: 95   Resp: 18   SpO2: 96%   Weight: 101 kg (223 lb 11.2 oz)   Height: 162.6 cm (64\")   PainSc: 0-No pain       Body mass index is 38.4 kg/m².  Discussed the patient's BMI with her. The BMI is above average; BMI management plan is completed.    Physical Exam   Constitutional: She is oriented to person, place, and time. She appears well-developed and well-nourished. No distress.   HENT:   Head: Normocephalic and atraumatic.   Right Ear: External ear normal.   Left Ear: External ear normal.   Nose: Nose normal.   Mouth/Throat: Oropharynx is clear and moist.   Eyes: Pupils are equal, round, and reactive to light. Conjunctivae and EOM are normal.   Neck: Normal range of motion. Neck supple. No tracheal deviation present. No thyromegaly present.   Cardiovascular: Normal rate, regular rhythm, normal heart sounds and intact distal pulses. Exam reveals no gallop and no friction rub.   No murmur heard.  Pulmonary/Chest: Effort normal and breath sounds normal. No stridor. No " respiratory distress. She has no wheezes. She has no rales.   Abdominal: Soft. Bowel sounds are normal. She exhibits no distension and no mass. There is no tenderness. There is no rebound and no guarding. No hernia.   Musculoskeletal: She exhibits no edema.        Right knee: She exhibits decreased range of motion. Tenderness found.        Left knee: She exhibits decreased range of motion. Tenderness found.   Lymphadenopathy:     She has no cervical adenopathy.   Neurological: She is alert and oriented to person, place, and time. No cranial nerve deficit. Coordination normal.   Skin: Skin is warm and dry. No rash noted. She is not diaphoretic. No erythema. No pallor.   Psychiatric: She has a normal mood and affect. Her behavior is normal. Judgment and thought content normal.   Nursing note and vitals reviewed.            Assessment/Plan   Medicare Risks and Personalized Health Plan  CMS Preventative Services Quick Reference  Advance Directive Discussion  Breast Cancer/Mammogram Screening  Cardiovascular risk  Colon Cancer Screening  Immunizations Discussed/Encouraged (specific immunizations; Shingrix )  Obesity/Overweight   Polypharmacy  Tobacco Use/Dependance (use dotphrase .tobaccocessation for documentation)    The above risks/problems have been discussed with the patient.  Pertinent information has been shared with the patient in the After Visit Summary.  Follow up plans and orders are seen below in the Assessment/Plan Section.    Diagnoses and all orders for this visit:    1. Medicare annual wellness visit, initial (Primary)  -     CBC & Differential; Future  -     Comprehensive Metabolic Panel; Future  -     Hemoglobin A1c; Future  -     Lipid Panel; Future  -     TSH; Future  -     T4, Free; Future    2. Essential hypertension    3. Mixed hyperlipidemia  -     Lipid Panel; Future    4. Diabetes mellitus, type II, insulin dependent (CMS/[facility])  -     Comprehensive Metabolic Panel; Future  -     Hemoglobin A1c;  Future  -     Microalbumin / Creatinine Urine Ratio - Urine, Clean Catch; Future    5. Overactive bladder    6. Mild episode of recurrent major depressive disorder (CMS/HCC)    7. Tobacco dependence    8. Primary osteoarthritis of both knees    9. Primary insomnia    10. Gastroesophageal reflux disease without esophagitis    11. Iron deficiency  -     CBC & Differential; Future  -     Iron Profile; Future  -     Vitamin B12; Future  -     Ferritin; Future    12. Encounter for diabetic foot exam (CMS/[facility])    13. Episode of altered consciousness  -     US Carotid Bilateral; Future    14. Personal history of transient ischemic attack (TIA), and cerebral infarction without residual deficits   -     US Carotid Bilateral; Future    15. Screening mammography declined    Other orders  -     Cancel: Ambulatory Referral to Gastroenterology  -     Cancel: Mammo Screening Digital Tomosynthesis Bilateral With CAD  -     Zoster Vac Recomb Adjuvanted (Shingrix) 50 MCG/0.5ML reconstituted suspension; Inject 0.5 mL into the appropriate muscle as directed by prescriber 1 (One) Time for 1 dose.  Dispense: 1 each; Refill: 0      Follow Up:  Return in about 3 months (around 11/27/2020), or if symptoms worsen or fail to improve, for Recheck.     An After Visit Summary and PPPS were given to the patient.     1.  Essential hypertension-controlled.  No change in therapy at this time.  We will continue to monitor.    2.  Mixed hyperlipidemia-lipid panel.  Will call with results.  Continue low-fat diet and statin therapy as prescribed.    3.  Type 2 diabetes mellitus, insulin-dependent-CMP and hemoglobin A1c.  Will call with results.  Continue medications as prescribed.  Continue diabetic diet.    4.  Overactive bladder- controlled.  Continue follow-up with urology.  Continue medication as prescribed.    5.  Mild episode of recurrent major depressive disorder-controlled.  No suicidal homicidal ideations.  We will continue to monitor.    6.   Tobacco dependence- patient has no desire to quit smoking at this time.  She is aware of long-term risk of continued smoking including stroke, heart attack and death.  Will continue to encourage smoking cessation.    7.  Primary osteoarthritis of both knees-stable.  We will continue to monitor.    8.  GERD without esophagitis-controlled.  No change in therapy at this time.  Continue medication and GERD trigger food avoidance.    9.  Iron deficiency-CBC and anemia panel.  Will call with results.    10.  Episode of altered consciousness- no repeat episodes.  Ultrasound bilateral carotids.  Will call with results.    11.  Health maintenance- screening mammogram and repeat screening colonoscopy declined.  Routine labs ordered.  Will call with results.  Prescription for Shingrix vaccine sent to pharmacy.    12.  Follow-up in 6 months or sooner for any acute needs.            This document has been electronically signed by MAMTA Gifford on August 27, 2020 16:36

## 2020-08-27 NOTE — PATIENT INSTRUCTIONS
Medicare Wellness  Personal Prevention Plan of Service     Date of Office Visit:  2020  Encounter Provider:  MAMTA Gifford  Place of Service:  [facility]  Patient Name: Michelle Pierce  :  1945    As part of the Medicare Wellness portion of your visit today, we are providing you with this personalized preventive plan of services (PPPS). This plan is based upon recommendations of the [place] Preventive Services Task Force (USPSTF) and the Advisory Committee on Immunization Practices (ACIP).    This lists the preventive care services that should be considered, and provides dates of when you are due. Items listed as completed are up-to-date and do not require any further intervention.    Health Maintenance   Topic Date Due   • ZOSTER VACCINE (1 of 2) 10/13/1997   • MEDICARE ANNUAL WELLNESS  2016   • MAMMOGRAM  2018   • DIABETIC FOOT EXAM  2018   • COLONOSCOPY  2019   • URINE MICROALBUMIN  2019   • HEMOGLOBIN A1C  02/15/2020   • LIPID PANEL  2020   • DIABETIC EYE EXAM  07/10/2020   • INFLUENZA VACCINE  10/24/2020 (Originally 2020)   • TDAP/TD VACCINES (2 - Td) 2026   • HEPATITIS C SCREENING  Completed   • Pneumococcal Vaccine Once at 65 Years Old  Completed   • LUNG CANCER SCREENING  Discontinued       No orders of the defined types were placed in this encounter.      No follow-ups on file.

## 2020-08-28 NOTE — PROGRESS NOTES
Urine test within normal limits.  From what I can see her blood labs were not collected.  She will need to get these done to be able to get refills in the future.  It has been a while since we have had routine labs collected.

## 2020-09-04 NOTE — PROGRESS NOTES
Per radiology report no significant flow-limiting stenosis.  Continue medications as prescribed.  Continue follow-up with cardiology.

## 2020-09-09 NOTE — TELEPHONE ENCOUNTER
Ceftin 500 mg p.o. twice daily for 10 days sent to pharmacy.  Increase fluid intake.  If there is no improvement in 2 days schedule follow-up.  If symptoms worsen follow-up sooner.  If care is needed over the weekend or after hours present to urgent care.

## 2020-09-09 NOTE — TELEPHONE ENCOUNTER
Patient was called to let her know a prescription was sent to pharmacy.  Increase water intake.  Instructed patient to call the office if symptoms do not improve or worsens in 2 days.  Go to UC if needed during weekend hours.

## 2020-09-09 NOTE — TELEPHONE ENCOUNTER
PATIENT CALLING IN STATING THAT SHE IS HAVING LOTS OF BLADDER PAIN AND DISCOMFORT.     PATIENT IS WONDERING IF JANKI WOULD CALL IN AN ANTIBIOTIC FOR IT.     PATIENT IS ALSO REQUESTING A CALL BACK: [phone]

## 2020-09-09 NOTE — PROGRESS NOTES
Triglycerides slightly elevated.  I am going to start her on omega-3 fatty acid 1000 mg twice daily with meals.  Refill of lovastatin sent to pharmacy as well.  Continue to follow a low-fat diet.  Iron saturation slightly low.  Ferrous sulfate 325 mg p.o. twice daily sent to pharmacy.  May take with food if causes GI upset.  Hemoglobin A1c up to 7.4.  I am going to increase her insulin to 50 units nightly.  She needs to follow a strict diabetic diet.  Continue to monitor blood sugar at least twice daily.  Notify this office of hypo-or hyperglycemia.  Follow-up as scheduled.

## 2020-09-16 PROBLEM — I48.92 ATRIAL FLUTTER, PAROXYSMAL (HCC): Status: ACTIVE | Noted: 2020-01-01

## 2020-09-16 NOTE — TELEPHONE ENCOUNTER
Called with the following Holter monitor results.  Patient Holter monitor was extensively discussed with Dr. Terry.    Sinus rhythm with intermittent tachycardia with good average heart rate without significant bradyarrhythmias or pauses noted     Atrial tachycardia at a rate of 130 bpm however the possibility of atrial flutter cannot be excluded      -Given findings of intermittent atrial flutter 2:1 with rapid ventricular rate, patient chads Vasc score= 6.  Patient will be started on beta-blocker to help with rate control.  As well given patient's increased risk factors, including recent TIA patient is a candidate for anticoagulation.  Discussed this in detail with patient and she is agreeable to start Eliquis 5 mg twice daily.  Patient educated regarding dosing, side effects, and symptoms to report. As well, her case was discussed with Dr. Terry our E/P doctor who agrees with treatment plan.                   This document has been electronically signed by MAMTA Boyce on September 16, 2020 15:14 CDT

## 2020-09-24 PROBLEM — I65.23 BILATERAL CAROTID ARTERY STENOSIS: Status: ACTIVE | Noted: 2020-01-01

## 2020-09-24 PROBLEM — J44.9 CHRONIC OBSTRUCTIVE PULMONARY DISEASE (HCC): Status: ACTIVE | Noted: 2020-01-01

## 2020-09-24 PROBLEM — E08.628: Status: ACTIVE | Noted: 2020-01-01

## 2020-09-24 NOTE — PROGRESS NOTES
RBBB (chief complaint)      History of Present Illness     1. Dyspnea on exertion    2. RBBB    3. Atrial flutter, paroxysmal (CMS/HCC)    4. Essential hypertension    5. Mixed hyperlipidemia    6. Tobacco dependence    7. BMI 40.0-44.9, adult (CMS/HCC)    8. 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 Chronic obstructive pulmonary disease, unspecified COPD type (CMS/HCC)    10. Obstructive sleep apnea syndrome    11. Bilateral carotid artery stenosis        Mrs. Michelle Pierce is a 74-year-old  female with past medical history of COPD, HTN, Anx/DPN, Type II DM, HLD, JENA with CPAP compliance, current smoker 1ppd/50 yrs and questionable recent TIA/syncopal episode (no neurologic deficits). She presents today as a follow up for above complaints.    Holter monitor was completed showing: intermittent atrial flutter 2:1 with rapid ventricular rate, patient chads Vasc score= 6.  Patient was started on beta-blocker (metoprolol) to help with rate control.  As well given patient's increased risk factors, including recent TIA patient is a candidate for anticoagulation.  Discussed this in detail with patient and she is agreeable to start Eliquis 5 mg twice daily.  Echocardiogram was completed showing normal biventricular function with LVEF at 51 to 55%.  Mild LVH.  Grade 1 diastolic dysfunction.  Trace to mild MR.  Patient does have COPD and JENA and RVSP is mildly elevated at 35 to 45 mmHg.      The 10-year ASCVD risk score (Bartlettisaias MITCHELL Jr., et al., 2013) is: 35%    Values used to calculate the score:      Age: 74 years      Sex: Female      Is Non- : No      Diabetic: Yes      Tobacco smoker: Yes      Systolic Blood Pressure: 118 mmHg      Is BP treated: Yes      HDL Cholesterol: 51 mg/dL      Total Cholesterol: 184 mg/dL      Cardiac Risk Factors:  diabetes mellitus, hypercholesterolemia, hypertension, smoking, Sedentary life style and Obesity  Comments  Denies prior cardiac hx    Past Medical History:   Diagnosis Date   • Arthritis of spine    • Chronic obstructive lung disease (CMS/HCC)    • Cirrhosis (CMS/HCC)    • Depression    • Diabetes mellitus (CMS/HCC)    • Hyperlipidemia    • Hypertension    • Neurologic disorder    • Obesity    • Sleep apnea    • Tobacco dependence      Past Surgical History:   Procedure Laterality Date   • BACK SURGERY  2013    May 1 or 2, 2013   • COLONOSCOPY  01/21/2005   • COLONOSCOPY N/A 6/1/2017    Procedure: COLONOSCOPY;  Surgeon: Mat Jennings MD;  Location: [facility] ENDOSCOPY;  Service:    • ESOPHAGOSCOPY / EGD  02/25/1998    Mild esophagitis, mild gastritis, the most likely diagnosis for the esophageal inflammation is reflux esophagitis.   • EYE SURGERY      cataract, right eye   • FINGER SURGERY  04/07/1994    Partial amputation, right ring finger   • HYSTERECTOMY     • TUBAL ABDOMINAL LIGATION       Social History     Socioeconomic History   • Marital status:      Spouse name: Not on file   • Number of children: Not on file   • Years of education: Not on file   • Highest education level: Not on file   Occupational History   • Occupation: Retired     Comment: Grandson resides with patient.   Tobacco Use   • Smoking status: Current Every Day Smoker     Packs/day: 1.00     Years: 50.00     Pack years: 50.00     Types: Cigarettes   • Smokeless tobacco: Never Used   Substance and Sexual Activity   • Alcohol use: No   • Drug use: No   • Sexual activity: Defer     Family History   Problem Relation Age of Onset   • Diabetes Other    • Heart disease Other    • Hypertension Other    • Diabetes Mother    • Hypertension Father    • Heart attack Father    • Kidney failure Father    • No Known Problems Maternal Grandmother    • No Known Problems Maternal Grandfather    • No Known Problems Paternal Grandmother    • No Known Problems Paternal Grandfather        ALLERGIES:  Allergies   Allergen Reactions   • Morphine And Related  Shortness Of Breath     Dyspnea   • Morphine Sulfate Shortness Of Breath   • Morpholine Salicylate Shortness Of Breath     Morphine allergy, states couldn't breathe   • Prozac [Fluoxetine Hcl] Anxiety         Review of Systems   Constitution: Negative for chills, fever, malaise/fatigue and weight gain.   HENT: Negative for nosebleeds and tinnitus.    Eyes: Negative for blurred vision and double vision.   Cardiovascular: Positive for dyspnea on exertion. Negative for chest pain, irregular heartbeat, leg swelling, palpitations and syncope.   Respiratory: Positive for shortness of breath. Negative for cough, sleep disturbances due to breathing and snoring.    Endocrine: Negative for polydipsia, polyphagia and polyuria.   Hematologic/Lymphatic: Negative for bleeding problem. Does not bruise/bleed easily.   Skin: Negative for color change and suspicious lesions.   Musculoskeletal: Negative for falls and myalgias.   Gastrointestinal: Negative for bloating, heartburn and hematochezia.   Genitourinary: Negative for dysuria and hematuria.   Neurological: Negative for dizziness, headaches, seizures, vertigo and weakness.   Psychiatric/Behavioral: Positive for depression. Negative for altered mental status. The patient is nervous/anxious. The patient does not have insomnia.    Allergic/Immunologic: Negative for environmental allergies and persistent infections.       Current Outpatient Medications   Medication Sig Dispense Refill   • ACCU-CHEK KAYKAY PLUS test strip TEST EVERY DAY AS DIRECTED 100 each 11   • ALPRAZolam (XANAX) 0.25 MG tablet      • apixaban (ELIQUIS) 5 MG tablet tablet Take 1 tablet by mouth Every 12 (Twelve) Hours. 60 tablet 5   • B Complex Vitamins (VITAMIN B COMPLEX) capsule capsule Take 1 capsule by mouth daily.     • B-D UF III MINI PEN NEEDLES 31G X 5 MM misc USE ONCE DAILY 100 each 12   • betamethasone dipropionate (DIPROLENE) 0.05 % ointment Apply  topically 2 (Two) Times a Day. 45 g 2   •  cholecalciferol (VITAMIN D3) 1000 UNITS tablet Take 1,000 Units by mouth daily.     • ferrous sulfate 325 (65 FE) MG tablet Take 1 tablet by mouth 2 (Two) Times a Day. 60 tablet 5   • Fluticasone Furoate-Vilanterol (BREO ELLIPTA IN) Inhale.     • Insulin Glargine (BASAGLAR KWIKPEN) 100 UNIT/ML injection pen Inject 50 Units under the skin into the appropriate area as directed Every Night. 15 mL 5   • lisinopril (PRINIVIL,ZESTRIL) 5 MG tablet TAKE ONE-HALF TABLET EVERY DAY 90 tablet 0   • lovastatin (MEVACOR) 40 MG tablet Take 1 tablet by mouth Every Night. 90 tablet 2   • metFORMIN (GLUCOPHAGE) 1000 MG tablet TAKE 1 TABLET TWICE DAILY WITH MEALS 180 tablet 1   • metoprolol tartrate (LOPRESSOR) 25 MG tablet Take 1 tablet by mouth 2 (Two) Times a Day. 60 tablet 11   • Mirabegron ER (Myrbetriq) 25 MG tablet sustained-release 24 hour 24 hr tablet Take 1 tablet by mouth Daily. 30 tablet 3   • Omega-3 Fatty Acids (FISH OIL) 1000 MG capsule capsule Take 1 capsule by mouth 2 (Two) Times a Day With Meals. 60 capsule 5   • omeprazole (priLOSEC) 40 MG capsule TAKE 1 CAPSULE BY MOUTH EVERY DAY 30 capsule 1   • traZODone (DESYREL) 50 MG tablet TAKE 1 TABLET EVERY NIGHT 90 tablet 1   • venlafaxine XR (EFFEXOR-XR) 75 MG 24 hr capsule TAKE 1 CAPSULE EVERY DAY 90 capsule 3   • albuterol sulfate HFA (Ventolin HFA) 108 (90 Base) MCG/ACT inhaler Inhale 2 puffs Every 6 (Six) Hours As Needed for Wheezing or Shortness of Air. Indications: Chronic Obstructive Lung Disease 18 g 5     No current facility-administered medications for this visit.        OBJECTIVE:    Physical Exam:   Physical Exam   Constitutional: She is oriented to person, place, and time. Vital signs are normal. She appears well-developed and well-nourished.  Non-toxic appearance. She does not have a sickly appearance. No distress.   HENT:   Head: Normocephalic and atraumatic.   Right Ear: External ear normal.   Left Ear: External ear normal.   Eyes: Conjunctivae and lids  "are normal.   Neck: Normal range of motion. Neck supple. No JVD present.   Cardiovascular: Normal rate, regular rhythm, S1 normal, S2 normal, normal heart sounds, intact distal pulses and normal pulses. PMI is not displaced. Exam reveals no gallop, no S3, no S4 and no friction rub.   No murmur heard.  Pulmonary/Chest: Effort normal. No respiratory distress. She has no decreased breath sounds. She has wheezes. She has no rales. She exhibits no tenderness.   Abdominal: Soft. Normal appearance and bowel sounds are normal. She exhibits no distension. There is no abdominal tenderness.   Musculoskeletal:         General: No edema (Chronic venous stasis changes to bilateral lower extremities).   Neurological: She is alert and oriented to person, place, and time. Gait normal.   Skin: Skin is warm and dry. No rash noted. She is not diaphoretic. No erythema. No pallor.   Psychiatric: She has a normal mood and affect. Her behavior is normal. Judgment and thought content normal.   Vitals reviewed.    Vitals:    09/24/20 1303   BP: 118/70   BP Location: Left arm   Patient Position: Sitting   Cuff Size: Adult   Pulse: 68   SpO2: 94%   Weight: 101 kg (222 lb 9.6 oz)   Height: 162.6 cm (64\")       DATA REVIEWED:   Results for orders placed during the hospital encounter of 09/22/20   Adult Transthoracic Echo Complete W/ Cont if Necessary Per Protocol    Narrative · Left ventricular wall thickness is consistent with concentric   hypertrophy. Left ventricular diastolic function is consistent with (grade   I) impaired relaxation.  · Estimated EF = 53% Ejection fraction appears to be 51 - 55%.  · Trace to mild mitral valve regurgitation is present.  · Estimated right ventricular systolic pressure from tricuspid   regurgitation is mildly elevated (35-45 mmHg).          Us Carotid Bilateral    Result Date: 9/4/2020  CONCLUSION:  Mild to moderate atherosclerotic disease in the region of the bilateral bulb/internal carotid arteries. No " significant flow-limiting stenosis by velocity measurements. Electronically signed by:  Ramy Allen MD  9/4/2020 4:14 PM CDT Workstation: [id number]    CXR:     CT:     Labs: BMP, CBC, LIPID, TSH  Lab Results   Component Value Date    GLUCOSE 152 (H) 09/04/2020    CALCIUM 9.4 09/04/2020     09/04/2020    K 4.2 09/04/2020    CO2 21.9 (L) 09/04/2020     09/04/2020    BUN 14 09/04/2020    CREATININE 0.67 09/04/2020    EGFRIFNONA 87 09/04/2020    BCR 20.9 09/04/2020    ANIONGAP 13.1 09/04/2020     Lab Results   Component Value Date    WBC 8.94 09/04/2020    HGB 12.8 09/04/2020    HCT 37.9 09/04/2020    MCV 86.3 09/04/2020     09/04/2020     Lab Results   Component Value Date    CHOL 184 09/04/2020    CHOL 168 05/07/2019    CHOL 170 04/02/2018     Lab Results   Component Value Date    TRIG 195 (H) 09/04/2020    TRIG 117 05/07/2019    TRIG 119 04/02/2018     Lab Results   Component Value Date    HDL 51 09/04/2020    HDL 48 05/07/2019    HDL 48 (L) 04/02/2018     No components found for: LDLCALC  Lab Results   Component Value Date    LDL 94 09/04/2020    LDL 97 05/07/2019    LDL 83 04/02/2018     No results found for: HDLLDLRATIO  No components found for: CHOLHDL  Lab Results   Component Value Date    TSH 1.980 09/04/2020     No results found for: PROBNP  EKG:             TTE:     Left Ventricle Estimated EF = 53% Left ventricular ejection fraction appears to be 51 - 55%. Left ventricular systolic function is normal. Normal left ventricular cavity size noted. Left ventricular wall thickness is consistent with borderline concentric hypertrophy. All left ventricular wall segments contract normally. Left ventricular diastolic function is consistent with (grade I) impaired relaxation.   Right Ventricle Normal right ventricular cavity size, wall thickness, systolic function and septal motion noted.   Left Atrium Normal left atrial size and volume noted.   Right Atrium Normal right atrial size noted.      Aortic Valve The aortic valve is abnormal in structure. The aortic valve exhibits sclerosis. The aortic valve appears trileaflet. Trace aortic valve regurgitation is present. No hemodynamically significant aortic valve stenosis is present.   Mitral Valve The mitral valve is structurally normal with no significant stenosis present. Trace to mild mitral valve regurgitation is present.   Tricuspid Valve The tricuspid valve is structurally normal with no significant regurgitation or significant stenosis present. Estimated right ventricular systolic pressure from tricuspid regurgitation is mildly elevated (35-45 mmHg).   Pulmonic Valve The pulmonic valve is structurally normal with no regurgitation or significant stenosis present.   Greater Vessels No dilation of the aortic root is present.   Pericardium The pericardium is normal. There is no evidence of pericardial effusion.     Holter:     Sinus rhythm with intermittent atrial tachycardia/atrial flutter with average heart rate 95 minimum 65 to maximum 130 bpm.  Rare isolated premature supraventricular tachycardia be representing less than 1% no PVCs noted.  No significant bradyarrhythmias or pauses noted.     Impression     Sinus rhythm with intermittent tachycardia with good average heart rate without significant bradyarrhythmias or pauses noted     Atrial tachycardia at a rate of 130 bpm however the possibility of atrial flutter cannot be excluded     #3 rare isolated premature supraventricular ectopic beat no PVCs noted.      Carotid duplex:     Mild to moderate atherosclerotic disease in the region of the  bilateral bulb/internal carotid arteries.   No significant flow-limiting stenosis by velocity measurements.     Electronically signed by:  Ramy Allen MD  9/4/2020 4:14 PM CDT  Workstation: [id number]    Stress tests:     C:      The following portions of the patient's history were reviewed and updated as appropriate: allergies, current medications, past  family history, past medical history, past social history, past surgical history and problem list.  Old records reviewed and pertinent information is included in the above objective data.     ASSESSMENT/PLAN:       Diagnosis Plan   1. Abnormal EKG/ RBBB/bifascicular block Continue to monitor with EKG's   QRS Dur: 126ms     2. Dyspnea on exertion  Dyspnea. Etiology is likely multifactorial due to deconditioning, obesity, JENA, COPD.    -Echocardiogram showing normal biventricular function  -Referral placed to pulmonologist.  Appreciate his help in the management.  PRN albuterol inhaler was started, patient instructed on usage.  Did trial Brio inhaler patient states this was ineffective we will leave further inhaler twice up to pulmonologist.  -Did speak with patient in detail about the importance of smoking cessation.  -Because she has continued ongoing dyspnea, several cardiac risk factors including hypertension, hyperlipidemia, arrhythmias, tobacco usage recommend patient undergo CTA coronary.Risk/benefits discussed with patient.     3.      Paroxysmal atrial flutter Atrial flutter paroxysmal - 7 days or less NEW.  CHADSVASC: HTN, Age >65, DM, TIA and Female  EF is 51-55%. mild evidence for LVH. LA size is normal.    Rate is controlled at this time    Anticoagulation:ordered  - Eliquis 5mg BID    Rate control agents:ordered  - Metoprolol tartrate 25mg BID    Rhythm control agents:not indicated       4. Essential hypertension  Chronic, improved.  BP noted to be normal today and improved  DASH; medication compliance  Continue current management with Lisinopril 5mg   Continue metoprolol tartrate 25mg BID, this has greatly improved b/p and HR       5. Mixed hyperlipidemia   Chronic. stable.  Dyslipidemia under excellent control.  Statin:  Yes.  Lovastatin 40mg   Smoking cessation     6. Tobacco dependence Michelle Pierce  reports that she has been smoking cigarettes. She has a 50.00 pack-year smoking history. She has  never used smokeless tobacco.. I have educated her on the risk of diseases from using tobacco products such as cancer, COPD and heart diease.     I advised her to quit and she is not willing to quit.    I spent 3  minutes counseling the patient.       7. Obesity Patient's Body mass index is 38.21 kg/m². BMI is above normal parameters. Recommendations include: educational material, exercise counseling and nutrition counseling.     8. Type II DM Lab Results   Component Value Date    HGBA1C 7.42 (H) 09/04/2020     Chronic, stable. Management per PCP.     9. COPD Albuterol inhaler started. Referral placed to pulmonology for PFT and further eval/tx     10. JENA with CPAP  Chronic, stable.  -Avoid ETOH, weight reduction  -Treatment of HTN  -Patient is CPAP compliant and follows routinely with sleep medicine.     11.  Bilateral carotid artery stenosis  mild to moderate atherosclerotic disease bilaterally.  No significant flow-limiting stenosis.  Antegrade flow present.  -Recommend repeat carotid ultrasound in 1 year.   -Continue statin.  -Start ASA 81mg daily.        Follow up: 6 months    45 minutes out of 60 minutes face to face spent in counseling/coordination of care as relates to presentation of aflutter, dyspnea, htn, obesity with dietary and lifestyle modifications/procedures as outlined above.                This document has been electronically signed by MAMTA Boyce on September 24, 2020 16:05 CDT

## 2020-10-12 NOTE — TELEPHONE ENCOUNTER
"----- Message from Miguelina Iqbal sent at 10/9/2020  4:42 PM CDT -----  She left a vm, all she said is she needs to clarify if she is take this \"this\" medicine or not and to plese call her     Her number is [phone]    Thanks    Miguelina       Patients phone call returned. Questions were answered regarding taking ASA. Yes she should continue taking 81mg ASA.                   This document has been electronically signed by MAMTA Boyce on October 12, 2020 08:46 CDT       "

## 2020-12-26 PROBLEM — R50.9 FEVER: Status: ACTIVE | Noted: 2020-01-01

## 2020-12-27 PROBLEM — R78.81 STREPTOCOCCAL BACTEREMIA: Status: ACTIVE | Noted: 2020-01-01

## 2020-12-27 PROBLEM — R59.0 LYMPHADENOPATHY, MEDIASTINAL: Status: ACTIVE | Noted: 2020-01-01

## 2020-12-27 PROBLEM — B95.5 STREPTOCOCCAL BACTEREMIA: Status: ACTIVE | Noted: 2020-01-01

## 2020-12-27 PROBLEM — J18.9 CAP (COMMUNITY ACQUIRED PNEUMONIA): Status: ACTIVE | Noted: 2020-01-01

## 2020-12-28 PROBLEM — I38 ENDOCARDITIS: Status: ACTIVE | Noted: 2020-01-01

## 2020-12-28 PROBLEM — I35.8 ENDOCARDITIS OF AORTIC VALVE: Status: ACTIVE | Noted: 2020-01-01

## 2020-12-31 NOTE — ANESTHESIA PREPROCEDURE EVALUATION
Anesthesia Evaluation     Patient summary reviewed and Nursing notes reviewed                Airway   Possible difficult intubation  Dental      Pulmonary - normal exam   (+) pneumonia , COPD, shortness of breath,   Cardiovascular - normal exam    (+) hypertension, dysrhythmias, hyperlipidemia,  carotid artery disease      Neuro/Psych  GI/Hepatic/Renal/Endo    (+) obesity, morbid obesity, GERD,  liver disease, diabetes mellitus,     Musculoskeletal     Abdominal   (+) obese,    Substance History      OB/GYN          Other   arthritis,    history of cancer                    Anesthesia Plan    ASA 3     MAC     intravenous induction     Anesthetic plan, all risks, benefits, and alternatives have been provided, discussed and informed consent has been obtained with: patient.    Plan discussed with CRNA.

## 2020-12-31 NOTE — ANESTHESIA POSTPROCEDURE EVALUATION
Patient: Michelle Pierce    Procedure Summary     Date: 12/31/20 Room / Location: [facility] ENDOSCOPY 2 / [facility] ENDOSCOPY    Anesthesia Start: 1103 Anesthesia Stop: 1151    Procedures:       ESOPHAGOGASTRODUODENOSCOPY WITH CONTROL OF BLEED (N/A )      COLONOSCOPY WITH BIOPSY (N/A ) Diagnosis:       Streptococcal bacteremia      Gastroesophageal reflux disease without esophagitis      (Streptococcal bacteremia [R78.81, B95.5])      (Gastroesophageal reflux disease without esophagitis [K21.9])    Surgeon: Brendon Gramajo DO Provider: Gerry Murillo CRNA    Anesthesia Type: MAC ASA Status: 3          Anesthesia Type: MAC    Vitals  No vitals data found for the desired time range.          Post Anesthesia Care and Evaluation    Patient location during evaluation: PHASE II  Patient participation: complete - patient participated  Level of consciousness: awake and alert  Pain score: 0  Pain management: adequate  Airway patency: patent  Anesthetic complications: No anesthetic complications  PONV Status: none  Cardiovascular status: acceptable  Respiratory status: acceptable  Hydration status: acceptable  Post Neuraxial Block status: Motor and sensory function returned to baseline

## 2021-01-01 ENCOUNTER — APPOINTMENT (OUTPATIENT)
Dept: GENERAL RADIOLOGY | Facility: HOSPITAL | Age: 76
End: 2021-01-01

## 2021-01-01 ENCOUNTER — TELEPHONE (OUTPATIENT)
Dept: FAMILY MEDICINE CLINIC | Facility: CLINIC | Age: 76
End: 2021-01-01

## 2021-01-01 ENCOUNTER — HOSPITAL ENCOUNTER (INPATIENT)
Facility: HOSPITAL | Age: 76
LOS: 5 days | Discharge: HOSPICE | End: 2021-03-30
Attending: EMERGENCY MEDICINE | Admitting: FAMILY MEDICINE

## 2021-01-01 ENCOUNTER — OFFICE VISIT (OUTPATIENT)
Dept: FAMILY MEDICINE CLINIC | Facility: CLINIC | Age: 76
End: 2021-01-01

## 2021-01-01 ENCOUNTER — LAB REQUISITION (OUTPATIENT)
Dept: LAB | Facility: HOSPITAL | Age: 76
End: 2021-01-01

## 2021-01-01 ENCOUNTER — TELEMEDICINE (OUTPATIENT)
Dept: FAMILY MEDICINE CLINIC | Facility: CLINIC | Age: 76
End: 2021-01-01

## 2021-01-01 ENCOUNTER — OFFICE VISIT (OUTPATIENT)
Dept: CARDIOLOGY | Facility: CLINIC | Age: 76
End: 2021-01-01

## 2021-01-01 ENCOUNTER — HOSPITAL ENCOUNTER (OUTPATIENT)
Dept: ULTRASOUND IMAGING | Facility: HOSPITAL | Age: 76
Discharge: HOME | End: 2021-01-29

## 2021-01-01 ENCOUNTER — BULK ORDERING (OUTPATIENT)
Dept: CASE MANAGEMENT | Facility: OTHER | Age: 76
End: 2021-01-01

## 2021-01-01 ENCOUNTER — APPOINTMENT (OUTPATIENT)
Dept: MRI IMAGING | Facility: HOSPITAL | Age: 76
End: 2021-01-01

## 2021-01-01 ENCOUNTER — APPOINTMENT (OUTPATIENT)
Dept: INTERVENTIONAL RADIOLOGY/VASCULAR | Facility: HOSPITAL | Age: 76
End: 2021-01-01

## 2021-01-01 ENCOUNTER — APPOINTMENT (OUTPATIENT)
Dept: CT IMAGING | Facility: HOSPITAL | Age: 76
End: 2021-01-01

## 2021-01-01 ENCOUNTER — READMISSION MANAGEMENT (OUTPATIENT)
Dept: CALL CENTER | Facility: HOSPITAL | Age: 76
End: 2021-01-01

## 2021-01-01 ENCOUNTER — APPOINTMENT (OUTPATIENT)
Dept: ULTRASOUND IMAGING | Facility: HOSPITAL | Age: 76
End: 2021-01-01

## 2021-01-01 ENCOUNTER — TELEPHONE (OUTPATIENT)
Dept: CARDIOLOGY | Facility: CLINIC | Age: 76
End: 2021-01-01

## 2021-01-01 ENCOUNTER — TRANSITIONAL CARE MANAGEMENT TELEPHONE ENCOUNTER (OUTPATIENT)
Dept: CALL CENTER | Facility: HOSPITAL | Age: 76
End: 2021-01-01

## 2021-01-01 VITALS
HEIGHT: 64 IN | BODY MASS INDEX: 37.69 KG/M2 | SYSTOLIC BLOOD PRESSURE: 128 MMHG | WEIGHT: 220.8 LBS | HEART RATE: 91 BPM | RESPIRATION RATE: 18 BRPM | DIASTOLIC BLOOD PRESSURE: 70 MMHG | TEMPERATURE: 97.1 F

## 2021-01-01 VITALS
SYSTOLIC BLOOD PRESSURE: 122 MMHG | OXYGEN SATURATION: 92 % | HEART RATE: 93 BPM | WEIGHT: 220 LBS | HEIGHT: 64 IN | DIASTOLIC BLOOD PRESSURE: 70 MMHG | BODY MASS INDEX: 37.56 KG/M2

## 2021-01-01 VITALS
HEIGHT: 64 IN | DIASTOLIC BLOOD PRESSURE: 64 MMHG | SYSTOLIC BLOOD PRESSURE: 120 MMHG | TEMPERATURE: 97 F | RESPIRATION RATE: 18 BRPM | WEIGHT: 217 LBS | BODY MASS INDEX: 37.05 KG/M2 | OXYGEN SATURATION: 99 % | HEART RATE: 74 BPM

## 2021-01-01 VITALS
TEMPERATURE: 97.1 F | RESPIRATION RATE: 20 BRPM | SYSTOLIC BLOOD PRESSURE: 120 MMHG | OXYGEN SATURATION: 94 % | BODY MASS INDEX: 39.86 KG/M2 | DIASTOLIC BLOOD PRESSURE: 83 MMHG | WEIGHT: 216.6 LBS | HEIGHT: 62 IN | HEART RATE: 81 BPM

## 2021-01-01 VITALS
HEART RATE: 66 BPM | WEIGHT: 217.4 LBS | HEIGHT: 64 IN | BODY MASS INDEX: 37.11 KG/M2 | SYSTOLIC BLOOD PRESSURE: 116 MMHG | OXYGEN SATURATION: 97 % | TEMPERATURE: 97.8 F | DIASTOLIC BLOOD PRESSURE: 65 MMHG | RESPIRATION RATE: 16 BRPM

## 2021-01-01 VITALS
SYSTOLIC BLOOD PRESSURE: 124 MMHG | WEIGHT: 220 LBS | DIASTOLIC BLOOD PRESSURE: 78 MMHG | BODY MASS INDEX: 37.56 KG/M2 | HEART RATE: 77 BPM | OXYGEN SATURATION: 92 % | HEIGHT: 64 IN

## 2021-01-01 DIAGNOSIS — R50.9 FEVER, UNSPECIFIED FEVER CAUSE: ICD-10-CM

## 2021-01-01 DIAGNOSIS — I65.23 BILATERAL CAROTID ARTERY STENOSIS: ICD-10-CM

## 2021-01-01 DIAGNOSIS — R91.8 LUNG INFILTRATE: ICD-10-CM

## 2021-01-01 DIAGNOSIS — I35.8 ENDOCARDITIS OF AORTIC VALVE: ICD-10-CM

## 2021-01-01 DIAGNOSIS — R22.0 PREAURICULAR MASS: ICD-10-CM

## 2021-01-01 DIAGNOSIS — R59.0 LYMPHADENOPATHY, MEDIASTINAL: Primary | ICD-10-CM

## 2021-01-01 DIAGNOSIS — Z74.09 IMPAIRED MOBILITY AND ADLS: ICD-10-CM

## 2021-01-01 DIAGNOSIS — Z74.09 IMPAIRED FUNCTIONAL MOBILITY, BALANCE, GAIT, AND ENDURANCE: ICD-10-CM

## 2021-01-01 DIAGNOSIS — J96.01 ACUTE HYPOXEMIC RESPIRATORY FAILURE (HCC): ICD-10-CM

## 2021-01-01 DIAGNOSIS — R59.0 MEDIASTINAL LYMPHADENOPATHY: ICD-10-CM

## 2021-01-01 DIAGNOSIS — I33.0 ACUTE AND SUBACUTE INFECTIVE ENDOCARDITIS: ICD-10-CM

## 2021-01-01 DIAGNOSIS — J44.9 CHRONIC OBSTRUCTIVE PULMONARY DISEASE, UNSPECIFIED COPD TYPE (HCC): ICD-10-CM

## 2021-01-01 DIAGNOSIS — R65.20 PUERPERAL SEPSIS WITH ACUTE HYPOXIC RESPIRATORY FAILURE WITHOUT SEPTIC SHOCK (HCC): Primary | ICD-10-CM

## 2021-01-01 DIAGNOSIS — G47.33 OBSTRUCTIVE SLEEP APNEA SYNDROME: ICD-10-CM

## 2021-01-01 DIAGNOSIS — Z79.4 DIABETES MELLITUS, TYPE II, INSULIN DEPENDENT (HCC): Primary | ICD-10-CM

## 2021-01-01 DIAGNOSIS — M25.561 PAIN IN BOTH KNEES, UNSPECIFIED CHRONICITY: ICD-10-CM

## 2021-01-01 DIAGNOSIS — Z79.4 DIABETES MELLITUS, TYPE II, INSULIN DEPENDENT (HCC): ICD-10-CM

## 2021-01-01 DIAGNOSIS — F51.01 PRIMARY INSOMNIA: ICD-10-CM

## 2021-01-01 DIAGNOSIS — Z23 IMMUNIZATION DUE: ICD-10-CM

## 2021-01-01 DIAGNOSIS — R78.81 STREPTOCOCCAL BACTEREMIA: ICD-10-CM

## 2021-01-01 DIAGNOSIS — E11.9 DIABETES MELLITUS, TYPE II, INSULIN DEPENDENT (HCC): Primary | ICD-10-CM

## 2021-01-01 DIAGNOSIS — Z51.5 HOSPICE CARE: ICD-10-CM

## 2021-01-01 DIAGNOSIS — J18.9 COMMUNITY ACQUIRED PNEUMONIA, UNSPECIFIED LATERALITY: ICD-10-CM

## 2021-01-01 DIAGNOSIS — G89.29 CHRONIC BILATERAL LOW BACK PAIN WITHOUT SCIATICA: ICD-10-CM

## 2021-01-01 DIAGNOSIS — G62.9 NEUROPATHY: ICD-10-CM

## 2021-01-01 DIAGNOSIS — F41.9 ANXIETY: ICD-10-CM

## 2021-01-01 DIAGNOSIS — I10 ESSENTIAL HYPERTENSION: ICD-10-CM

## 2021-01-01 DIAGNOSIS — M17.0 PRIMARY OSTEOARTHRITIS OF BOTH KNEES: ICD-10-CM

## 2021-01-01 DIAGNOSIS — Z45.2 ENCOUNTER FOR ADJUSTMENT AND MANAGEMENT OF VASCULAR ACCESS DEVICE: ICD-10-CM

## 2021-01-01 DIAGNOSIS — E11.9 DIABETES MELLITUS, TYPE II, INSULIN DEPENDENT (HCC): ICD-10-CM

## 2021-01-01 DIAGNOSIS — B95.4 OTHER STREPTOCOCCUS AS THE CAUSE OF DISEASES CLASSIFIED ELSEWHERE: ICD-10-CM

## 2021-01-01 DIAGNOSIS — M43.10 SPONDYLOLISTHESIS, ACQUIRED: ICD-10-CM

## 2021-01-01 DIAGNOSIS — E78.2 MIXED HYPERLIPIDEMIA: ICD-10-CM

## 2021-01-01 DIAGNOSIS — Z09 HOSPITAL DISCHARGE FOLLOW-UP: Primary | ICD-10-CM

## 2021-01-01 DIAGNOSIS — I45.10 RBBB: ICD-10-CM

## 2021-01-01 DIAGNOSIS — Z79.2 LONG TERM (CURRENT) USE OF ANTIBIOTICS: ICD-10-CM

## 2021-01-01 DIAGNOSIS — R22.32 ARM MASS, LEFT: Primary | ICD-10-CM

## 2021-01-01 DIAGNOSIS — I48.92 ATRIAL FLUTTER, PAROXYSMAL (HCC): ICD-10-CM

## 2021-01-01 DIAGNOSIS — K21.9 GASTROESOPHAGEAL REFLUX DISEASE WITHOUT ESOPHAGITIS: ICD-10-CM

## 2021-01-01 DIAGNOSIS — M54.50 CHRONIC BILATERAL LOW BACK PAIN WITHOUT SCIATICA: Primary | ICD-10-CM

## 2021-01-01 DIAGNOSIS — A41.9 SEPSIS, DUE TO UNSPECIFIED ORGANISM, UNSPECIFIED WHETHER ACUTE ORGAN DYSFUNCTION PRESENT (HCC): ICD-10-CM

## 2021-01-01 DIAGNOSIS — I10 ESSENTIAL HYPERTENSION: Primary | ICD-10-CM

## 2021-01-01 DIAGNOSIS — I48.92 ATRIAL FLUTTER, PAROXYSMAL (HCC): Primary | ICD-10-CM

## 2021-01-01 DIAGNOSIS — G89.29 CHRONIC BILATERAL LOW BACK PAIN WITHOUT SCIATICA: Primary | ICD-10-CM

## 2021-01-01 DIAGNOSIS — R91.8 MASS OF UPPER LOBE OF RIGHT LUNG: ICD-10-CM

## 2021-01-01 DIAGNOSIS — R06.09 DYSPNEA ON EXERTION: ICD-10-CM

## 2021-01-01 DIAGNOSIS — Z51.5 HOSPICE CARE: Primary | ICD-10-CM

## 2021-01-01 DIAGNOSIS — Z78.9 IMPAIRED MOBILITY AND ADLS: ICD-10-CM

## 2021-01-01 DIAGNOSIS — J96.01 PUERPERAL SEPSIS WITH ACUTE HYPOXIC RESPIRATORY FAILURE WITHOUT SEPTIC SHOCK (HCC): Primary | ICD-10-CM

## 2021-01-01 DIAGNOSIS — R59.0 LYMPHADENOPATHY, MEDIASTINAL: ICD-10-CM

## 2021-01-01 DIAGNOSIS — F17.200 TOBACCO DEPENDENCE: ICD-10-CM

## 2021-01-01 DIAGNOSIS — F33.0 MILD EPISODE OF RECURRENT MAJOR DEPRESSIVE DISORDER (HCC): ICD-10-CM

## 2021-01-01 DIAGNOSIS — M25.562 PAIN IN BOTH KNEES, UNSPECIFIED CHRONICITY: ICD-10-CM

## 2021-01-01 DIAGNOSIS — E08.628 DIABETES MELLITUS DUE TO UNDERLYING CONDITION WITH OTHER SKIN COMPLICATIONS (HCC): ICD-10-CM

## 2021-01-01 DIAGNOSIS — Z74.09 IMPAIRED MOBILITY AND ACTIVITIES OF DAILY LIVING: ICD-10-CM

## 2021-01-01 DIAGNOSIS — M53.2X6 SPINAL INSTABILITY, LUMBAR: ICD-10-CM

## 2021-01-01 DIAGNOSIS — N32.81 OVERACTIVE BLADDER: ICD-10-CM

## 2021-01-01 DIAGNOSIS — B37.0 THRUSH: ICD-10-CM

## 2021-01-01 DIAGNOSIS — R09.02 HYPOXIA: ICD-10-CM

## 2021-01-01 DIAGNOSIS — J18.9 PNEUMONIA OF RIGHT LUNG DUE TO INFECTIOUS ORGANISM, UNSPECIFIED PART OF LUNG: ICD-10-CM

## 2021-01-01 DIAGNOSIS — B95.5 STREPTOCOCCAL BACTEREMIA: ICD-10-CM

## 2021-01-01 DIAGNOSIS — C44.90 SKIN CANCER: ICD-10-CM

## 2021-01-01 DIAGNOSIS — M54.50 CHRONIC BILATERAL LOW BACK PAIN WITHOUT SCIATICA: ICD-10-CM

## 2021-01-01 DIAGNOSIS — R22.32 ARM MASS, LEFT: ICD-10-CM

## 2021-01-01 DIAGNOSIS — Z78.9 IMPAIRED MOBILITY AND ACTIVITIES OF DAILY LIVING: ICD-10-CM

## 2021-01-01 LAB
ALBUMIN SERPL-MCNC: 3.2 G/DL (ref 3.5–5.2)
ALBUMIN SERPL-MCNC: 3.3 G/DL (ref 3.5–5.2)
ALBUMIN SERPL-MCNC: 3.4 G/DL (ref 3.5–5.2)
ALBUMIN SERPL-MCNC: 3.4 G/DL (ref 3.5–5.2)
ALBUMIN SERPL-MCNC: 3.6 G/DL (ref 3.5–5.2)
ALBUMIN SERPL-MCNC: 3.7 G/DL (ref 3.5–5.2)
ALBUMIN SERPL-MCNC: 3.7 G/DL (ref 3.5–5.2)
ALBUMIN SERPL-MCNC: 3.8 G/DL (ref 3.5–5.2)
ALBUMIN/GLOB SERPL: 0.9 G/DL
ALBUMIN/GLOB SERPL: 1 G/DL
ALBUMIN/GLOB SERPL: 1.1 G/DL
ALBUMIN/GLOB SERPL: 1.3 G/DL
ALP SERPL-CCNC: 67 U/L (ref 39–117)
ALP SERPL-CCNC: 69 U/L (ref 39–117)
ALP SERPL-CCNC: 69 U/L (ref 39–117)
ALP SERPL-CCNC: 71 U/L (ref 39–117)
ALP SERPL-CCNC: 73 U/L (ref 39–117)
ALP SERPL-CCNC: 75 U/L (ref 39–117)
ALP SERPL-CCNC: 82 U/L (ref 39–117)
ALP SERPL-CCNC: 85 U/L (ref 39–117)
ALP SERPL-CCNC: 86 U/L (ref 39–117)
ALP SERPL-CCNC: 92 U/L (ref 39–117)
ALP SERPL-CCNC: 93 U/L (ref 39–117)
ALT SERPL W P-5'-P-CCNC: 10 U/L (ref 1–33)
ALT SERPL W P-5'-P-CCNC: 13 U/L (ref 1–33)
ALT SERPL W P-5'-P-CCNC: 14 U/L (ref 1–33)
ALT SERPL W P-5'-P-CCNC: 16 U/L (ref 1–33)
ALT SERPL W P-5'-P-CCNC: 8 U/L (ref 1–33)
ALT SERPL W P-5'-P-CCNC: 8 U/L (ref 1–33)
ALT SERPL W P-5'-P-CCNC: 9 U/L (ref 1–33)
ANION GAP SERPL CALCULATED.3IONS-SCNC: 11 MMOL/L (ref 5–15)
ANION GAP SERPL CALCULATED.3IONS-SCNC: 12 MMOL/L (ref 5–15)
ANION GAP SERPL CALCULATED.3IONS-SCNC: 13 MMOL/L (ref 5–15)
ANION GAP SERPL CALCULATED.3IONS-SCNC: 14 MMOL/L (ref 5–15)
ANION GAP SERPL CALCULATED.3IONS-SCNC: 15 MMOL/L (ref 5–15)
ANION GAP SERPL CALCULATED.3IONS-SCNC: 17 MMOL/L (ref 5–15)
ANION GAP SERPL CALCULATED.3IONS-SCNC: 7 MMOL/L (ref 5–15)
ANION GAP SERPL CALCULATED.3IONS-SCNC: 8 MMOL/L (ref 5–15)
ANION GAP SERPL CALCULATED.3IONS-SCNC: 9 MMOL/L (ref 5–15)
ANION GAP SERPL CALCULATED.3IONS-SCNC: 9 MMOL/L (ref 5–15)
AST SERPL-CCNC: 12 U/L (ref 1–32)
AST SERPL-CCNC: 14 U/L (ref 1–32)
AST SERPL-CCNC: 15 U/L (ref 1–32)
AST SERPL-CCNC: 16 U/L (ref 1–32)
AST SERPL-CCNC: 17 U/L (ref 1–32)
AST SERPL-CCNC: 18 U/L (ref 1–32)
AST SERPL-CCNC: 9 U/L (ref 1–32)
B PARAPERT DNA SPEC QL NAA+PROBE: NOT DETECTED
B PERT DNA SPEC QL NAA+PROBE: NOT DETECTED
BACTERIA SPEC AEROBE CULT: NORMAL
BACTERIA UR QL AUTO: ABNORMAL /HPF
BASOPHILS # BLD AUTO: 0.01 10*3/MM3 (ref 0–0.2)
BASOPHILS # BLD AUTO: 0.02 10*3/MM3 (ref 0–0.2)
BASOPHILS # BLD AUTO: 0.03 10*3/MM3 (ref 0–0.2)
BASOPHILS # BLD AUTO: 0.04 10*3/MM3 (ref 0–0.2)
BASOPHILS # BLD AUTO: 0.05 10*3/MM3 (ref 0–0.2)
BASOPHILS # BLD AUTO: 0.05 10*3/MM3 (ref 0–0.2)
BASOPHILS # BLD AUTO: 0.06 10*3/MM3 (ref 0–0.2)
BASOPHILS # BLD AUTO: 0.07 10*3/MM3 (ref 0–0.2)
BASOPHILS # BLD MANUAL: 0.12 10*3/MM3 (ref 0–0.2)
BASOPHILS NFR BLD AUTO: 0.1 % (ref 0–1.5)
BASOPHILS NFR BLD AUTO: 0.2 % (ref 0–1.5)
BASOPHILS NFR BLD AUTO: 0.4 % (ref 0–1.5)
BASOPHILS NFR BLD AUTO: 0.4 % (ref 0–1.5)
BASOPHILS NFR BLD AUTO: 0.5 % (ref 0–1.5)
BASOPHILS NFR BLD AUTO: 0.6 % (ref 0–1.5)
BASOPHILS NFR BLD AUTO: 0.7 % (ref 0–1.5)
BASOPHILS NFR BLD AUTO: 0.8 % (ref 0–1.5)
BASOPHILS NFR BLD AUTO: 1 % (ref 0–1.5)
BILIRUB SERPL-MCNC: 0.2 MG/DL (ref 0–1.2)
BILIRUB SERPL-MCNC: <0.2 MG/DL (ref 0–1.2)
BILIRUB UR QL STRIP: NEGATIVE
BUN SERPL-MCNC: 10 MG/DL (ref 8–23)
BUN SERPL-MCNC: 10 MG/DL (ref 8–23)
BUN SERPL-MCNC: 11 MG/DL (ref 8–23)
BUN SERPL-MCNC: 11 MG/DL (ref 8–23)
BUN SERPL-MCNC: 12 MG/DL (ref 8–23)
BUN SERPL-MCNC: 13 MG/DL (ref 8–23)
BUN SERPL-MCNC: 13 MG/DL (ref 8–23)
BUN SERPL-MCNC: 14 MG/DL (ref 8–23)
BUN SERPL-MCNC: 14 MG/DL (ref 8–23)
BUN SERPL-MCNC: 15 MG/DL (ref 8–23)
BUN SERPL-MCNC: 20 MG/DL (ref 8–23)
BUN SERPL-MCNC: 20 MG/DL (ref 8–23)
BUN SERPL-MCNC: 22 MG/DL (ref 8–23)
BUN/CREAT SERPL: 14.1 (ref 7–25)
BUN/CREAT SERPL: 15.5 (ref 7–25)
BUN/CREAT SERPL: 15.9 (ref 7–25)
BUN/CREAT SERPL: 16.9 (ref 7–25)
BUN/CREAT SERPL: 17.4 (ref 7–25)
BUN/CREAT SERPL: 17.8 (ref 7–25)
BUN/CREAT SERPL: 17.9 (ref 7–25)
BUN/CREAT SERPL: 18.3 (ref 7–25)
BUN/CREAT SERPL: 19.1 (ref 7–25)
BUN/CREAT SERPL: 20.6 (ref 7–25)
BUN/CREAT SERPL: 21.9 (ref 7–25)
BUN/CREAT SERPL: 22.2 (ref 7–25)
BUN/CREAT SERPL: 24.6 (ref 7–25)
BUN/CREAT SERPL: 27 (ref 7–25)
BUN/CREAT SERPL: 27.8 (ref 7–25)
C PNEUM DNA NPH QL NAA+NON-PROBE: NOT DETECTED
CALCIUM SPEC-SCNC: 10 MG/DL (ref 8.6–10.5)
CALCIUM SPEC-SCNC: 10.1 MG/DL (ref 8.6–10.5)
CALCIUM SPEC-SCNC: 10.2 MG/DL (ref 8.6–10.5)
CALCIUM SPEC-SCNC: 10.3 MG/DL (ref 8.6–10.5)
CALCIUM SPEC-SCNC: 10.3 MG/DL (ref 8.6–10.5)
CALCIUM SPEC-SCNC: 9.4 MG/DL (ref 8.6–10.5)
CALCIUM SPEC-SCNC: 9.5 MG/DL (ref 8.6–10.5)
CALCIUM SPEC-SCNC: 9.6 MG/DL (ref 8.6–10.5)
CALCIUM SPEC-SCNC: 9.6 MG/DL (ref 8.6–10.5)
CALCIUM SPEC-SCNC: 9.7 MG/DL (ref 8.6–10.5)
CALCIUM SPEC-SCNC: 9.7 MG/DL (ref 8.6–10.5)
CALCIUM SPEC-SCNC: 9.8 MG/DL (ref 8.6–10.5)
CALCIUM SPEC-SCNC: 9.8 MG/DL (ref 8.6–10.5)
CALCIUM SPEC-SCNC: 9.9 MG/DL (ref 8.6–10.5)
CALCIUM SPEC-SCNC: 9.9 MG/DL (ref 8.6–10.5)
CHLORIDE SERPL-SCNC: 100 MMOL/L (ref 98–107)
CHLORIDE SERPL-SCNC: 100 MMOL/L (ref 98–107)
CHLORIDE SERPL-SCNC: 101 MMOL/L (ref 98–107)
CHLORIDE SERPL-SCNC: 102 MMOL/L (ref 98–107)
CHLORIDE SERPL-SCNC: 103 MMOL/L (ref 98–107)
CHLORIDE SERPL-SCNC: 103 MMOL/L (ref 98–107)
CHLORIDE SERPL-SCNC: 97 MMOL/L (ref 98–107)
CHLORIDE SERPL-SCNC: 97 MMOL/L (ref 98–107)
CHLORIDE SERPL-SCNC: 98 MMOL/L (ref 98–107)
CHLORIDE SERPL-SCNC: 99 MMOL/L (ref 98–107)
CLARITY UR: CLEAR
CO2 SERPL-SCNC: 20 MMOL/L (ref 22–29)
CO2 SERPL-SCNC: 22 MMOL/L (ref 22–29)
CO2 SERPL-SCNC: 23 MMOL/L (ref 22–29)
CO2 SERPL-SCNC: 24 MMOL/L (ref 22–29)
CO2 SERPL-SCNC: 25 MMOL/L (ref 22–29)
CO2 SERPL-SCNC: 25 MMOL/L (ref 22–29)
CO2 SERPL-SCNC: 26 MMOL/L (ref 22–29)
CO2 SERPL-SCNC: 27 MMOL/L (ref 22–29)
CO2 SERPL-SCNC: 28 MMOL/L (ref 22–29)
CO2 SERPL-SCNC: 28 MMOL/L (ref 22–29)
CO2 SERPL-SCNC: 30 MMOL/L (ref 22–29)
COLOR UR: YELLOW
CREAT SERPL-MCNC: 0.6 MG/DL (ref 0.57–1)
CREAT SERPL-MCNC: 0.61 MG/DL (ref 0.57–1)
CREAT SERPL-MCNC: 0.63 MG/DL (ref 0.57–1)
CREAT SERPL-MCNC: 0.64 MG/DL (ref 0.57–1)
CREAT SERPL-MCNC: 0.67 MG/DL (ref 0.57–1)
CREAT SERPL-MCNC: 0.68 MG/DL (ref 0.57–1)
CREAT SERPL-MCNC: 0.68 MG/DL (ref 0.57–1)
CREAT SERPL-MCNC: 0.69 MG/DL (ref 0.57–1)
CREAT SERPL-MCNC: 0.71 MG/DL (ref 0.57–1)
CREAT SERPL-MCNC: 0.73 MG/DL (ref 0.57–1)
CREAT SERPL-MCNC: 0.74 MG/DL (ref 0.57–1)
CREAT SERPL-MCNC: 0.79 MG/DL (ref 0.57–1)
CREAT SERPL-MCNC: 0.9 MG/DL (ref 0.57–1)
CRP SERPL-MCNC: 0.89 MG/DL (ref 0–0.5)
D-LACTATE SERPL-SCNC: 2.7 MMOL/L (ref 0.5–2)
D-LACTATE SERPL-SCNC: 4.5 MMOL/L (ref 0.5–2)
DEPRECATED RDW RBC AUTO: 41.7 FL (ref 37–54)
DEPRECATED RDW RBC AUTO: 42.5 FL (ref 37–54)
DEPRECATED RDW RBC AUTO: 44.1 FL (ref 37–54)
DEPRECATED RDW RBC AUTO: 44.1 FL (ref 37–54)
DEPRECATED RDW RBC AUTO: 44.2 FL (ref 37–54)
DEPRECATED RDW RBC AUTO: 44.4 FL (ref 37–54)
DEPRECATED RDW RBC AUTO: 46.6 FL (ref 37–54)
DEPRECATED RDW RBC AUTO: 48.4 FL (ref 37–54)
DEPRECATED RDW RBC AUTO: 48.7 FL (ref 37–54)
DEPRECATED RDW RBC AUTO: 49.1 FL (ref 37–54)
DEPRECATED RDW RBC AUTO: 49.7 FL (ref 37–54)
DEPRECATED RDW RBC AUTO: 50.1 FL (ref 37–54)
DEPRECATED RDW RBC AUTO: 50.2 FL (ref 37–54)
DEPRECATED RDW RBC AUTO: 50.8 FL (ref 37–54)
DEPRECATED RDW RBC AUTO: 51 FL (ref 37–54)
EOSINOPHIL # BLD AUTO: 0 10*3/MM3 (ref 0–0.4)
EOSINOPHIL # BLD AUTO: 0.02 10*3/MM3 (ref 0–0.4)
EOSINOPHIL # BLD AUTO: 0.11 10*3/MM3 (ref 0–0.4)
EOSINOPHIL # BLD AUTO: 0.25 10*3/MM3 (ref 0–0.4)
EOSINOPHIL # BLD AUTO: 0.27 10*3/MM3 (ref 0–0.4)
EOSINOPHIL # BLD AUTO: 0.44 10*3/MM3 (ref 0–0.4)
EOSINOPHIL # BLD AUTO: 0.49 10*3/MM3 (ref 0–0.4)
EOSINOPHIL # BLD AUTO: 0.55 10*3/MM3 (ref 0–0.4)
EOSINOPHIL # BLD AUTO: 0.56 10*3/MM3 (ref 0–0.4)
EOSINOPHIL # BLD AUTO: 0.62 10*3/MM3 (ref 0–0.4)
EOSINOPHIL # BLD AUTO: 0.62 10*3/MM3 (ref 0–0.4)
EOSINOPHIL # BLD AUTO: 0.71 10*3/MM3 (ref 0–0.4)
EOSINOPHIL # BLD AUTO: 0.77 10*3/MM3 (ref 0–0.4)
EOSINOPHIL # BLD AUTO: 1.02 10*3/MM3 (ref 0–0.4)
EOSINOPHIL # BLD MANUAL: 0.58 10*3/MM3 (ref 0–0.4)
EOSINOPHIL NFR BLD AUTO: 0 % (ref 0.3–6.2)
EOSINOPHIL NFR BLD AUTO: 0.2 % (ref 0.3–6.2)
EOSINOPHIL NFR BLD AUTO: 1 % (ref 0.3–6.2)
EOSINOPHIL NFR BLD AUTO: 2.3 % (ref 0.3–6.2)
EOSINOPHIL NFR BLD AUTO: 2.7 % (ref 0.3–6.2)
EOSINOPHIL NFR BLD AUTO: 4.9 % (ref 0.3–6.2)
EOSINOPHIL NFR BLD AUTO: 6 % (ref 0.3–6.2)
EOSINOPHIL NFR BLD AUTO: 6.2 % (ref 0.3–6.2)
EOSINOPHIL NFR BLD AUTO: 7 % (ref 0.3–6.2)
EOSINOPHIL NFR BLD AUTO: 7.2 % (ref 0.3–6.2)
EOSINOPHIL NFR BLD AUTO: 7.2 % (ref 0.3–6.2)
EOSINOPHIL NFR BLD AUTO: 7.6 % (ref 0.3–6.2)
EOSINOPHIL NFR BLD AUTO: 8.7 % (ref 0.3–6.2)
EOSINOPHIL NFR BLD AUTO: 9.8 % (ref 0.3–6.2)
EOSINOPHIL NFR BLD MANUAL: 5 % (ref 0.3–6.2)
ERYTHROCYTE [DISTWIDTH] IN BLOOD BY AUTOMATED COUNT: 13.7 % (ref 12.3–15.4)
ERYTHROCYTE [DISTWIDTH] IN BLOOD BY AUTOMATED COUNT: 13.7 % (ref 12.3–15.4)
ERYTHROCYTE [DISTWIDTH] IN BLOOD BY AUTOMATED COUNT: 13.9 % (ref 12.3–15.4)
ERYTHROCYTE [DISTWIDTH] IN BLOOD BY AUTOMATED COUNT: 14 % (ref 12.3–15.4)
ERYTHROCYTE [DISTWIDTH] IN BLOOD BY AUTOMATED COUNT: 14.3 % (ref 12.3–15.4)
ERYTHROCYTE [DISTWIDTH] IN BLOOD BY AUTOMATED COUNT: 14.6 % (ref 12.3–15.4)
ERYTHROCYTE [DISTWIDTH] IN BLOOD BY AUTOMATED COUNT: 14.6 % (ref 12.3–15.4)
ERYTHROCYTE [DISTWIDTH] IN BLOOD BY AUTOMATED COUNT: 15.6 % (ref 12.3–15.4)
ERYTHROCYTE [DISTWIDTH] IN BLOOD BY AUTOMATED COUNT: 15.8 % (ref 12.3–15.4)
ERYTHROCYTE [DISTWIDTH] IN BLOOD BY AUTOMATED COUNT: 15.9 % (ref 12.3–15.4)
ERYTHROCYTE [DISTWIDTH] IN BLOOD BY AUTOMATED COUNT: 15.9 % (ref 12.3–15.4)
ERYTHROCYTE [DISTWIDTH] IN BLOOD BY AUTOMATED COUNT: 16 % (ref 12.3–15.4)
ERYTHROCYTE [DISTWIDTH] IN BLOOD BY AUTOMATED COUNT: 16 % (ref 12.3–15.4)
ERYTHROCYTE [DISTWIDTH] IN BLOOD BY AUTOMATED COUNT: 16.1 % (ref 12.3–15.4)
ERYTHROCYTE [DISTWIDTH] IN BLOOD BY AUTOMATED COUNT: 16.4 % (ref 12.3–15.4)
FLUAV RNA RESP QL NAA+PROBE: NOT DETECTED
FLUAV SUBTYP SPEC NAA+PROBE: NOT DETECTED
FLUBV RNA ISLT QL NAA+PROBE: NOT DETECTED
FLUBV RNA RESP QL NAA+PROBE: NOT DETECTED
GFR SERPL CREATININE-BSD FRML MDRD: 61 ML/MIN/1.73
GFR SERPL CREATININE-BSD FRML MDRD: 71 ML/MIN/1.73
GFR SERPL CREATININE-BSD FRML MDRD: 77 ML/MIN/1.73
GFR SERPL CREATININE-BSD FRML MDRD: 78 ML/MIN/1.73
GFR SERPL CREATININE-BSD FRML MDRD: 81 ML/MIN/1.73
GFR SERPL CREATININE-BSD FRML MDRD: 83 ML/MIN/1.73
GFR SERPL CREATININE-BSD FRML MDRD: 85 ML/MIN/1.73
GFR SERPL CREATININE-BSD FRML MDRD: 85 ML/MIN/1.73
GFR SERPL CREATININE-BSD FRML MDRD: 86 ML/MIN/1.73
GFR SERPL CREATININE-BSD FRML MDRD: 91 ML/MIN/1.73
GFR SERPL CREATININE-BSD FRML MDRD: 93 ML/MIN/1.73
GFR SERPL CREATININE-BSD FRML MDRD: 96 ML/MIN/1.73
GFR SERPL CREATININE-BSD FRML MDRD: 98 ML/MIN/1.73
GLOBULIN UR ELPH-MCNC: 3 GM/DL
GLOBULIN UR ELPH-MCNC: 3 GM/DL
GLOBULIN UR ELPH-MCNC: 3.4 GM/DL
GLOBULIN UR ELPH-MCNC: 3.5 GM/DL
GLOBULIN UR ELPH-MCNC: 3.5 GM/DL
GLOBULIN UR ELPH-MCNC: 3.7 GM/DL
GLOBULIN UR ELPH-MCNC: 3.7 GM/DL
GLOBULIN UR ELPH-MCNC: 3.8 GM/DL
GLOBULIN UR ELPH-MCNC: 3.9 GM/DL
GLUCOSE BLDC GLUCOMTR-MCNC: 116 MG/DL (ref 70–130)
GLUCOSE BLDC GLUCOMTR-MCNC: 120 MG/DL (ref 70–130)
GLUCOSE BLDC GLUCOMTR-MCNC: 140 MG/DL (ref 70–130)
GLUCOSE BLDC GLUCOMTR-MCNC: 144 MG/DL (ref 70–130)
GLUCOSE BLDC GLUCOMTR-MCNC: 150 MG/DL (ref 70–130)
GLUCOSE BLDC GLUCOMTR-MCNC: 150 MG/DL (ref 70–130)
GLUCOSE BLDC GLUCOMTR-MCNC: 165 MG/DL (ref 70–130)
GLUCOSE BLDC GLUCOMTR-MCNC: 165 MG/DL (ref 70–130)
GLUCOSE BLDC GLUCOMTR-MCNC: 167 MG/DL (ref 70–130)
GLUCOSE BLDC GLUCOMTR-MCNC: 177 MG/DL (ref 70–130)
GLUCOSE BLDC GLUCOMTR-MCNC: 182 MG/DL (ref 70–130)
GLUCOSE BLDC GLUCOMTR-MCNC: 190 MG/DL (ref 70–130)
GLUCOSE BLDC GLUCOMTR-MCNC: 195 MG/DL (ref 70–130)
GLUCOSE BLDC GLUCOMTR-MCNC: 198 MG/DL (ref 70–130)
GLUCOSE BLDC GLUCOMTR-MCNC: 204 MG/DL (ref 70–130)
GLUCOSE BLDC GLUCOMTR-MCNC: 208 MG/DL (ref 70–130)
GLUCOSE BLDC GLUCOMTR-MCNC: 211 MG/DL (ref 70–130)
GLUCOSE BLDC GLUCOMTR-MCNC: 213 MG/DL (ref 70–130)
GLUCOSE BLDC GLUCOMTR-MCNC: 215 MG/DL (ref 70–130)
GLUCOSE BLDC GLUCOMTR-MCNC: 224 MG/DL (ref 70–130)
GLUCOSE BLDC GLUCOMTR-MCNC: 230 MG/DL (ref 70–130)
GLUCOSE BLDC GLUCOMTR-MCNC: 234 MG/DL (ref 70–130)
GLUCOSE BLDC GLUCOMTR-MCNC: 246 MG/DL (ref 70–130)
GLUCOSE BLDC GLUCOMTR-MCNC: 253 MG/DL (ref 70–130)
GLUCOSE BLDC GLUCOMTR-MCNC: 255 MG/DL (ref 70–130)
GLUCOSE BLDC GLUCOMTR-MCNC: 285 MG/DL (ref 70–130)
GLUCOSE BLDC GLUCOMTR-MCNC: 294 MG/DL (ref 70–130)
GLUCOSE BLDC GLUCOMTR-MCNC: 297 MG/DL (ref 70–130)
GLUCOSE BLDC GLUCOMTR-MCNC: 308 MG/DL (ref 70–130)
GLUCOSE BLDC GLUCOMTR-MCNC: 309 MG/DL (ref 70–130)
GLUCOSE BLDC GLUCOMTR-MCNC: 311 MG/DL (ref 70–130)
GLUCOSE BLDC GLUCOMTR-MCNC: 313 MG/DL (ref 70–130)
GLUCOSE BLDC GLUCOMTR-MCNC: 322 MG/DL (ref 70–130)
GLUCOSE BLDC GLUCOMTR-MCNC: 328 MG/DL (ref 70–130)
GLUCOSE BLDC GLUCOMTR-MCNC: 353 MG/DL (ref 70–130)
GLUCOSE BLDC GLUCOMTR-MCNC: 357 MG/DL (ref 70–130)
GLUCOSE BLDC GLUCOMTR-MCNC: 406 MG/DL (ref 70–130)
GLUCOSE SERPL-MCNC: 119 MG/DL (ref 65–99)
GLUCOSE SERPL-MCNC: 133 MG/DL (ref 65–99)
GLUCOSE SERPL-MCNC: 143 MG/DL (ref 65–99)
GLUCOSE SERPL-MCNC: 154 MG/DL (ref 65–99)
GLUCOSE SERPL-MCNC: 154 MG/DL (ref 65–99)
GLUCOSE SERPL-MCNC: 156 MG/DL (ref 65–99)
GLUCOSE SERPL-MCNC: 163 MG/DL (ref 65–99)
GLUCOSE SERPL-MCNC: 166 MG/DL (ref 65–99)
GLUCOSE SERPL-MCNC: 172 MG/DL (ref 65–99)
GLUCOSE SERPL-MCNC: 173 MG/DL (ref 65–99)
GLUCOSE SERPL-MCNC: 219 MG/DL (ref 65–99)
GLUCOSE SERPL-MCNC: 222 MG/DL (ref 65–99)
GLUCOSE SERPL-MCNC: 244 MG/DL (ref 65–99)
GLUCOSE SERPL-MCNC: 358 MG/DL (ref 65–99)
GLUCOSE SERPL-MCNC: 60 MG/DL (ref 65–99)
GLUCOSE UR STRIP-MCNC: NEGATIVE MG/DL
HADV DNA SPEC NAA+PROBE: NOT DETECTED
HBA1C MFR BLD: 6.5 % (ref 4.8–5.6)
HCOV 229E RNA SPEC QL NAA+PROBE: NOT DETECTED
HCOV HKU1 RNA SPEC QL NAA+PROBE: NOT DETECTED
HCOV NL63 RNA SPEC QL NAA+PROBE: NOT DETECTED
HCOV OC43 RNA SPEC QL NAA+PROBE: NOT DETECTED
HCT VFR BLD AUTO: 34.1 % (ref 34–46.6)
HCT VFR BLD AUTO: 34.4 % (ref 34–46.6)
HCT VFR BLD AUTO: 34.9 % (ref 34–46.6)
HCT VFR BLD AUTO: 35.1 % (ref 34–46.6)
HCT VFR BLD AUTO: 36.2 % (ref 34–46.6)
HCT VFR BLD AUTO: 36.6 % (ref 34–46.6)
HCT VFR BLD AUTO: 36.6 % (ref 34–46.6)
HCT VFR BLD AUTO: 37.2 % (ref 34–46.6)
HCT VFR BLD AUTO: 37.4 % (ref 34–46.6)
HCT VFR BLD AUTO: 37.5 % (ref 34–46.6)
HCT VFR BLD AUTO: 37.5 % (ref 34–46.6)
HCT VFR BLD AUTO: 37.8 % (ref 34–46.6)
HCT VFR BLD AUTO: 38.5 % (ref 34–46.6)
HGB BLD-MCNC: 11.2 G/DL (ref 12–15.9)
HGB BLD-MCNC: 11.4 G/DL (ref 12–15.9)
HGB BLD-MCNC: 11.6 G/DL (ref 12–15.9)
HGB BLD-MCNC: 11.6 G/DL (ref 12–15.9)
HGB BLD-MCNC: 11.7 G/DL (ref 12–15.9)
HGB BLD-MCNC: 11.7 G/DL (ref 12–15.9)
HGB BLD-MCNC: 11.8 G/DL (ref 12–15.9)
HGB BLD-MCNC: 11.9 G/DL (ref 12–15.9)
HGB BLD-MCNC: 12.3 G/DL (ref 12–15.9)
HGB BLD-MCNC: 12.4 G/DL (ref 12–15.9)
HGB BLD-MCNC: 12.7 G/DL (ref 12–15.9)
HGB BLD-MCNC: 12.7 G/DL (ref 12–15.9)
HGB UR QL STRIP.AUTO: NEGATIVE
HMPV RNA NPH QL NAA+NON-PROBE: NOT DETECTED
HOLD SPECIMEN: NORMAL
HPIV1 RNA SPEC QL NAA+PROBE: NOT DETECTED
HPIV2 RNA SPEC QL NAA+PROBE: NOT DETECTED
HPIV3 RNA NPH QL NAA+PROBE: NOT DETECTED
HPIV4 P GENE NPH QL NAA+PROBE: NOT DETECTED
HYALINE CASTS UR QL AUTO: ABNORMAL /LPF
IMM GRANULOCYTES # BLD AUTO: 0.02 10*3/MM3 (ref 0–0.05)
IMM GRANULOCYTES # BLD AUTO: 0.03 10*3/MM3 (ref 0–0.05)
IMM GRANULOCYTES # BLD AUTO: 0.04 10*3/MM3 (ref 0–0.05)
IMM GRANULOCYTES # BLD AUTO: 0.04 10*3/MM3 (ref 0–0.05)
IMM GRANULOCYTES # BLD AUTO: 0.06 10*3/MM3 (ref 0–0.05)
IMM GRANULOCYTES # BLD AUTO: 0.07 10*3/MM3 (ref 0–0.05)
IMM GRANULOCYTES # BLD AUTO: 0.11 10*3/MM3 (ref 0–0.05)
IMM GRANULOCYTES NFR BLD AUTO: 0.2 % (ref 0–0.5)
IMM GRANULOCYTES NFR BLD AUTO: 0.3 % (ref 0–0.5)
IMM GRANULOCYTES NFR BLD AUTO: 0.4 % (ref 0–0.5)
IMM GRANULOCYTES NFR BLD AUTO: 0.4 % (ref 0–0.5)
IMM GRANULOCYTES NFR BLD AUTO: 0.6 % (ref 0–0.5)
IMM GRANULOCYTES NFR BLD AUTO: 0.6 % (ref 0–0.5)
IMM GRANULOCYTES NFR BLD AUTO: 0.7 % (ref 0–0.5)
IMM GRANULOCYTES NFR BLD AUTO: 0.8 % (ref 0–0.5)
IMM GRANULOCYTES NFR BLD AUTO: 1.3 % (ref 0–0.5)
KETONES UR QL STRIP: NEGATIVE
L PNEUMO1 AG UR QL IA: NEGATIVE
LAB AP CASE REPORT: NORMAL
LEUKOCYTE ESTERASE UR QL STRIP.AUTO: NEGATIVE
LYMPHOCYTES # BLD AUTO: 0.99 10*3/MM3 (ref 0.7–3.1)
LYMPHOCYTES # BLD AUTO: 1.48 10*3/MM3 (ref 0.7–3.1)
LYMPHOCYTES # BLD AUTO: 1.6 10*3/MM3 (ref 0.7–3.1)
LYMPHOCYTES # BLD AUTO: 1.65 10*3/MM3 (ref 0.7–3.1)
LYMPHOCYTES # BLD AUTO: 1.71 10*3/MM3 (ref 0.7–3.1)
LYMPHOCYTES # BLD AUTO: 1.79 10*3/MM3 (ref 0.7–3.1)
LYMPHOCYTES # BLD AUTO: 1.92 10*3/MM3 (ref 0.7–3.1)
LYMPHOCYTES # BLD AUTO: 1.96 10*3/MM3 (ref 0.7–3.1)
LYMPHOCYTES # BLD AUTO: 2.03 10*3/MM3 (ref 0.7–3.1)
LYMPHOCYTES # BLD AUTO: 2.06 10*3/MM3 (ref 0.7–3.1)
LYMPHOCYTES # BLD AUTO: 2.12 10*3/MM3 (ref 0.7–3.1)
LYMPHOCYTES # BLD AUTO: 2.12 10*3/MM3 (ref 0.7–3.1)
LYMPHOCYTES # BLD AUTO: 2.35 10*3/MM3 (ref 0.7–3.1)
LYMPHOCYTES # BLD AUTO: 2.52 10*3/MM3 (ref 0.7–3.1)
LYMPHOCYTES # BLD MANUAL: 1.87 10*3/MM3 (ref 0.7–3.1)
LYMPHOCYTES NFR BLD AUTO: 15.1 % (ref 19.6–45.3)
LYMPHOCYTES NFR BLD AUTO: 17.7 % (ref 19.6–45.3)
LYMPHOCYTES NFR BLD AUTO: 18.8 % (ref 19.6–45.3)
LYMPHOCYTES NFR BLD AUTO: 19.6 % (ref 19.6–45.3)
LYMPHOCYTES NFR BLD AUTO: 20.2 % (ref 19.6–45.3)
LYMPHOCYTES NFR BLD AUTO: 20.9 % (ref 19.6–45.3)
LYMPHOCYTES NFR BLD AUTO: 21 % (ref 19.6–45.3)
LYMPHOCYTES NFR BLD AUTO: 21.2 % (ref 19.6–45.3)
LYMPHOCYTES NFR BLD AUTO: 21.6 % (ref 19.6–45.3)
LYMPHOCYTES NFR BLD AUTO: 22.2 % (ref 19.6–45.3)
LYMPHOCYTES NFR BLD AUTO: 24.1 % (ref 19.6–45.3)
LYMPHOCYTES NFR BLD AUTO: 25.1 % (ref 19.6–45.3)
LYMPHOCYTES NFR BLD AUTO: 26.5 % (ref 19.6–45.3)
LYMPHOCYTES NFR BLD AUTO: 9.8 % (ref 19.6–45.3)
LYMPHOCYTES NFR BLD MANUAL: 16 % (ref 19.6–45.3)
LYMPHOCYTES NFR BLD MANUAL: 9 % (ref 5–12)
M PNEUMO IGG SER IA-ACNC: NOT DETECTED
MCH RBC QN AUTO: 27.2 PG (ref 26.6–33)
MCH RBC QN AUTO: 27.5 PG (ref 26.6–33)
MCH RBC QN AUTO: 27.8 PG (ref 26.6–33)
MCH RBC QN AUTO: 27.9 PG (ref 26.6–33)
MCH RBC QN AUTO: 28 PG (ref 26.6–33)
MCH RBC QN AUTO: 28.1 PG (ref 26.6–33)
MCH RBC QN AUTO: 28.3 PG (ref 26.6–33)
MCH RBC QN AUTO: 28.4 PG (ref 26.6–33)
MCH RBC QN AUTO: 28.4 PG (ref 26.6–33)
MCH RBC QN AUTO: 28.5 PG (ref 26.6–33)
MCH RBC QN AUTO: 28.7 PG (ref 26.6–33)
MCH RBC QN AUTO: 28.7 PG (ref 26.6–33)
MCH RBC QN AUTO: 29.3 PG (ref 26.6–33)
MCHC RBC AUTO-ENTMCNC: 31.7 G/DL (ref 31.5–35.7)
MCHC RBC AUTO-ENTMCNC: 31.7 G/DL (ref 31.5–35.7)
MCHC RBC AUTO-ENTMCNC: 31.9 G/DL (ref 31.5–35.7)
MCHC RBC AUTO-ENTMCNC: 31.9 G/DL (ref 31.5–35.7)
MCHC RBC AUTO-ENTMCNC: 32 G/DL (ref 31.5–35.7)
MCHC RBC AUTO-ENTMCNC: 32.2 G/DL (ref 31.5–35.7)
MCHC RBC AUTO-ENTMCNC: 32.5 G/DL (ref 31.5–35.7)
MCHC RBC AUTO-ENTMCNC: 32.8 G/DL (ref 31.5–35.7)
MCHC RBC AUTO-ENTMCNC: 33 G/DL (ref 31.5–35.7)
MCHC RBC AUTO-ENTMCNC: 33 G/DL (ref 31.5–35.7)
MCHC RBC AUTO-ENTMCNC: 33.1 G/DL (ref 31.5–35.7)
MCHC RBC AUTO-ENTMCNC: 33.2 G/DL (ref 31.5–35.7)
MCHC RBC AUTO-ENTMCNC: 33.4 G/DL (ref 31.5–35.7)
MCHC RBC AUTO-ENTMCNC: 33.5 G/DL (ref 31.5–35.7)
MCHC RBC AUTO-ENTMCNC: 34 G/DL (ref 31.5–35.7)
MCV RBC AUTO: 83.3 FL (ref 79–97)
MCV RBC AUTO: 84.8 FL (ref 79–97)
MCV RBC AUTO: 85.4 FL (ref 79–97)
MCV RBC AUTO: 85.9 FL (ref 79–97)
MCV RBC AUTO: 86.1 FL (ref 79–97)
MCV RBC AUTO: 86.2 FL (ref 79–97)
MCV RBC AUTO: 86.5 FL (ref 79–97)
MCV RBC AUTO: 86.6 FL (ref 79–97)
MCV RBC AUTO: 86.7 FL (ref 79–97)
MCV RBC AUTO: 86.9 FL (ref 79–97)
MCV RBC AUTO: 87.2 FL (ref 79–97)
MCV RBC AUTO: 87.3 FL (ref 79–97)
MCV RBC AUTO: 87.6 FL (ref 79–97)
METAMYELOCYTES NFR BLD MANUAL: 1 % (ref 0–0)
MONOCYTES # BLD AUTO: 0.41 10*3/MM3 (ref 0.1–0.9)
MONOCYTES # BLD AUTO: 0.55 10*3/MM3 (ref 0.1–0.9)
MONOCYTES # BLD AUTO: 0.57 10*3/MM3 (ref 0.1–0.9)
MONOCYTES # BLD AUTO: 0.63 10*3/MM3 (ref 0.1–0.9)
MONOCYTES # BLD AUTO: 0.63 10*3/MM3 (ref 0.1–0.9)
MONOCYTES # BLD AUTO: 0.64 10*3/MM3 (ref 0.1–0.9)
MONOCYTES # BLD AUTO: 0.65 10*3/MM3 (ref 0.1–0.9)
MONOCYTES # BLD AUTO: 0.67 10*3/MM3 (ref 0.1–0.9)
MONOCYTES # BLD AUTO: 0.69 10*3/MM3 (ref 0.1–0.9)
MONOCYTES # BLD AUTO: 0.7 10*3/MM3 (ref 0.1–0.9)
MONOCYTES # BLD AUTO: 0.7 10*3/MM3 (ref 0.1–0.9)
MONOCYTES # BLD AUTO: 0.84 10*3/MM3 (ref 0.1–0.9)
MONOCYTES # BLD AUTO: 0.86 10*3/MM3 (ref 0.1–0.9)
MONOCYTES # BLD AUTO: 1.05 10*3/MM3 (ref 0.1–0.9)
MONOCYTES # BLD AUTO: 1.12 10*3/MM3 (ref 0.1–0.9)
MONOCYTES NFR BLD AUTO: 4.1 % (ref 5–12)
MONOCYTES NFR BLD AUTO: 5.5 % (ref 5–12)
MONOCYTES NFR BLD AUTO: 6.4 % (ref 5–12)
MONOCYTES NFR BLD AUTO: 6.4 % (ref 5–12)
MONOCYTES NFR BLD AUTO: 7.3 % (ref 5–12)
MONOCYTES NFR BLD AUTO: 7.5 % (ref 5–12)
MONOCYTES NFR BLD AUTO: 7.7 % (ref 5–12)
MONOCYTES NFR BLD AUTO: 7.8 % (ref 5–12)
MONOCYTES NFR BLD AUTO: 7.8 % (ref 5–12)
MONOCYTES NFR BLD AUTO: 8 % (ref 5–12)
MONOCYTES NFR BLD AUTO: 8.2 % (ref 5–12)
MONOCYTES NFR BLD AUTO: 8.5 % (ref 5–12)
MONOCYTES NFR BLD AUTO: 8.5 % (ref 5–12)
MONOCYTES NFR BLD AUTO: 9.9 % (ref 5–12)
NEUTROPHILS # BLD AUTO: 7.94 10*3/MM3 (ref 1.7–7)
NEUTROPHILS NFR BLD AUTO: 4.42 10*3/MM3 (ref 1.7–7)
NEUTROPHILS NFR BLD AUTO: 4.93 10*3/MM3 (ref 1.7–7)
NEUTROPHILS NFR BLD AUTO: 5.32 10*3/MM3 (ref 1.7–7)
NEUTROPHILS NFR BLD AUTO: 5.38 10*3/MM3 (ref 1.7–7)
NEUTROPHILS NFR BLD AUTO: 5.53 10*3/MM3 (ref 1.7–7)
NEUTROPHILS NFR BLD AUTO: 5.67 10*3/MM3 (ref 1.7–7)
NEUTROPHILS NFR BLD AUTO: 5.87 10*3/MM3 (ref 1.7–7)
NEUTROPHILS NFR BLD AUTO: 57 % (ref 42.7–76)
NEUTROPHILS NFR BLD AUTO: 58.7 % (ref 42.7–76)
NEUTROPHILS NFR BLD AUTO: 58.9 % (ref 42.7–76)
NEUTROPHILS NFR BLD AUTO: 6.06 10*3/MM3 (ref 1.7–7)
NEUTROPHILS NFR BLD AUTO: 6.15 10*3/MM3 (ref 1.7–7)
NEUTROPHILS NFR BLD AUTO: 6.61 10*3/MM3 (ref 1.7–7)
NEUTROPHILS NFR BLD AUTO: 6.79 10*3/MM3 (ref 1.7–7)
NEUTROPHILS NFR BLD AUTO: 62 % (ref 42.7–76)
NEUTROPHILS NFR BLD AUTO: 62.7 % (ref 42.7–76)
NEUTROPHILS NFR BLD AUTO: 63.8 % (ref 42.7–76)
NEUTROPHILS NFR BLD AUTO: 64.8 % (ref 42.7–76)
NEUTROPHILS NFR BLD AUTO: 65.2 % (ref 42.7–76)
NEUTROPHILS NFR BLD AUTO: 66.2 % (ref 42.7–76)
NEUTROPHILS NFR BLD AUTO: 67 % (ref 42.7–76)
NEUTROPHILS NFR BLD AUTO: 7.62 10*3/MM3 (ref 1.7–7)
NEUTROPHILS NFR BLD AUTO: 70.4 % (ref 42.7–76)
NEUTROPHILS NFR BLD AUTO: 72.6 % (ref 42.7–76)
NEUTROPHILS NFR BLD AUTO: 73.2 % (ref 42.7–76)
NEUTROPHILS NFR BLD AUTO: 8.29 10*3/MM3 (ref 1.7–7)
NEUTROPHILS NFR BLD AUTO: 8.64 10*3/MM3 (ref 1.7–7)
NEUTROPHILS NFR BLD AUTO: 85.4 % (ref 42.7–76)
NEUTROPHILS NFR BLD MANUAL: 67 % (ref 42.7–76)
NEUTS BAND NFR BLD MANUAL: 1 % (ref 0–5)
NITRITE UR QL STRIP: NEGATIVE
NRBC BLD AUTO-RTO: 0 /100 WBC (ref 0–0.2)
NT-PROBNP SERPL-MCNC: 268.5 PG/ML (ref 0–900)
PATH REPORT.FINAL DX SPEC: NORMAL
PH UR STRIP.AUTO: 6.5 [PH] (ref 5–9)
PLAT MORPH BLD: NORMAL
PLATELET # BLD AUTO: 252 10*3/MM3 (ref 140–450)
PLATELET # BLD AUTO: 307 10*3/MM3 (ref 140–450)
PLATELET # BLD AUTO: 314 10*3/MM3 (ref 140–450)
PLATELET # BLD AUTO: 320 10*3/MM3 (ref 140–450)
PLATELET # BLD AUTO: 330 10*3/MM3 (ref 140–450)
PLATELET # BLD AUTO: 332 10*3/MM3 (ref 140–450)
PLATELET # BLD AUTO: 339 10*3/MM3 (ref 140–450)
PLATELET # BLD AUTO: 349 10*3/MM3 (ref 140–450)
PLATELET # BLD AUTO: 357 10*3/MM3 (ref 140–450)
PLATELET # BLD AUTO: 359 10*3/MM3 (ref 140–450)
PLATELET # BLD AUTO: 361 10*3/MM3 (ref 140–450)
PLATELET # BLD AUTO: 364 10*3/MM3 (ref 140–450)
PLATELET # BLD AUTO: 367 10*3/MM3 (ref 140–450)
PLATELET # BLD AUTO: 369 10*3/MM3 (ref 140–450)
PLATELET # BLD AUTO: 383 10*3/MM3 (ref 140–450)
PMV BLD AUTO: 10 FL (ref 6–12)
PMV BLD AUTO: 8.5 FL (ref 6–12)
PMV BLD AUTO: 8.6 FL (ref 6–12)
PMV BLD AUTO: 8.7 FL (ref 6–12)
PMV BLD AUTO: 8.9 FL (ref 6–12)
PMV BLD AUTO: 9.1 FL (ref 6–12)
PMV BLD AUTO: 9.3 FL (ref 6–12)
PMV BLD AUTO: 9.4 FL (ref 6–12)
PMV BLD AUTO: 9.4 FL (ref 6–12)
PMV BLD AUTO: 9.5 FL (ref 6–12)
PMV BLD AUTO: 9.6 FL (ref 6–12)
PMV BLD AUTO: 9.7 FL (ref 6–12)
PMV BLD AUTO: 9.8 FL (ref 6–12)
POTASSIUM SERPL-SCNC: 4.4 MMOL/L (ref 3.5–5.2)
POTASSIUM SERPL-SCNC: 4.5 MMOL/L (ref 3.5–5.2)
POTASSIUM SERPL-SCNC: 4.6 MMOL/L (ref 3.5–5.2)
POTASSIUM SERPL-SCNC: 4.7 MMOL/L (ref 3.5–5.2)
POTASSIUM SERPL-SCNC: 4.8 MMOL/L (ref 3.5–5.2)
POTASSIUM SERPL-SCNC: 4.9 MMOL/L (ref 3.5–5.2)
POTASSIUM SERPL-SCNC: 5.1 MMOL/L (ref 3.5–5.2)
POTASSIUM SERPL-SCNC: 5.1 MMOL/L (ref 3.5–5.2)
PROCALCITONIN SERPL-MCNC: 0.04 NG/ML (ref 0–0.25)
PROT SERPL-MCNC: 6.2 G/DL (ref 6–8.5)
PROT SERPL-MCNC: 6.7 G/DL (ref 6–8.5)
PROT SERPL-MCNC: 6.8 G/DL (ref 6–8.5)
PROT SERPL-MCNC: 6.9 G/DL (ref 6–8.5)
PROT SERPL-MCNC: 7 G/DL (ref 6–8.5)
PROT SERPL-MCNC: 7.2 G/DL (ref 6–8.5)
PROT SERPL-MCNC: 7.4 G/DL (ref 6–8.5)
PROT SERPL-MCNC: 7.5 G/DL (ref 6–8.5)
PROT SERPL-MCNC: 7.7 G/DL (ref 6–8.5)
PROT UR QL STRIP: ABNORMAL
QT INTERVAL: 436 MS
QT INTERVAL: 438 MS
QT INTERVAL: 444 MS
QT INTERVAL: 452 MS
QT INTERVAL: 472 MS
QT INTERVAL: 490 MS
QTC INTERVAL: 447 MS
QTC INTERVAL: 459 MS
QTC INTERVAL: 472 MS
QTC INTERVAL: 494 MS
QTC INTERVAL: 502 MS
QTC INTERVAL: 506 MS
RBC # BLD AUTO: 3.99 10*6/MM3 (ref 3.77–5.28)
RBC # BLD AUTO: 4.02 10*6/MM3 (ref 3.77–5.28)
RBC # BLD AUTO: 4.02 10*6/MM3 (ref 3.77–5.28)
RBC # BLD AUTO: 4.15 10*6/MM3 (ref 3.77–5.28)
RBC # BLD AUTO: 4.19 10*6/MM3 (ref 3.77–5.28)
RBC # BLD AUTO: 4.23 10*6/MM3 (ref 3.77–5.28)
RBC # BLD AUTO: 4.26 10*6/MM3 (ref 3.77–5.28)
RBC # BLD AUTO: 4.28 10*6/MM3 (ref 3.77–5.28)
RBC # BLD AUTO: 4.29 10*6/MM3 (ref 3.77–5.28)
RBC # BLD AUTO: 4.33 10*6/MM3 (ref 3.77–5.28)
RBC # BLD AUTO: 4.37 10*6/MM3 (ref 3.77–5.28)
RBC # BLD AUTO: 4.38 10*6/MM3 (ref 3.77–5.28)
RBC # BLD AUTO: 4.43 10*6/MM3 (ref 3.77–5.28)
RBC # BLD AUTO: 4.45 10*6/MM3 (ref 3.77–5.28)
RBC # BLD AUTO: 4.47 10*6/MM3 (ref 3.77–5.28)
RBC # UR: ABNORMAL /HPF
RBC MORPH BLD: NORMAL
REF LAB TEST METHOD: ABNORMAL
RHINOVIRUS RNA SPEC NAA+PROBE: NOT DETECTED
RSV RNA NPH QL NAA+NON-PROBE: NOT DETECTED
S PNEUM AG SPEC QL LA: NEGATIVE
SARS-COV-2 RNA RESP QL NAA+PROBE: NOT DETECTED
SODIUM SERPL-SCNC: 132 MMOL/L (ref 136–145)
SODIUM SERPL-SCNC: 133 MMOL/L (ref 136–145)
SODIUM SERPL-SCNC: 134 MMOL/L (ref 136–145)
SODIUM SERPL-SCNC: 135 MMOL/L (ref 136–145)
SODIUM SERPL-SCNC: 135 MMOL/L (ref 136–145)
SODIUM SERPL-SCNC: 136 MMOL/L (ref 136–145)
SODIUM SERPL-SCNC: 137 MMOL/L (ref 136–145)
SP GR UR STRIP: 1.02 (ref 1–1.03)
SQUAMOUS #/AREA URNS HPF: ABNORMAL /HPF
TROPONIN T SERPL-MCNC: <0.01 NG/ML (ref 0–0.03)
TROPONIN T SERPL-MCNC: <0.01 NG/ML (ref 0–0.03)
UROBILINOGEN UR QL STRIP: ABNORMAL
WBC # BLD AUTO: 10.11 10*3/MM3 (ref 3.4–10.8)
WBC # BLD AUTO: 10.13 10*3/MM3 (ref 3.4–10.8)
WBC # BLD AUTO: 10.46 10*3/MM3 (ref 3.4–10.8)
WBC # BLD AUTO: 10.81 10*3/MM3 (ref 3.4–10.8)
WBC # BLD AUTO: 11.32 10*3/MM3 (ref 3.4–10.8)
WBC # BLD AUTO: 11.67 10*3/MM3 (ref 3.4–10.8)
WBC # BLD AUTO: 7.76 10*3/MM3 (ref 3.4–10.8)
WBC # BLD AUTO: 7.86 10*3/MM3 (ref 3.4–10.8)
WBC # BLD AUTO: 8.16 10*3/MM3 (ref 3.4–10.8)
WBC # BLD AUTO: 8.35 10*3/MM3 (ref 3.4–10.8)
WBC # BLD AUTO: 8.66 10*3/MM3 (ref 3.4–10.8)
WBC # BLD AUTO: 8.88 10*3/MM3 (ref 3.4–10.8)
WBC # BLD AUTO: 9.06 10*3/MM3 (ref 3.4–10.8)
WBC # BLD AUTO: 9.38 10*3/MM3 (ref 3.4–10.8)
WBC # BLD AUTO: 9.99 10*3/MM3 (ref 3.4–10.8)
WBC MORPH BLD: NORMAL
WBC UR QL AUTO: ABNORMAL /HPF
WHOLE BLOOD HOLD SPECIMEN: NORMAL

## 2021-01-01 PROCEDURE — 94799 UNLISTED PULMONARY SVC/PX: CPT

## 2021-01-01 PROCEDURE — 94760 N-INVAS EAR/PLS OXIMETRY 1: CPT

## 2021-01-01 PROCEDURE — 82962 GLUCOSE BLOOD TEST: CPT

## 2021-01-01 PROCEDURE — 85025 COMPLETE CBC W/AUTO DIFF WBC: CPT | Performed by: STUDENT IN AN ORGANIZED HEALTH CARE EDUCATION/TRAINING PROGRAM

## 2021-01-01 PROCEDURE — 25010000002 LORAZEPAM PER 2 MG: Performed by: STUDENT IN AN ORGANIZED HEALTH CARE EDUCATION/TRAINING PROGRAM

## 2021-01-01 PROCEDURE — 86140 C-REACTIVE PROTEIN: CPT | Performed by: STUDENT IN AN ORGANIZED HEALTH CARE EDUCATION/TRAINING PROGRAM

## 2021-01-01 PROCEDURE — 76937 US GUIDE VASCULAR ACCESS: CPT

## 2021-01-01 PROCEDURE — 85025 COMPLETE CBC W/AUTO DIFF WBC: CPT | Performed by: EMERGENCY MEDICINE

## 2021-01-01 PROCEDURE — 97110 THERAPEUTIC EXERCISES: CPT

## 2021-01-01 PROCEDURE — 85025 COMPLETE CBC W/AUTO DIFF WBC: CPT | Performed by: NURSE PRACTITIONER

## 2021-01-01 PROCEDURE — 84484 ASSAY OF TROPONIN QUANT: CPT | Performed by: EMERGENCY MEDICINE

## 2021-01-01 PROCEDURE — 93005 ELECTROCARDIOGRAM TRACING: CPT | Performed by: INTERNAL MEDICINE

## 2021-01-01 PROCEDURE — 97166 OT EVAL MOD COMPLEX 45 MIN: CPT

## 2021-01-01 PROCEDURE — 25010000002 PIPERACILLIN SOD-TAZOBACTAM PER 1 G: Performed by: EMERGENCY MEDICINE

## 2021-01-01 PROCEDURE — 80053 COMPREHEN METABOLIC PANEL: CPT | Performed by: STUDENT IN AN ORGANIZED HEALTH CARE EDUCATION/TRAINING PROGRAM

## 2021-01-01 PROCEDURE — 71046 X-RAY EXAM CHEST 2 VIEWS: CPT

## 2021-01-01 PROCEDURE — 85025 COMPLETE CBC W/AUTO DIFF WBC: CPT | Performed by: INTERNAL MEDICINE

## 2021-01-01 PROCEDURE — 93010 ELECTROCARDIOGRAM REPORT: CPT | Performed by: INTERNAL MEDICINE

## 2021-01-01 PROCEDURE — 99222 1ST HOSP IP/OBS MODERATE 55: CPT | Performed by: INTERNAL MEDICINE

## 2021-01-01 PROCEDURE — 25010000002 MAGNESIUM SULFATE 2 GM/50ML SOLUTION: Performed by: STUDENT IN AN ORGANIZED HEALTH CARE EDUCATION/TRAINING PROGRAM

## 2021-01-01 PROCEDURE — 25010000002 HYDROMORPHONE 1 MG/ML SOLUTION: Performed by: INTERNAL MEDICINE

## 2021-01-01 PROCEDURE — 25010000002 METHYLPREDNISOLONE PER 125 MG: Performed by: STUDENT IN AN ORGANIZED HEALTH CARE EDUCATION/TRAINING PROGRAM

## 2021-01-01 PROCEDURE — 99214 OFFICE O/P EST MOD 30 MIN: CPT | Performed by: NURSE PRACTITIONER

## 2021-01-01 PROCEDURE — 99232 SBSQ HOSP IP/OBS MODERATE 35: CPT | Performed by: STUDENT IN AN ORGANIZED HEALTH CARE EDUCATION/TRAINING PROGRAM

## 2021-01-01 PROCEDURE — 94640 AIRWAY INHALATION TREATMENT: CPT

## 2021-01-01 PROCEDURE — 99233 SBSQ HOSP IP/OBS HIGH 50: CPT | Performed by: STUDENT IN AN ORGANIZED HEALTH CARE EDUCATION/TRAINING PROGRAM

## 2021-01-01 PROCEDURE — 71260 CT THORAX DX C+: CPT

## 2021-01-01 PROCEDURE — 87899 AGENT NOS ASSAY W/OPTIC: CPT | Performed by: STUDENT IN AN ORGANIZED HEALTH CARE EDUCATION/TRAINING PROGRAM

## 2021-01-01 PROCEDURE — 25010000002 CEFTRIAXONE PER 250 MG: Performed by: INTERNAL MEDICINE

## 2021-01-01 PROCEDURE — 80053 COMPREHEN METABOLIC PANEL: CPT | Performed by: NURSE PRACTITIONER

## 2021-01-01 PROCEDURE — 63710000001 INSULIN ASPART PER 5 UNITS: Performed by: STUDENT IN AN ORGANIZED HEALTH CARE EDUCATION/TRAINING PROGRAM

## 2021-01-01 PROCEDURE — 97535 SELF CARE MNGMENT TRAINING: CPT

## 2021-01-01 PROCEDURE — 63710000001 INSULIN DETEMIR PER 5 UNITS: Performed by: INTERNAL MEDICINE

## 2021-01-01 PROCEDURE — 83036 HEMOGLOBIN GLYCOSYLATED A1C: CPT | Performed by: STUDENT IN AN ORGANIZED HEALTH CARE EDUCATION/TRAINING PROGRAM

## 2021-01-01 PROCEDURE — 94660 CPAP INITIATION&MGMT: CPT

## 2021-01-01 PROCEDURE — 25010000002 AZITHROMYCIN PER 500 MG: Performed by: EMERGENCY MEDICINE

## 2021-01-01 PROCEDURE — 83605 ASSAY OF LACTIC ACID: CPT | Performed by: EMERGENCY MEDICINE

## 2021-01-01 PROCEDURE — C1751 CATH, INF, PER/CENT/MIDLINE: HCPCS

## 2021-01-01 PROCEDURE — 76536 US EXAM OF HEAD AND NECK: CPT

## 2021-01-01 PROCEDURE — 87040 BLOOD CULTURE FOR BACTERIA: CPT | Performed by: EMERGENCY MEDICINE

## 2021-01-01 PROCEDURE — 84145 PROCALCITONIN (PCT): CPT | Performed by: STUDENT IN AN ORGANIZED HEALTH CARE EDUCATION/TRAINING PROGRAM

## 2021-01-01 PROCEDURE — 80048 BASIC METABOLIC PNL TOTAL CA: CPT | Performed by: INTERNAL MEDICINE

## 2021-01-01 PROCEDURE — 99285 EMERGENCY DEPT VISIT HI MDM: CPT

## 2021-01-01 PROCEDURE — 63710000001 INSULIN DETEMIR PER 5 UNITS: Performed by: STUDENT IN AN ORGANIZED HEALTH CARE EDUCATION/TRAINING PROGRAM

## 2021-01-01 PROCEDURE — 63710000001 PREDNISONE PER 1 MG: Performed by: STUDENT IN AN ORGANIZED HEALTH CARE EDUCATION/TRAINING PROGRAM

## 2021-01-01 PROCEDURE — 0100U HC BIOFIRE FILMARRAY RESP PANEL 2: CPT | Performed by: STUDENT IN AN ORGANIZED HEALTH CARE EDUCATION/TRAINING PROGRAM

## 2021-01-01 PROCEDURE — 25010000002 ONDANSETRON PER 1 MG: Performed by: STUDENT IN AN ORGANIZED HEALTH CARE EDUCATION/TRAINING PROGRAM

## 2021-01-01 PROCEDURE — 87636 SARSCOV2 & INF A&B AMP PRB: CPT | Performed by: EMERGENCY MEDICINE

## 2021-01-01 PROCEDURE — 63710000001 INSULIN ASPART PER 5 UNITS: Performed by: INTERNAL MEDICINE

## 2021-01-01 PROCEDURE — 85007 BL SMEAR W/DIFF WBC COUNT: CPT | Performed by: NURSE PRACTITIONER

## 2021-01-01 PROCEDURE — 25010000002 CEFTRIAXONE PER 250 MG: Performed by: NURSE PRACTITIONER

## 2021-01-01 PROCEDURE — 99496 TRANSJ CARE MGMT HIGH F2F 7D: CPT | Performed by: NURSE PRACTITIONER

## 2021-01-01 PROCEDURE — 36415 COLL VENOUS BLD VENIPUNCTURE: CPT | Performed by: EMERGENCY MEDICINE

## 2021-01-01 PROCEDURE — 93005 ELECTROCARDIOGRAM TRACING: CPT | Performed by: EMERGENCY MEDICINE

## 2021-01-01 PROCEDURE — 97530 THERAPEUTIC ACTIVITIES: CPT

## 2021-01-01 PROCEDURE — 25010000002 IOPAMIDOL 61 % SOLUTION: Performed by: EMERGENCY MEDICINE

## 2021-01-01 PROCEDURE — 97116 GAIT TRAINING THERAPY: CPT

## 2021-01-01 PROCEDURE — 81001 URINALYSIS AUTO W/SCOPE: CPT | Performed by: EMERGENCY MEDICINE

## 2021-01-01 PROCEDURE — 25010000002 CEFTRIAXONE: Performed by: INTERNAL MEDICINE

## 2021-01-01 PROCEDURE — 25010000002 METHYLPREDNISOLONE PER 125 MG: Performed by: EMERGENCY MEDICINE

## 2021-01-01 PROCEDURE — 99239 HOSP IP/OBS DSCHRG MGMT >30: CPT | Performed by: STUDENT IN AN ORGANIZED HEALTH CARE EDUCATION/TRAINING PROGRAM

## 2021-01-01 PROCEDURE — 80053 COMPREHEN METABOLIC PANEL: CPT | Performed by: EMERGENCY MEDICINE

## 2021-01-01 PROCEDURE — 85027 COMPLETE CBC AUTOMATED: CPT | Performed by: NURSE PRACTITIONER

## 2021-01-01 PROCEDURE — 93000 ELECTROCARDIOGRAM COMPLETE: CPT | Performed by: INTERNAL MEDICINE

## 2021-01-01 PROCEDURE — 36410 VNPNXR 3YR/> PHY/QHP DX/THER: CPT

## 2021-01-01 PROCEDURE — 97161 PT EVAL LOW COMPLEX 20 MIN: CPT | Performed by: PHYSICAL THERAPIST

## 2021-01-01 PROCEDURE — 83880 ASSAY OF NATRIURETIC PEPTIDE: CPT | Performed by: EMERGENCY MEDICINE

## 2021-01-01 PROCEDURE — 76882 US LMTD JT/FCL EVL NVASC XTR: CPT

## 2021-01-01 PROCEDURE — 99214 OFFICE O/P EST MOD 30 MIN: CPT | Performed by: INTERNAL MEDICINE

## 2021-01-01 RX ORDER — VENLAFAXINE HYDROCHLORIDE 75 MG/1
75 CAPSULE, EXTENDED RELEASE ORAL DAILY
Status: DISCONTINUED | OUTPATIENT
Start: 2021-01-01 | End: 2021-01-01 | Stop reason: HOSPADM

## 2021-01-01 RX ORDER — TRAZODONE HYDROCHLORIDE 50 MG/1
50 TABLET ORAL NIGHTLY
Status: DISCONTINUED | OUTPATIENT
Start: 2021-01-01 | End: 2021-01-01 | Stop reason: HOSPADM

## 2021-01-01 RX ORDER — METHYLPREDNISOLONE SODIUM SUCCINATE 125 MG/2ML
125 INJECTION, POWDER, LYOPHILIZED, FOR SOLUTION INTRAMUSCULAR; INTRAVENOUS ONCE
Status: COMPLETED | OUTPATIENT
Start: 2021-01-01 | End: 2021-01-01

## 2021-01-01 RX ORDER — GABAPENTIN 300 MG/1
300 CAPSULE ORAL 3 TIMES DAILY PRN
Status: DISCONTINUED | OUTPATIENT
Start: 2021-01-01 | End: 2021-01-01 | Stop reason: HOSPADM

## 2021-01-01 RX ORDER — OMEPRAZOLE 40 MG/1
CAPSULE, DELAYED RELEASE ORAL
Qty: 30 CAPSULE | Refills: 1 | Status: ON HOLD | OUTPATIENT
Start: 2021-01-01 | End: 2021-01-01

## 2021-01-01 RX ORDER — APIXABAN 5 MG/1
TABLET, FILM COATED ORAL
Qty: 60 TABLET | Refills: 4 | Status: SHIPPED | OUTPATIENT
Start: 2021-01-01 | End: 2021-01-01 | Stop reason: HOSPADM

## 2021-01-01 RX ORDER — BLOOD-GLUCOSE METER
EACH MISCELLANEOUS SEE ADMIN INSTRUCTIONS
COMMUNITY
Start: 2021-01-01

## 2021-01-01 RX ORDER — FERROUS SULFATE 325(65) MG
1 TABLET ORAL EVERY OTHER DAY
Qty: 60 TABLET | Refills: 0 | Status: SHIPPED | OUTPATIENT
Start: 2021-01-01

## 2021-01-01 RX ORDER — BUDESONIDE AND FORMOTEROL FUMARATE DIHYDRATE 80; 4.5 UG/1; UG/1
2 AEROSOL RESPIRATORY (INHALATION)
Qty: 10.2 G | Refills: 12 | Status: SHIPPED | OUTPATIENT
Start: 2021-01-01 | End: 2021-01-01 | Stop reason: CLARIF

## 2021-01-01 RX ORDER — METHYLPREDNISOLONE SODIUM SUCCINATE 125 MG/2ML
60 INJECTION, POWDER, LYOPHILIZED, FOR SOLUTION INTRAMUSCULAR; INTRAVENOUS EVERY 8 HOURS
Status: DISCONTINUED | OUTPATIENT
Start: 2021-01-01 | End: 2021-01-01

## 2021-01-01 RX ORDER — HYDROMORPHONE HYDROCHLORIDE 1 MG/ML
SOLUTION ORAL
Qty: 180 ML | Refills: 0 | Status: SHIPPED | OUTPATIENT
Start: 2021-01-01 | End: 2021-01-01 | Stop reason: SDUPTHER

## 2021-01-01 RX ORDER — GABAPENTIN 300 MG/1
300 CAPSULE ORAL EVERY 8 HOURS SCHEDULED
Status: DISCONTINUED | OUTPATIENT
Start: 2021-01-01 | End: 2021-01-01 | Stop reason: HOSPADM

## 2021-01-01 RX ORDER — INFLUENZA A VIRUS A/MICHIGAN/45/2015 X-275 (H1N1) ANTIGEN (FORMALDEHYDE INACTIVATED), INFLUENZA A VIRUS A/SINGAPORE/INFIMH-16-0019/2016 IVR-186 (H3N2) ANTIGEN (FORMALDEHYDE INACTIVATED), INFLUENZA B VIRUS B/PHUKET/3073/2013 ANTIGEN (FORMALDEHYDE INACTIVATED), AND INFLUENZA B VIRUS B/MARYLAND/15/2016 BX-69A ANTIGEN (FORMALDEHYDE INACTIVATED) 60; 60; 60; 60 UG/.7ML; UG/.7ML; UG/.7ML; UG/.7ML
INJECTION, SUSPENSION INTRAMUSCULAR SEE ADMIN INSTRUCTIONS
COMMUNITY
Start: 2020-01-01 | End: 2021-01-01

## 2021-01-01 RX ORDER — IPRATROPIUM BROMIDE AND ALBUTEROL SULFATE 2.5; .5 MG/3ML; MG/3ML
3 SOLUTION RESPIRATORY (INHALATION) ONCE
Status: COMPLETED | OUTPATIENT
Start: 2021-01-01 | End: 2021-01-01

## 2021-01-01 RX ORDER — METHYLPREDNISOLONE SODIUM SUCCINATE 125 MG/2ML
60 INJECTION, POWDER, LYOPHILIZED, FOR SOLUTION INTRAMUSCULAR; INTRAVENOUS EVERY 8 HOURS
Status: DISCONTINUED | OUTPATIENT
Start: 2021-01-01 | End: 2021-01-01 | Stop reason: HOSPADM

## 2021-01-01 RX ORDER — HYDROCODONE BITARTRATE AND ACETAMINOPHEN 7.5; 325 MG/1; MG/1
TABLET ORAL
Qty: 12 TABLET | Refills: 0 | Status: SHIPPED | OUTPATIENT
Start: 2021-01-01

## 2021-01-01 RX ORDER — DEXTROSE MONOHYDRATE 25 G/50ML
25 INJECTION, SOLUTION INTRAVENOUS
Status: DISCONTINUED | OUTPATIENT
Start: 2021-01-01 | End: 2021-01-01 | Stop reason: HOSPADM

## 2021-01-01 RX ORDER — IPRATROPIUM BROMIDE AND ALBUTEROL SULFATE 2.5; .5 MG/3ML; MG/3ML
3 SOLUTION RESPIRATORY (INHALATION)
Status: DISCONTINUED | OUTPATIENT
Start: 2021-01-01 | End: 2021-01-01 | Stop reason: HOSPADM

## 2021-01-01 RX ORDER — SODIUM CHLORIDE 0.9 % (FLUSH) 0.9 %
10 SYRINGE (ML) INJECTION EVERY 12 HOURS SCHEDULED
Status: DISCONTINUED | OUTPATIENT
Start: 2021-01-01 | End: 2021-01-01 | Stop reason: HOSPADM

## 2021-01-01 RX ORDER — BISACODYL 5 MG
5 TABLET, DELAYED RELEASE (ENTERIC COATED) ORAL DAILY PRN
Qty: 30 TABLET | Refills: 0 | Status: SHIPPED | OUTPATIENT
Start: 2021-01-01

## 2021-01-01 RX ORDER — HYDROMORPHONE HYDROCHLORIDE 1 MG/ML
SOLUTION ORAL
Qty: 60 ML | Refills: 0 | Status: SHIPPED | OUTPATIENT
Start: 2021-01-01

## 2021-01-01 RX ORDER — ALBUTEROL SULFATE 2.5 MG/3ML
2.5 SOLUTION RESPIRATORY (INHALATION) EVERY 6 HOURS PRN
Status: DISCONTINUED | OUTPATIENT
Start: 2021-01-01 | End: 2021-01-01 | Stop reason: HOSPADM

## 2021-01-01 RX ORDER — LORAZEPAM 0.5 MG/1
0.5 TABLET ORAL EVERY 6 HOURS PRN
Qty: 30 TABLET | Refills: 0 | Status: SHIPPED | OUTPATIENT
Start: 2021-01-01

## 2021-01-01 RX ORDER — HYDROMORPHONE HYDROCHLORIDE 1 MG/ML
SOLUTION ORAL
Qty: 30 ML | Refills: 0 | Status: SHIPPED | OUTPATIENT
Start: 2021-01-01 | End: 2021-01-01 | Stop reason: SDUPTHER

## 2021-01-01 RX ORDER — ONDANSETRON 2 MG/ML
4 INJECTION INTRAMUSCULAR; INTRAVENOUS EVERY 6 HOURS PRN
Status: DISCONTINUED | OUTPATIENT
Start: 2021-01-01 | End: 2021-01-01 | Stop reason: HOSPADM

## 2021-01-01 RX ORDER — GABAPENTIN 300 MG/1
300 CAPSULE ORAL 3 TIMES DAILY PRN
Qty: 90 CAPSULE | Refills: 1 | Status: SHIPPED | OUTPATIENT
Start: 2021-01-01

## 2021-01-01 RX ORDER — PANTOPRAZOLE SODIUM 40 MG/1
40 TABLET, DELAYED RELEASE ORAL EVERY MORNING
Refills: 1 | Status: DISCONTINUED | OUTPATIENT
Start: 2021-01-01 | End: 2021-01-01 | Stop reason: HOSPADM

## 2021-01-01 RX ORDER — ACETAMINOPHEN 325 MG/1
650 TABLET ORAL EVERY 4 HOURS PRN
Status: DISCONTINUED | OUTPATIENT
Start: 2021-01-01 | End: 2021-01-01 | Stop reason: HOSPADM

## 2021-01-01 RX ORDER — TRAZODONE HYDROCHLORIDE 50 MG/1
TABLET ORAL
Qty: 90 TABLET | Refills: 1 | Status: SHIPPED | OUTPATIENT
Start: 2021-01-01

## 2021-01-01 RX ORDER — LANOLIN ALCOHOL/MO/W.PET/CERES
3 CREAM (GRAM) TOPICAL NIGHTLY PRN
Status: DISCONTINUED | OUTPATIENT
Start: 2021-01-01 | End: 2021-01-01 | Stop reason: HOSPADM

## 2021-01-01 RX ORDER — LISINOPRIL 2.5 MG/1
2.5 TABLET ORAL DAILY
Status: DISCONTINUED | OUTPATIENT
Start: 2021-01-01 | End: 2021-01-01 | Stop reason: HOSPADM

## 2021-01-01 RX ORDER — LORAZEPAM 0.5 MG/1
0.5 TABLET ORAL ONCE
Status: DISCONTINUED | OUTPATIENT
Start: 2021-01-01 | End: 2021-01-01

## 2021-01-01 RX ORDER — OXYBUTYNIN CHLORIDE 5 MG/1
5 TABLET, EXTENDED RELEASE ORAL DAILY
Refills: 3 | Status: DISCONTINUED | OUTPATIENT
Start: 2021-01-01 | End: 2021-01-01 | Stop reason: HOSPADM

## 2021-01-01 RX ORDER — NICOTINE POLACRILEX 4 MG
15 LOZENGE BUCCAL
Status: DISCONTINUED | OUTPATIENT
Start: 2021-01-01 | End: 2021-01-01 | Stop reason: HOSPADM

## 2021-01-01 RX ORDER — BUDESONIDE AND FORMOTEROL FUMARATE DIHYDRATE 80; 4.5 UG/1; UG/1
2 AEROSOL RESPIRATORY (INHALATION)
Status: DISCONTINUED | OUTPATIENT
Start: 2021-01-01 | End: 2021-01-01 | Stop reason: HOSPADM

## 2021-01-01 RX ORDER — LANCETS
1 EACH MISCELLANEOUS 3 TIMES DAILY
Qty: 100 EACH | Refills: 12 | Status: SHIPPED | OUTPATIENT
Start: 2021-01-01

## 2021-01-01 RX ORDER — DILTIAZEM HYDROCHLORIDE 60 MG/1
2 TABLET, FILM COATED ORAL
COMMUNITY
Start: 2021-01-01

## 2021-01-01 RX ORDER — HYDROCODONE BITARTRATE AND ACETAMINOPHEN 7.5; 325 MG/1; MG/1
1 TABLET ORAL EVERY 6 HOURS PRN
Qty: 12 TABLET | Refills: 0 | Status: SHIPPED | OUTPATIENT
Start: 2021-01-01 | End: 2021-01-01 | Stop reason: SDUPTHER

## 2021-01-01 RX ORDER — HYDROCODONE BITARTRATE AND ACETAMINOPHEN 10; 325 MG/1; MG/1
1 TABLET ORAL EVERY 4 HOURS PRN
Qty: 18 TABLET | Refills: 0 | Status: SHIPPED | OUTPATIENT
Start: 2021-01-01 | End: 2021-01-01

## 2021-01-01 RX ORDER — BLOOD-GLUCOSE METER
1 KIT MISCELLANEOUS 3 TIMES DAILY
Qty: 1 EACH | Refills: 0 | Status: SHIPPED | OUTPATIENT
Start: 2021-01-01

## 2021-01-01 RX ORDER — HYDROCODONE BITARTRATE AND ACETAMINOPHEN 7.5; 325 MG/1; MG/1
TABLET ORAL
Qty: 12 TABLET | Refills: 0 | OUTPATIENT
Start: 2021-01-01

## 2021-01-01 RX ORDER — SODIUM CHLORIDE 0.9 % (FLUSH) 0.9 %
10 SYRINGE (ML) INJECTION AS NEEDED
Status: DISCONTINUED | OUTPATIENT
Start: 2021-01-01 | End: 2021-01-01 | Stop reason: HOSPADM

## 2021-01-01 RX ORDER — HYDROCODONE BITARTRATE AND ACETAMINOPHEN 7.5; 325 MG/1; MG/1
1 TABLET ORAL EVERY 6 HOURS PRN
Status: DISCONTINUED | OUTPATIENT
Start: 2021-01-01 | End: 2021-01-01 | Stop reason: HOSPADM

## 2021-01-01 RX ORDER — LORAZEPAM 2 MG/ML
0.5 INJECTION INTRAMUSCULAR ONCE
Status: DISCONTINUED | OUTPATIENT
Start: 2021-01-01 | End: 2021-01-01

## 2021-01-01 RX ORDER — CHOLECALCIFEROL (VITAMIN D3) 50 MCG
CAPSULE ORAL
Qty: 60 CAPSULE | Refills: 4 | Status: SHIPPED | OUTPATIENT
Start: 2021-01-01

## 2021-01-01 RX ORDER — PREDNISONE 20 MG/1
40 TABLET ORAL DAILY
Status: DISCONTINUED | OUTPATIENT
Start: 2021-01-01 | End: 2021-01-01

## 2021-01-01 RX ORDER — FERROUS SULFATE 325(65) MG
TABLET ORAL
Qty: 60 TABLET | Refills: 4 | Status: ON HOLD | OUTPATIENT
Start: 2021-01-01 | End: 2021-01-01 | Stop reason: SDUPTHER

## 2021-01-01 RX ORDER — METHYLPREDNISOLONE SODIUM SUCCINATE 125 MG/2ML
80 INJECTION, POWDER, LYOPHILIZED, FOR SOLUTION INTRAMUSCULAR; INTRAVENOUS ONCE
Status: COMPLETED | OUTPATIENT
Start: 2021-01-01 | End: 2021-01-01

## 2021-01-01 RX ORDER — GABAPENTIN 100 MG/1
100 CAPSULE ORAL 3 TIMES DAILY PRN
Qty: 60 CAPSULE | Refills: 0 | Status: SHIPPED | OUTPATIENT
Start: 2021-01-01 | End: 2021-01-01

## 2021-01-01 RX ORDER — MAGNESIUM SULFATE HEPTAHYDRATE 40 MG/ML
2 INJECTION, SOLUTION INTRAVENOUS ONCE
Status: COMPLETED | OUTPATIENT
Start: 2021-01-01 | End: 2021-01-01

## 2021-01-01 RX ORDER — PREDNISONE 20 MG/1
40 TABLET ORAL DAILY
Qty: 6 TABLET | Refills: 0 | Status: SHIPPED | OUTPATIENT
Start: 2021-01-01 | End: 2021-01-01

## 2021-01-01 RX ORDER — FERROUS SULFATE TAB EC 324 MG (65 MG FE EQUIVALENT) 324 (65 FE) MG
324 TABLET DELAYED RESPONSE ORAL
Status: DISCONTINUED | OUTPATIENT
Start: 2021-01-01 | End: 2021-01-01 | Stop reason: HOSPADM

## 2021-01-01 RX ORDER — LORAZEPAM 0.5 MG/1
0.5 TABLET ORAL EVERY 8 HOURS PRN
Status: DISCONTINUED | OUTPATIENT
Start: 2021-01-01 | End: 2021-01-01

## 2021-01-01 RX ORDER — HYDROMORPHONE HYDROCHLORIDE 1 MG/ML
SOLUTION ORAL
Qty: 240 ML | Refills: 0 | Status: SHIPPED | OUTPATIENT
Start: 2021-01-01 | End: 2021-01-01 | Stop reason: SDUPTHER

## 2021-01-01 RX ORDER — BISACODYL 5 MG/1
5 TABLET, DELAYED RELEASE ORAL DAILY PRN
Status: DISCONTINUED | OUTPATIENT
Start: 2021-01-01 | End: 2021-01-01 | Stop reason: HOSPADM

## 2021-01-01 RX ORDER — ALBUTEROL SULFATE 2.5 MG/3ML
1.25 SOLUTION RESPIRATORY (INHALATION) EVERY 6 HOURS PRN
Refills: 12 | Status: DISCONTINUED | OUTPATIENT
Start: 2021-01-01 | End: 2021-01-01 | Stop reason: SDUPTHER

## 2021-01-01 RX ORDER — INSULIN GLARGINE 100 [IU]/ML
36 INJECTION, SOLUTION SUBCUTANEOUS NIGHTLY
Qty: 3 ML | Refills: 5
Start: 2021-01-01

## 2021-01-01 RX ORDER — OMEPRAZOLE 40 MG/1
CAPSULE, DELAYED RELEASE ORAL
Qty: 30 CAPSULE | Refills: 0 | Status: SHIPPED | OUTPATIENT
Start: 2021-01-01

## 2021-01-01 RX ORDER — VENLAFAXINE HYDROCHLORIDE 75 MG/1
CAPSULE, EXTENDED RELEASE ORAL
Qty: 90 CAPSULE | Refills: 2 | Status: SHIPPED | OUTPATIENT
Start: 2021-01-01

## 2021-01-01 RX ORDER — HYDROCODONE BITARTRATE AND ACETAMINOPHEN 7.5; 325 MG/1; MG/1
1 TABLET ORAL EVERY 6 HOURS PRN
Qty: 12 TABLET | Refills: 0 | Status: SHIPPED | OUTPATIENT
Start: 2021-01-01 | End: 2021-01-01

## 2021-01-01 RX ORDER — LORAZEPAM 0.5 MG/1
0.25 TABLET ORAL EVERY 8 HOURS PRN
Status: DISCONTINUED | OUTPATIENT
Start: 2021-01-01 | End: 2021-01-01

## 2021-01-01 RX ORDER — LORAZEPAM 2 MG/ML
0.5 INJECTION INTRAMUSCULAR ONCE
Status: COMPLETED | OUTPATIENT
Start: 2021-01-01 | End: 2021-01-01

## 2021-01-01 RX ORDER — ALBUTEROL SULFATE 1.25 MG/3ML
1 SOLUTION RESPIRATORY (INHALATION) EVERY 6 HOURS PRN
Qty: 30 EACH | Refills: 12 | Status: SHIPPED | OUTPATIENT
Start: 2021-01-01

## 2021-01-01 RX ORDER — AZITHROMYCIN 250 MG/1
500 TABLET, FILM COATED ORAL
Status: DISCONTINUED | OUTPATIENT
Start: 2021-01-01 | End: 2021-01-01

## 2021-01-01 RX ORDER — LORAZEPAM 2 MG/ML
0.5 INJECTION INTRAMUSCULAR EVERY 4 HOURS PRN
Status: DISCONTINUED | OUTPATIENT
Start: 2021-01-01 | End: 2021-01-01 | Stop reason: HOSPADM

## 2021-01-01 RX ORDER — LORAZEPAM 0.5 MG/1
0.5 TABLET ORAL AS NEEDED
Status: DISCONTINUED | OUTPATIENT
Start: 2021-01-01 | End: 2021-01-01

## 2021-01-01 RX ORDER — BUDESONIDE AND FORMOTEROL FUMARATE DIHYDRATE 80; 4.5 UG/1; UG/1
2 AEROSOL RESPIRATORY (INHALATION)
Refills: 5 | Status: DISCONTINUED | OUTPATIENT
Start: 2021-01-01 | End: 2021-01-01 | Stop reason: SDUPTHER

## 2021-01-01 RX ORDER — NICOTINE 21 MG/24HR
1 PATCH, TRANSDERMAL 24 HOURS TRANSDERMAL EVERY 24 HOURS
Status: DISCONTINUED | OUTPATIENT
Start: 2021-01-01 | End: 2021-01-01 | Stop reason: HOSPADM

## 2021-01-01 RX ORDER — HYDROCODONE BITARTRATE AND ACETAMINOPHEN 10; 325 MG/1; MG/1
1 TABLET ORAL EVERY 4 HOURS PRN
Qty: 18 TABLET | Refills: 0 | Status: CANCELLED | OUTPATIENT
Start: 2021-01-01 | End: 2021-01-01

## 2021-01-01 RX ORDER — LISINOPRIL 5 MG/1
TABLET ORAL
Qty: 90 TABLET | Refills: 2 | Status: SHIPPED | OUTPATIENT
Start: 2021-01-01

## 2021-01-01 RX ORDER — BLOOD SUGAR DIAGNOSTIC
STRIP MISCELLANEOUS
Qty: 100 EACH | Refills: 11 | Status: SHIPPED | OUTPATIENT
Start: 2021-01-01 | End: 2021-01-01 | Stop reason: ALTCHOICE

## 2021-01-01 RX ADMIN — APIXABAN 5 MG: 5 TABLET, FILM COATED ORAL at 21:29

## 2021-01-01 RX ADMIN — LISINOPRIL 2.5 MG: 2.5 TABLET ORAL at 08:44

## 2021-01-01 RX ADMIN — FERROUS SULFATE TAB EC 324 MG (65 MG FE EQUIVALENT) 324 MG: 324 (65 FE) TABLET DELAYED RESPONSE at 17:16

## 2021-01-01 RX ADMIN — SODIUM CHLORIDE, PRESERVATIVE FREE 10 ML: 5 INJECTION INTRAVENOUS at 21:27

## 2021-01-01 RX ADMIN — IPRATROPIUM BROMIDE AND ALBUTEROL SULFATE 3 ML: 2.5; .5 SOLUTION RESPIRATORY (INHALATION) at 19:16

## 2021-01-01 RX ADMIN — IPRATROPIUM BROMIDE AND ALBUTEROL SULFATE 3 ML: 2.5; .5 SOLUTION RESPIRATORY (INHALATION) at 08:11

## 2021-01-01 RX ADMIN — BUDESONIDE AND FORMOTEROL FUMARATE DIHYDRATE 2 PUFF: 80; 4.5 AEROSOL RESPIRATORY (INHALATION) at 08:19

## 2021-01-01 RX ADMIN — PANTOPRAZOLE SODIUM 40 MG: 40 TABLET, DELAYED RELEASE ORAL at 06:22

## 2021-01-01 RX ADMIN — METHOCARBAMOL 750 MG: 750 TABLET, FILM COATED ORAL at 21:41

## 2021-01-01 RX ADMIN — IPRATROPIUM BROMIDE AND ALBUTEROL SULFATE 3 ML: 2.5; .5 SOLUTION RESPIRATORY (INHALATION) at 07:29

## 2021-01-01 RX ADMIN — HYDROCODONE BITARTRATE AND ACETAMINOPHEN 1 TABLET: 10; 325 TABLET ORAL at 09:16

## 2021-01-01 RX ADMIN — IPRATROPIUM BROMIDE AND ALBUTEROL SULFATE 3 ML: 2.5; .5 SOLUTION RESPIRATORY (INHALATION) at 14:47

## 2021-01-01 RX ADMIN — BUDESONIDE AND FORMOTEROL FUMARATE DIHYDRATE 2 PUFF: 80; 4.5 AEROSOL RESPIRATORY (INHALATION) at 20:30

## 2021-01-01 RX ADMIN — IPRATROPIUM BROMIDE AND ALBUTEROL SULFATE 3 ML: 2.5; .5 SOLUTION RESPIRATORY (INHALATION) at 07:48

## 2021-01-01 RX ADMIN — METOPROLOL TARTRATE 25 MG: 25 TABLET, FILM COATED ORAL at 21:08

## 2021-01-01 RX ADMIN — APIXABAN 5 MG: 5 TABLET, FILM COATED ORAL at 20:24

## 2021-01-01 RX ADMIN — LISINOPRIL 2.5 MG: 2.5 TABLET ORAL at 08:22

## 2021-01-01 RX ADMIN — ATORVASTATIN CALCIUM 10 MG: 10 TABLET, FILM COATED ORAL at 09:22

## 2021-01-01 RX ADMIN — INSULIN ASPART 2 UNITS: 100 INJECTION, SOLUTION INTRAVENOUS; SUBCUTANEOUS at 08:59

## 2021-01-01 RX ADMIN — PANTOPRAZOLE SODIUM 40 MG: 40 TABLET, DELAYED RELEASE ORAL at 06:28

## 2021-01-01 RX ADMIN — HYDROCODONE BITARTRATE AND ACETAMINOPHEN 1 TABLET: 10; 325 TABLET ORAL at 13:02

## 2021-01-01 RX ADMIN — VENLAFAXINE HYDROCHLORIDE 75 MG: 75 CAPSULE, EXTENDED RELEASE ORAL at 08:51

## 2021-01-01 RX ADMIN — INSULIN DETEMIR 30 UNITS: 100 INJECTION, SOLUTION SUBCUTANEOUS at 21:03

## 2021-01-01 RX ADMIN — Medication 1000 UNITS: at 09:13

## 2021-01-01 RX ADMIN — OXYBUTYNIN CHLORIDE 5 MG: 5 TABLET, EXTENDED RELEASE ORAL at 09:23

## 2021-01-01 RX ADMIN — METOPROLOL TARTRATE 25 MG: 25 TABLET, FILM COATED ORAL at 08:22

## 2021-01-01 RX ADMIN — HYDROCODONE BITARTRATE AND ACETAMINOPHEN 1 TABLET: 10; 325 TABLET ORAL at 17:16

## 2021-01-01 RX ADMIN — HYDROCODONE BITARTRATE AND ACETAMINOPHEN 1 TABLET: 10; 325 TABLET ORAL at 08:58

## 2021-01-01 RX ADMIN — BUDESONIDE AND FORMOTEROL FUMARATE DIHYDRATE 2 PUFF: 80; 4.5 AEROSOL RESPIRATORY (INHALATION) at 07:48

## 2021-01-01 RX ADMIN — SODIUM CHLORIDE, PRESERVATIVE FREE 10 ML: 5 INJECTION INTRAVENOUS at 09:23

## 2021-01-01 RX ADMIN — INSULIN DETEMIR 25 UNITS: 100 INJECTION, SOLUTION SUBCUTANEOUS at 21:18

## 2021-01-01 RX ADMIN — LISINOPRIL 2.5 MG: 2.5 TABLET ORAL at 11:07

## 2021-01-01 RX ADMIN — LORAZEPAM 0.5 MG: 2 INJECTION INTRAMUSCULAR; INTRAVENOUS at 12:12

## 2021-01-01 RX ADMIN — FERROUS SULFATE TAB EC 324 MG (65 MG FE EQUIVALENT) 324 MG: 324 (65 FE) TABLET DELAYED RESPONSE at 08:36

## 2021-01-01 RX ADMIN — INSULIN ASPART 6 UNITS: 100 INJECTION, SOLUTION INTRAVENOUS; SUBCUTANEOUS at 16:30

## 2021-01-01 RX ADMIN — FERROUS SULFATE TAB EC 324 MG (65 MG FE EQUIVALENT) 324 MG: 324 (65 FE) TABLET DELAYED RESPONSE at 08:23

## 2021-01-01 RX ADMIN — METOPROLOL TARTRATE 25 MG: 25 TABLET, FILM COATED ORAL at 08:06

## 2021-01-01 RX ADMIN — INSULIN ASPART 4 UNITS: 100 INJECTION, SOLUTION INTRAVENOUS; SUBCUTANEOUS at 18:03

## 2021-01-01 RX ADMIN — METHYLPREDNISOLONE SODIUM SUCCINATE 60 MG: 125 INJECTION, POWDER, FOR SOLUTION INTRAMUSCULAR; INTRAVENOUS at 23:13

## 2021-01-01 RX ADMIN — APIXABAN 5 MG: 5 TABLET, FILM COATED ORAL at 11:07

## 2021-01-01 RX ADMIN — INSULIN ASPART 2 UNITS: 100 INJECTION, SOLUTION INTRAVENOUS; SUBCUTANEOUS at 08:21

## 2021-01-01 RX ADMIN — INSULIN DETEMIR 15 UNITS: 100 INJECTION, SOLUTION SUBCUTANEOUS at 20:38

## 2021-01-01 RX ADMIN — SODIUM CHLORIDE, PRESERVATIVE FREE 10 ML: 5 INJECTION INTRAVENOUS at 21:09

## 2021-01-01 RX ADMIN — IPRATROPIUM BROMIDE AND ALBUTEROL SULFATE 3 ML: 2.5; .5 SOLUTION RESPIRATORY (INHALATION) at 08:28

## 2021-01-01 RX ADMIN — BUDESONIDE AND FORMOTEROL FUMARATE DIHYDRATE 2 PUFF: 160; 4.5 AEROSOL RESPIRATORY (INHALATION) at 20:06

## 2021-01-01 RX ADMIN — FERROUS SULFATE TAB EC 324 MG (65 MG FE EQUIVALENT) 324 MG: 324 (65 FE) TABLET DELAYED RESPONSE at 08:44

## 2021-01-01 RX ADMIN — PANTOPRAZOLE SODIUM 40 MG: 40 TABLET, DELAYED RELEASE ORAL at 06:24

## 2021-01-01 RX ADMIN — VENLAFAXINE HYDROCHLORIDE 75 MG: 75 CAPSULE, EXTENDED RELEASE ORAL at 09:22

## 2021-01-01 RX ADMIN — INSULIN ASPART 4 UNITS: 100 INJECTION, SOLUTION INTRAVENOUS; SUBCUTANEOUS at 11:40

## 2021-01-01 RX ADMIN — PREDNISONE 40 MG: 20 TABLET ORAL at 08:06

## 2021-01-01 RX ADMIN — INSULIN ASPART 5 UNITS: 100 INJECTION, SOLUTION INTRAVENOUS; SUBCUTANEOUS at 18:33

## 2021-01-01 RX ADMIN — Medication 1000 UNITS: at 09:22

## 2021-01-01 RX ADMIN — INSULIN DETEMIR 15 UNITS: 100 INJECTION, SOLUTION SUBCUTANEOUS at 21:24

## 2021-01-01 RX ADMIN — HYDROCODONE BITARTRATE AND ACETAMINOPHEN 1 TABLET: 10; 325 TABLET ORAL at 03:29

## 2021-01-01 RX ADMIN — PANTOPRAZOLE SODIUM 40 MG: 40 TABLET, DELAYED RELEASE ORAL at 06:21

## 2021-01-01 RX ADMIN — METOPROLOL TARTRATE 25 MG: 25 TABLET, FILM COATED ORAL at 21:24

## 2021-01-01 RX ADMIN — LISINOPRIL 2.5 MG: 2.5 TABLET ORAL at 08:50

## 2021-01-01 RX ADMIN — CEFTRIAXONE SODIUM 2 G: 1 INJECTION, POWDER, FOR SOLUTION INTRAMUSCULAR; INTRAVENOUS at 00:28

## 2021-01-01 RX ADMIN — CEFTRIAXONE SODIUM 2 G: 2 INJECTION, POWDER, FOR SOLUTION INTRAMUSCULAR; INTRAVENOUS at 13:50

## 2021-01-01 RX ADMIN — APIXABAN 5 MG: 5 TABLET, FILM COATED ORAL at 20:36

## 2021-01-01 RX ADMIN — APIXABAN 5 MG: 5 TABLET, FILM COATED ORAL at 10:19

## 2021-01-01 RX ADMIN — METHOCARBAMOL 750 MG: 750 TABLET, FILM COATED ORAL at 10:05

## 2021-01-01 RX ADMIN — VENLAFAXINE HYDROCHLORIDE 75 MG: 75 CAPSULE, EXTENDED RELEASE ORAL at 08:44

## 2021-01-01 RX ADMIN — LORAZEPAM 0.5 MG: 2 INJECTION INTRAMUSCULAR; INTRAVENOUS at 15:09

## 2021-01-01 RX ADMIN — BETAMETHASONE DIPROPIONATE: 0.5 CREAM TOPICAL at 20:26

## 2021-01-01 RX ADMIN — BETAMETHASONE DIPROPIONATE: 0.5 CREAM TOPICAL at 21:08

## 2021-01-01 RX ADMIN — IPRATROPIUM BROMIDE AND ALBUTEROL SULFATE 3 ML: 2.5; .5 SOLUTION RESPIRATORY (INHALATION) at 11:34

## 2021-01-01 RX ADMIN — GABAPENTIN 300 MG: 300 CAPSULE ORAL at 20:36

## 2021-01-01 RX ADMIN — IPRATROPIUM BROMIDE AND ALBUTEROL SULFATE 3 ML: 2.5; .5 SOLUTION RESPIRATORY (INHALATION) at 16:14

## 2021-01-01 RX ADMIN — OXYBUTYNIN CHLORIDE 5 MG: 5 TABLET, EXTENDED RELEASE ORAL at 08:24

## 2021-01-01 RX ADMIN — BUDESONIDE AND FORMOTEROL FUMARATE DIHYDRATE 2 PUFF: 80; 4.5 AEROSOL RESPIRATORY (INHALATION) at 20:01

## 2021-01-01 RX ADMIN — METOPROLOL TARTRATE 25 MG: 25 TABLET, FILM COATED ORAL at 20:36

## 2021-01-01 RX ADMIN — HYDROMORPHONE HYDROCHLORIDE 0.5 MG: 1 INJECTION, SOLUTION INTRAMUSCULAR; INTRAVENOUS; SUBCUTANEOUS at 14:24

## 2021-01-01 RX ADMIN — INSULIN ASPART 2 UNITS: 100 INJECTION, SOLUTION INTRAVENOUS; SUBCUTANEOUS at 17:26

## 2021-01-01 RX ADMIN — VENLAFAXINE HYDROCHLORIDE 75 MG: 75 CAPSULE, EXTENDED RELEASE ORAL at 08:22

## 2021-01-01 RX ADMIN — VENLAFAXINE HYDROCHLORIDE 75 MG: 75 CAPSULE, EXTENDED RELEASE ORAL at 08:36

## 2021-01-01 RX ADMIN — OXYBUTYNIN CHLORIDE 5 MG: 5 TABLET, EXTENDED RELEASE ORAL at 08:48

## 2021-01-01 RX ADMIN — INSULIN ASPART 2 UNITS: 100 INJECTION, SOLUTION INTRAVENOUS; SUBCUTANEOUS at 11:31

## 2021-01-01 RX ADMIN — IPRATROPIUM BROMIDE AND ALBUTEROL SULFATE 3 ML: 2.5; .5 SOLUTION RESPIRATORY (INHALATION) at 15:20

## 2021-01-01 RX ADMIN — FERROUS SULFATE TAB EC 324 MG (65 MG FE EQUIVALENT) 324 MG: 324 (65 FE) TABLET DELAYED RESPONSE at 17:33

## 2021-01-01 RX ADMIN — OXYBUTYNIN CHLORIDE 5 MG: 5 TABLET, EXTENDED RELEASE ORAL at 08:36

## 2021-01-01 RX ADMIN — FERROUS SULFATE TAB EC 324 MG (65 MG FE EQUIVALENT) 324 MG: 324 (65 FE) TABLET DELAYED RESPONSE at 09:13

## 2021-01-01 RX ADMIN — GABAPENTIN 300 MG: 300 CAPSULE ORAL at 06:07

## 2021-01-01 RX ADMIN — IPRATROPIUM BROMIDE AND ALBUTEROL SULFATE 3 ML: 2.5; .5 SOLUTION RESPIRATORY (INHALATION) at 20:22

## 2021-01-01 RX ADMIN — LISINOPRIL 2.5 MG: 2.5 TABLET ORAL at 09:22

## 2021-01-01 RX ADMIN — OXYBUTYNIN CHLORIDE 5 MG: 5 TABLET, EXTENDED RELEASE ORAL at 08:51

## 2021-01-01 RX ADMIN — TRAZODONE HYDROCHLORIDE 50 MG: 50 TABLET ORAL at 21:29

## 2021-01-01 RX ADMIN — GABAPENTIN 300 MG: 300 CAPSULE ORAL at 15:20

## 2021-01-01 RX ADMIN — METOPROLOL TARTRATE 25 MG: 25 TABLET, FILM COATED ORAL at 08:48

## 2021-01-01 RX ADMIN — INSULIN ASPART 3 UNITS: 100 INJECTION, SOLUTION INTRAVENOUS; SUBCUTANEOUS at 11:01

## 2021-01-01 RX ADMIN — FERROUS SULFATE TAB EC 324 MG (65 MG FE EQUIVALENT) 324 MG: 324 (65 FE) TABLET DELAYED RESPONSE at 08:58

## 2021-01-01 RX ADMIN — APIXABAN 5 MG: 5 TABLET, FILM COATED ORAL at 21:23

## 2021-01-01 RX ADMIN — TRAZODONE HYDROCHLORIDE 50 MG: 50 TABLET ORAL at 21:08

## 2021-01-01 RX ADMIN — VENLAFAXINE HYDROCHLORIDE 75 MG: 75 CAPSULE, EXTENDED RELEASE ORAL at 09:13

## 2021-01-01 RX ADMIN — METHYLPREDNISOLONE SODIUM SUCCINATE 60 MG: 125 INJECTION, POWDER, FOR SOLUTION INTRAMUSCULAR; INTRAVENOUS at 08:50

## 2021-01-01 RX ADMIN — INSULIN DETEMIR 30 UNITS: 100 INJECTION, SOLUTION SUBCUTANEOUS at 20:24

## 2021-01-01 RX ADMIN — NICOTINE 1 PATCH: 21 PATCH, EXTENDED RELEASE TRANSDERMAL at 21:17

## 2021-01-01 RX ADMIN — GABAPENTIN 300 MG: 300 CAPSULE ORAL at 21:17

## 2021-01-01 RX ADMIN — SODIUM CHLORIDE, PRESERVATIVE FREE 10 ML: 5 INJECTION INTRAVENOUS at 09:00

## 2021-01-01 RX ADMIN — ONDANSETRON 4 MG: 2 INJECTION INTRAMUSCULAR; INTRAVENOUS at 15:03

## 2021-01-01 RX ADMIN — INSULIN ASPART 6 UNITS: 100 INJECTION, SOLUTION INTRAVENOUS; SUBCUTANEOUS at 11:10

## 2021-01-01 RX ADMIN — METOPROLOL TARTRATE 25 MG: 25 TABLET, FILM COATED ORAL at 08:58

## 2021-01-01 RX ADMIN — METOPROLOL TARTRATE 25 MG: 25 TABLET, FILM COATED ORAL at 08:44

## 2021-01-01 RX ADMIN — HYDROCODONE BITARTRATE AND ACETAMINOPHEN 1 TABLET: 10; 325 TABLET ORAL at 01:13

## 2021-01-01 RX ADMIN — METOPROLOL TARTRATE 25 MG: 25 TABLET, FILM COATED ORAL at 08:51

## 2021-01-01 RX ADMIN — INSULIN ASPART 2 UNITS: 100 INJECTION, SOLUTION INTRAVENOUS; SUBCUTANEOUS at 17:34

## 2021-01-01 RX ADMIN — TRAZODONE HYDROCHLORIDE 50 MG: 50 TABLET ORAL at 20:24

## 2021-01-01 RX ADMIN — IPRATROPIUM BROMIDE AND ALBUTEROL SULFATE 3 ML: 2.5; .5 SOLUTION RESPIRATORY (INHALATION) at 10:15

## 2021-01-01 RX ADMIN — METOPROLOL TARTRATE 25 MG: 25 TABLET, FILM COATED ORAL at 08:37

## 2021-01-01 RX ADMIN — FERROUS SULFATE TAB EC 324 MG (65 MG FE EQUIVALENT) 324 MG: 324 (65 FE) TABLET DELAYED RESPONSE at 08:06

## 2021-01-01 RX ADMIN — ATORVASTATIN CALCIUM 10 MG: 10 TABLET, FILM COATED ORAL at 08:48

## 2021-01-01 RX ADMIN — LORAZEPAM 0.5 MG: 0.5 TABLET ORAL at 07:21

## 2021-01-01 RX ADMIN — SODIUM CHLORIDE, PRESERVATIVE FREE 10 ML: 5 INJECTION INTRAVENOUS at 20:40

## 2021-01-01 RX ADMIN — BUDESONIDE AND FORMOTEROL FUMARATE DIHYDRATE 2 PUFF: 160; 4.5 AEROSOL RESPIRATORY (INHALATION) at 09:00

## 2021-01-01 RX ADMIN — Medication 1000 UNITS: at 08:58

## 2021-01-01 RX ADMIN — METOPROLOL TARTRATE 25 MG: 25 TABLET, FILM COATED ORAL at 09:22

## 2021-01-01 RX ADMIN — METHYLPREDNISOLONE SODIUM SUCCINATE 60 MG: 125 INJECTION, POWDER, FOR SOLUTION INTRAMUSCULAR; INTRAVENOUS at 08:45

## 2021-01-01 RX ADMIN — INSULIN ASPART 2 UNITS: 100 INJECTION, SOLUTION INTRAVENOUS; SUBCUTANEOUS at 08:51

## 2021-01-01 RX ADMIN — IPRATROPIUM BROMIDE AND ALBUTEROL SULFATE 3 ML: 2.5; .5 SOLUTION RESPIRATORY (INHALATION) at 20:02

## 2021-01-01 RX ADMIN — BUDESONIDE AND FORMOTEROL FUMARATE DIHYDRATE 2 PUFF: 160; 4.5 AEROSOL RESPIRATORY (INHALATION) at 07:55

## 2021-01-01 RX ADMIN — INSULIN ASPART 2 UNITS: 100 INJECTION, SOLUTION INTRAVENOUS; SUBCUTANEOUS at 09:13

## 2021-01-01 RX ADMIN — HYDROCODONE BITARTRATE AND ACETAMINOPHEN 1 TABLET: 10; 325 TABLET ORAL at 13:31

## 2021-01-01 RX ADMIN — BUDESONIDE AND FORMOTEROL FUMARATE DIHYDRATE 2 PUFF: 80; 4.5 AEROSOL RESPIRATORY (INHALATION) at 20:22

## 2021-01-01 RX ADMIN — METHOCARBAMOL 750 MG: 750 TABLET, FILM COATED ORAL at 17:16

## 2021-01-01 RX ADMIN — GABAPENTIN 300 MG: 300 CAPSULE ORAL at 13:39

## 2021-01-01 RX ADMIN — SODIUM CHLORIDE, PRESERVATIVE FREE 10 ML: 5 INJECTION INTRAVENOUS at 08:51

## 2021-01-01 RX ADMIN — SODIUM CHLORIDE, PRESERVATIVE FREE 10 ML: 5 INJECTION INTRAVENOUS at 21:30

## 2021-01-01 RX ADMIN — ATORVASTATIN CALCIUM 10 MG: 10 TABLET, FILM COATED ORAL at 09:13

## 2021-01-01 RX ADMIN — Medication 1000 UNITS: at 08:48

## 2021-01-01 RX ADMIN — TRAZODONE HYDROCHLORIDE 50 MG: 50 TABLET ORAL at 21:24

## 2021-01-01 RX ADMIN — FERROUS SULFATE TAB EC 324 MG (65 MG FE EQUIVALENT) 324 MG: 324 (65 FE) TABLET DELAYED RESPONSE at 09:22

## 2021-01-01 RX ADMIN — HYDROCODONE BITARTRATE AND ACETAMINOPHEN 1 TABLET: 10; 325 TABLET ORAL at 17:33

## 2021-01-01 RX ADMIN — BETAMETHASONE DIPROPIONATE: 0.5 CREAM TOPICAL at 09:17

## 2021-01-01 RX ADMIN — METHYLPREDNISOLONE SODIUM SUCCINATE 80 MG: 125 INJECTION, POWDER, FOR SOLUTION INTRAMUSCULAR; INTRAVENOUS at 14:24

## 2021-01-01 RX ADMIN — AZITHROMYCIN MONOHYDRATE 500 MG: 250 TABLET ORAL at 08:36

## 2021-01-01 RX ADMIN — METOPROLOL TARTRATE 25 MG: 25 TABLET, FILM COATED ORAL at 09:13

## 2021-01-01 RX ADMIN — METOPROLOL TARTRATE 25 MG: 25 TABLET, FILM COATED ORAL at 21:17

## 2021-01-01 RX ADMIN — METHYLPREDNISOLONE SODIUM SUCCINATE 60 MG: 125 INJECTION, POWDER, FOR SOLUTION INTRAMUSCULAR; INTRAVENOUS at 15:21

## 2021-01-01 RX ADMIN — BUDESONIDE AND FORMOTEROL FUMARATE DIHYDRATE 2 PUFF: 160; 4.5 AEROSOL RESPIRATORY (INHALATION) at 08:29

## 2021-01-01 RX ADMIN — TRAZODONE HYDROCHLORIDE 50 MG: 50 TABLET ORAL at 20:47

## 2021-01-01 RX ADMIN — INSULIN ASPART 2 UNITS: 100 INJECTION, SOLUTION INTRAVENOUS; SUBCUTANEOUS at 08:49

## 2021-01-01 RX ADMIN — SODIUM CHLORIDE, PRESERVATIVE FREE 10 ML: 5 INJECTION INTRAVENOUS at 08:49

## 2021-01-01 RX ADMIN — HYDROCODONE BITARTRATE AND ACETAMINOPHEN 1 TABLET: 10; 325 TABLET ORAL at 08:49

## 2021-01-01 RX ADMIN — ATORVASTATIN CALCIUM 10 MG: 10 TABLET, FILM COATED ORAL at 08:58

## 2021-01-01 RX ADMIN — LISINOPRIL 2.5 MG: 2.5 TABLET ORAL at 08:58

## 2021-01-01 RX ADMIN — APIXABAN 5 MG: 5 TABLET, FILM COATED ORAL at 08:22

## 2021-01-01 RX ADMIN — METOPROLOL TARTRATE 25 MG: 25 TABLET, FILM COATED ORAL at 21:03

## 2021-01-01 RX ADMIN — NICOTINE 1 PATCH: 21 PATCH, EXTENDED RELEASE TRANSDERMAL at 21:25

## 2021-01-01 RX ADMIN — NICOTINE 1 PATCH: 21 PATCH, EXTENDED RELEASE TRANSDERMAL at 20:50

## 2021-01-01 RX ADMIN — APIXABAN 5 MG: 5 TABLET, FILM COATED ORAL at 08:51

## 2021-01-01 RX ADMIN — SODIUM CHLORIDE, PRESERVATIVE FREE 10 ML: 5 INJECTION INTRAVENOUS at 08:45

## 2021-01-01 RX ADMIN — MAGNESIUM SULFATE HEPTAHYDRATE 2 G: 40 INJECTION, SOLUTION INTRAVENOUS at 11:01

## 2021-01-01 RX ADMIN — INSULIN DETEMIR 30 UNITS: 100 INJECTION, SOLUTION SUBCUTANEOUS at 21:09

## 2021-01-01 RX ADMIN — BUDESONIDE AND FORMOTEROL FUMARATE DIHYDRATE 2 PUFF: 80; 4.5 AEROSOL RESPIRATORY (INHALATION) at 20:03

## 2021-01-01 RX ADMIN — OXYBUTYNIN CHLORIDE 5 MG: 5 TABLET, EXTENDED RELEASE ORAL at 08:06

## 2021-01-01 RX ADMIN — GABAPENTIN 300 MG: 300 CAPSULE ORAL at 06:21

## 2021-01-01 RX ADMIN — IPRATROPIUM BROMIDE AND ALBUTEROL SULFATE 3 ML: 2.5; .5 SOLUTION RESPIRATORY (INHALATION) at 11:50

## 2021-01-01 RX ADMIN — CEFTRIAXONE 2 G: 2 INJECTION, POWDER, FOR SOLUTION INTRAMUSCULAR; INTRAVENOUS at 00:19

## 2021-01-01 RX ADMIN — SODIUM CHLORIDE, PRESERVATIVE FREE 10 ML: 5 INJECTION INTRAVENOUS at 20:51

## 2021-01-01 RX ADMIN — OXYBUTYNIN CHLORIDE 5 MG: 5 TABLET, EXTENDED RELEASE ORAL at 09:16

## 2021-01-01 RX ADMIN — ALPRAZOLAM 0.25 MG: 0.25 TABLET ORAL at 20:24

## 2021-01-01 RX ADMIN — PREDNISONE 40 MG: 20 TABLET ORAL at 08:22

## 2021-01-01 RX ADMIN — FERROUS SULFATE TAB EC 324 MG (65 MG FE EQUIVALENT) 324 MG: 324 (65 FE) TABLET DELAYED RESPONSE at 08:48

## 2021-01-01 RX ADMIN — METHYLPREDNISOLONE SODIUM SUCCINATE 125 MG: 125 INJECTION, POWDER, FOR SOLUTION INTRAMUSCULAR; INTRAVENOUS at 11:01

## 2021-01-01 RX ADMIN — OXYBUTYNIN CHLORIDE 5 MG: 5 TABLET, EXTENDED RELEASE ORAL at 10:19

## 2021-01-01 RX ADMIN — GABAPENTIN 300 MG: 300 CAPSULE ORAL at 21:25

## 2021-01-01 RX ADMIN — NICOTINE 1 PATCH: 21 PATCH, EXTENDED RELEASE TRANSDERMAL at 21:32

## 2021-01-01 RX ADMIN — METHOCARBAMOL 750 MG: 750 TABLET, FILM COATED ORAL at 10:01

## 2021-01-01 RX ADMIN — IPRATROPIUM BROMIDE AND ALBUTEROL SULFATE 3 ML: 2.5; .5 SOLUTION RESPIRATORY (INHALATION) at 11:02

## 2021-01-01 RX ADMIN — INSULIN ASPART 2 UNITS: 100 INJECTION, SOLUTION INTRAVENOUS; SUBCUTANEOUS at 12:50

## 2021-01-01 RX ADMIN — INSULIN ASPART 4 UNITS: 100 INJECTION, SOLUTION INTRAVENOUS; SUBCUTANEOUS at 12:05

## 2021-01-01 RX ADMIN — IPRATROPIUM BROMIDE AND ALBUTEROL SULFATE 3 ML: 2.5; .5 SOLUTION RESPIRATORY (INHALATION) at 11:13

## 2021-01-01 RX ADMIN — LORAZEPAM 0.5 MG: 2 INJECTION INTRAMUSCULAR; INTRAVENOUS at 19:02

## 2021-01-01 RX ADMIN — PIPERACILLIN SODIUM AND TAZOBACTAM SODIUM 3.38 G: 3; .375 INJECTION, POWDER, LYOPHILIZED, FOR SOLUTION INTRAVENOUS at 16:32

## 2021-01-01 RX ADMIN — HYDROCODONE BITARTRATE AND ACETAMINOPHEN 1 TABLET: 10; 325 TABLET ORAL at 17:26

## 2021-01-01 RX ADMIN — OXYBUTYNIN CHLORIDE 5 MG: 5 TABLET, EXTENDED RELEASE ORAL at 08:58

## 2021-01-01 RX ADMIN — HYDROCODONE BITARTRATE AND ACETAMINOPHEN 1 TABLET: 10; 325 TABLET ORAL at 00:55

## 2021-01-01 RX ADMIN — LISINOPRIL 2.5 MG: 2.5 TABLET ORAL at 08:06

## 2021-01-01 RX ADMIN — FERROUS SULFATE TAB EC 324 MG (65 MG FE EQUIVALENT) 324 MG: 324 (65 FE) TABLET DELAYED RESPONSE at 08:50

## 2021-01-01 RX ADMIN — APIXABAN 5 MG: 5 TABLET, FILM COATED ORAL at 08:06

## 2021-01-01 RX ADMIN — APIXABAN 5 MG: 5 TABLET, FILM COATED ORAL at 20:47

## 2021-01-01 RX ADMIN — PANTOPRAZOLE SODIUM 40 MG: 40 TABLET, DELAYED RELEASE ORAL at 05:22

## 2021-01-01 RX ADMIN — APIXABAN 5 MG: 5 TABLET, FILM COATED ORAL at 08:48

## 2021-01-01 RX ADMIN — TRAZODONE HYDROCHLORIDE 50 MG: 50 TABLET ORAL at 20:36

## 2021-01-01 RX ADMIN — FERROUS SULFATE TAB EC 324 MG (65 MG FE EQUIVALENT) 324 MG: 324 (65 FE) TABLET DELAYED RESPONSE at 17:26

## 2021-01-01 RX ADMIN — GABAPENTIN 300 MG: 300 CAPSULE ORAL at 14:47

## 2021-01-01 RX ADMIN — BUDESONIDE AND FORMOTEROL FUMARATE DIHYDRATE 2 PUFF: 80; 4.5 AEROSOL RESPIRATORY (INHALATION) at 07:29

## 2021-01-01 RX ADMIN — CEFTRIAXONE 2 G: 2 INJECTION, POWDER, FOR SOLUTION INTRAMUSCULAR; INTRAVENOUS at 00:56

## 2021-01-01 RX ADMIN — PANTOPRAZOLE SODIUM 40 MG: 40 TABLET, DELAYED RELEASE ORAL at 06:07

## 2021-01-01 RX ADMIN — BUDESONIDE AND FORMOTEROL FUMARATE DIHYDRATE 2 PUFF: 80; 4.5 AEROSOL RESPIRATORY (INHALATION) at 08:28

## 2021-01-01 RX ADMIN — IOPAMIDOL 90 ML: 612 INJECTION, SOLUTION INTRAVENOUS at 17:01

## 2021-01-01 RX ADMIN — LISINOPRIL 2.5 MG: 2.5 TABLET ORAL at 08:48

## 2021-01-01 RX ADMIN — GABAPENTIN 300 MG: 300 CAPSULE ORAL at 06:24

## 2021-01-01 RX ADMIN — IPRATROPIUM BROMIDE AND ALBUTEROL SULFATE 3 ML: 2.5; .5 SOLUTION RESPIRATORY (INHALATION) at 15:10

## 2021-01-01 RX ADMIN — VENLAFAXINE HYDROCHLORIDE 75 MG: 75 CAPSULE, EXTENDED RELEASE ORAL at 08:06

## 2021-01-01 RX ADMIN — BUDESONIDE AND FORMOTEROL FUMARATE DIHYDRATE 2 PUFF: 160; 4.5 AEROSOL RESPIRATORY (INHALATION) at 20:52

## 2021-01-01 RX ADMIN — SODIUM CHLORIDE, PRESERVATIVE FREE 10 ML: 5 INJECTION INTRAVENOUS at 21:12

## 2021-01-01 RX ADMIN — INSULIN ASPART 6 UNITS: 100 INJECTION, SOLUTION INTRAVENOUS; SUBCUTANEOUS at 11:31

## 2021-01-01 RX ADMIN — AZITHROMYCIN MONOHYDRATE 500 MG: 500 INJECTION, POWDER, LYOPHILIZED, FOR SOLUTION INTRAVENOUS at 18:21

## 2021-01-01 RX ADMIN — METOPROLOL TARTRATE 25 MG: 25 TABLET, FILM COATED ORAL at 21:29

## 2021-01-01 RX ADMIN — PANTOPRAZOLE SODIUM 40 MG: 40 TABLET, DELAYED RELEASE ORAL at 05:51

## 2021-01-01 RX ADMIN — VENLAFAXINE HYDROCHLORIDE 75 MG: 75 CAPSULE, EXTENDED RELEASE ORAL at 08:58

## 2021-01-01 RX ADMIN — INSULIN DETEMIR 15 UNITS: 100 INJECTION, SOLUTION SUBCUTANEOUS at 21:50

## 2021-01-01 RX ADMIN — CEFTRIAXONE 2 G: 2 INJECTION, POWDER, FOR SOLUTION INTRAMUSCULAR; INTRAVENOUS at 00:06

## 2021-01-01 RX ADMIN — METOPROLOL TARTRATE 25 MG: 25 TABLET, FILM COATED ORAL at 20:24

## 2021-01-01 RX ADMIN — APIXABAN 5 MG: 5 TABLET, FILM COATED ORAL at 08:58

## 2021-01-01 RX ADMIN — HYDROMORPHONE HYDROCHLORIDE 0.5 MG: 1 INJECTION, SOLUTION INTRAMUSCULAR; INTRAVENOUS; SUBCUTANEOUS at 12:11

## 2021-01-01 RX ADMIN — PANTOPRAZOLE SODIUM 40 MG: 40 TABLET, DELAYED RELEASE ORAL at 06:04

## 2021-01-01 RX ADMIN — PANTOPRAZOLE SODIUM 40 MG: 40 TABLET, DELAYED RELEASE ORAL at 06:32

## 2021-01-01 RX ADMIN — METHOCARBAMOL 750 MG: 750 TABLET, FILM COATED ORAL at 16:25

## 2021-01-01 RX ADMIN — METOPROLOL TARTRATE 25 MG: 25 TABLET, FILM COATED ORAL at 20:47

## 2021-01-01 RX ADMIN — IPRATROPIUM BROMIDE AND ALBUTEROL SULFATE 3 ML: 2.5; .5 SOLUTION RESPIRATORY (INHALATION) at 20:30

## 2021-01-01 RX ADMIN — SODIUM CHLORIDE, PRESERVATIVE FREE 10 ML: 5 INJECTION INTRAVENOUS at 21:18

## 2021-01-01 RX ADMIN — APIXABAN 5 MG: 5 TABLET, FILM COATED ORAL at 21:08

## 2021-01-01 RX ADMIN — SODIUM CHLORIDE, PRESERVATIVE FREE 10 ML: 5 INJECTION INTRAVENOUS at 08:38

## 2021-01-01 RX ADMIN — PREDNISONE 40 MG: 20 TABLET ORAL at 08:36

## 2021-01-01 RX ADMIN — INSULIN ASPART 5 UNITS: 100 INJECTION, SOLUTION INTRAVENOUS; SUBCUTANEOUS at 17:15

## 2021-01-01 RX ADMIN — NICOTINE 1 PATCH: 21 PATCH, EXTENDED RELEASE TRANSDERMAL at 20:35

## 2021-01-01 RX ADMIN — INSULIN ASPART 5 UNITS: 100 INJECTION, SOLUTION INTRAVENOUS; SUBCUTANEOUS at 08:45

## 2021-01-01 RX ADMIN — TRAZODONE HYDROCHLORIDE 50 MG: 50 TABLET ORAL at 21:03

## 2021-01-01 RX ADMIN — BUDESONIDE AND FORMOTEROL FUMARATE DIHYDRATE 2 PUFF: 160; 4.5 AEROSOL RESPIRATORY (INHALATION) at 20:39

## 2021-01-01 RX ADMIN — IPRATROPIUM BROMIDE AND ALBUTEROL SULFATE 3 ML: 2.5; .5 SOLUTION RESPIRATORY (INHALATION) at 20:01

## 2021-01-01 RX ADMIN — VENLAFAXINE HYDROCHLORIDE 75 MG: 75 CAPSULE, EXTENDED RELEASE ORAL at 08:48

## 2021-01-01 RX ADMIN — APIXABAN 5 MG: 5 TABLET, FILM COATED ORAL at 21:03

## 2021-01-01 RX ADMIN — LISINOPRIL 2.5 MG: 2.5 TABLET ORAL at 09:13

## 2021-01-01 RX ADMIN — TRAZODONE HYDROCHLORIDE 50 MG: 50 TABLET ORAL at 21:17

## 2021-01-01 RX ADMIN — APIXABAN 5 MG: 5 TABLET, FILM COATED ORAL at 09:22

## 2021-01-01 RX ADMIN — MAGNESIUM SULFATE HEPTAHYDRATE 2 G: 40 INJECTION, SOLUTION INTRAVENOUS at 10:20

## 2021-01-01 RX ADMIN — INSULIN ASPART 4 UNITS: 100 INJECTION, SOLUTION INTRAVENOUS; SUBCUTANEOUS at 12:11

## 2021-01-01 RX ADMIN — IPRATROPIUM BROMIDE AND ALBUTEROL SULFATE 3 ML: 2.5; .5 SOLUTION RESPIRATORY (INHALATION) at 14:21

## 2021-01-01 RX ADMIN — APIXABAN 5 MG: 5 TABLET, FILM COATED ORAL at 12:17

## 2021-01-01 RX ADMIN — BUDESONIDE AND FORMOTEROL FUMARATE DIHYDRATE 2 PUFF: 80; 4.5 AEROSOL RESPIRATORY (INHALATION) at 10:15

## 2021-01-01 NOTE — PROGRESS NOTES
[facility] Medicine Services  INPATIENT PROGRESS NOTE    Length of Stay: 4  Date of Admission: 12/26/2020  Primary Care Physician: Avelino Fitzgerald APRN    Subjective   Chief Complaint: No complaints    HPI:    1/1/21:  Patient complained of back pain from being in the bed.  Encouraged to work with physical therapy today.      75 year old female with a history of COPD, cirrhosis, DM, HTN, and HLD who presents to the ED with fever and left flank pain.  CT of the abdomen revealed no evidence of uropathy.  Mediastinal lymphadenopathy was noted.  Chest x-ray revealed interstitial edema and/or infiltrates.  She was admitted for likely CAP and initiated on IV antiboitics.  Blood cultures grew Streptococcus infantarius.  Echocardiogram revealed small, mobile mass on the non-coronary cusp of the aortic valve measuring 8 mm.  Cardiology and CT surgery consulted and recommend 6 weeks of antibiotics.  GI was consulted and recommends EGD and colonoscopy.      12/31/20: Patient underwent EGD and colonoscopy today with no suspicious mass found.  6 polyps were removed and are pending biopsy.     Review of Systems   Constitutional: Negative for activity change and fatigue.   HENT: Negative for ear pain and sore throat.    Eyes: Negative for pain and discharge.   Respiratory: Negative for cough and shortness of breath.    Cardiovascular: Negative for chest pain and palpitations.   Gastrointestinal: Negative for abdominal pain and nausea.   Endocrine: Negative for cold intolerance and heat intolerance.   Genitourinary: Negative for difficulty urinating and dysuria.   Musculoskeletal: Negative for arthralgias and gait problem.   Skin: Negative for color change and rash.   Neurological: Negative for dizziness and weakness.   Psychiatric/Behavioral: Negative for agitation and confusion.        Objective    Temp:  [97.2 °F (36.2 °C)-99.3 °F (37.4 °C)] 98.1 °F (36.7 °C)  Heart Rate:  [67-86]  76  Resp:  [18-21] 18  BP: (109-150)/(53-72) 121/72    Physical Exam  Constitutional:       Appearance: She is well-developed.   HENT:      Head: Normocephalic and atraumatic.   Eyes:      Pupils: Pupils are equal, round, and reactive to light.   Neck:      Musculoskeletal: Normal range of motion and neck supple.   Cardiovascular:      Rate and Rhythm: Normal rate and regular rhythm.   Pulmonary:      Effort: Pulmonary effort is normal.      Breath sounds: Normal breath sounds.   Abdominal:      General: Bowel sounds are normal.      Palpations: Abdomen is soft.   Musculoskeletal: Normal range of motion.   Skin:     General: Skin is warm and dry.   Neurological:      Mental Status: She is alert and oriented to person, place, and time.   Psychiatric:         Behavior: Behavior normal.       Results Review:  I have reviewed the labs, radiology results, and diagnostic studies.    Laboratory Data:   Results from last 7 days   Lab Units 01/01/21  0645 12/31/20  0555 12/30/20  0555 12/26/20  1818   SODIUM mmol/L 135* 135* 137   < > 130*   POTASSIUM mmol/L 4.7 4.3 5.2   < > 4.1   CHLORIDE mmol/L 103 100 101   < > 97*   CO2 mmol/L 25.0 26.0 29.0   < > 23.0   BUN mg/dL 10 11 14   < > 14   CREATININE mg/dL 0.63 0.67 0.75   < > 0.82   GLUCOSE mg/dL 154* 173* 125*   < > 259*   CALCIUM mg/dL 9.8 9.9 9.7   < > 9.7   BILIRUBIN mg/dL  --   --   --   --  0.2   ALK PHOS U/L  --   --   --   --  85   ALT (SGPT) U/L  --   --   --   --  10   AST (SGOT) U/L  --   --   --   --  12   ANION GAP mmol/L 7.0 9.0 7.0   < > 10.0    < > = values in this interval not displayed.     Estimated Creatinine Clearance: 71.3 mL/min (by C-G formula based on SCr of 0.63 mg/dL).          Results from last 7 days   Lab Units 01/01/21  0645 12/31/20  0555 12/30/20  0555 12/29/20  0541 12/28/20  0539   WBC 10*3/mm3 8.16 7.18 7.38 8.42 8.17   HEMOGLOBIN g/dL 11.7* 11.3* 10.9* 10.6* 10.2*   HEMATOCRIT % 34.9 33.9* 33.9* 31.8* 32.0*   PLATELETS 10*3/mm3 349 306  309 251 234           Culture Data:   No results found for: BLOODCX  No results found for: URINECX  No results found for: RESPCX  No results found for: WOUNDCX  No results found for: STOOLCX  No components found for: BODYFLD    Radiology Data:   Imaging Results (Last 24 Hours)     ** No results found for the last 24 hours. **          I have reviewed the patient's current medications.     Assessment/Plan     Active Hospital Problems    Diagnosis   • **Endocarditis of aortic valve   • CAP (community acquired pneumonia)   • Streptococcal bacteremia   • Lymphadenopathy, mediastinal   • Fever   • Atrial flutter, paroxysmal (CMS/HCC)   • Gastroesophageal reflux disease without esophagitis   • Essential hypertension   • Diabetes mellitus, type II, insulin dependent (CMS/[facility])       Plan:    1.  Endocarditis of the aortic valve: Rocephin to 2 grams daily, will need 6 week course.  Case management working on getting home antibiotics set up.  Cardiology consult appreciated.  CT surgery consultation appreciated  2.  Bacteremia, streptococcus infantarius: GI consultation appreciated.  No obvious mass found on colonoscopy.  6 polyps have been removed and are pending biopsy.  3.  Hypertension: Continue lisinopril and lopressor.  4.  Paroxysmal atrial flutter: Continue Lopressor and Eliquis.    5.  Diabetes mellitus, type II: Continue Levemir and SSI.  6.  Deconditioning: Continue PT/OT.    Discharge planning: Cardiology 6 week follow-up with echocardiogram, CT surgery 6 week follow-up to assess mediastinal lymphadenopathy and consideration of biopsy.      Discharge Planning: I expect patient to be discharged to home in 1-2 days.      This document has been electronically signed by MAMTA Hamlin on January 1, 2021 10:31 CST

## 2021-01-01 NOTE — THERAPY TREATMENT NOTE
Patient Name: Michelle Pierce  : 1945    MRN: [id number]                              Today's Date: 2021       Admit Date: 2020    Visit Dx:     ICD-10-CM ICD-9-CM   1. Fever, unspecified fever cause  R50.9 780.60   2. Sepsis, due to unspecified organism, unspecified whether acute organ dysfunction present (CMS/[facility])  A41.9 038.9     995.91   3. Lung infiltrate  R91.8 793.19   4. Mediastinal lymphadenopathy  R59.0 785.6   5. Impaired mobility and ADLs  Z74.09 V49.89    Z78.9    6. Impaired functional mobility, balance, gait, and endurance  Z74.09 V49.89   7. Endocarditis of aortic valve  I35.8 424.1   8. Streptococcal bacteremia  R78.81 790.7    B95.5 041.00   9. Gastroesophageal reflux disease without esophagitis  K21.9 530.81     Patient Active Problem List   Diagnosis   • Diabetes mellitus, type II, insulin dependent (CMS/[facility])   • Essential hypertension   • Depression   • Tobacco dependence   • Sleep apnea   • Skin cancer   • Pain in both knees   • Primary osteoarthritis of both knees   • Insomnia   • Spinal instability, lumbar   • Spondylolisthesis, acquired   • Thoracic or lumbosacral neuritis or radiculitis, unspecified   • Hyperlipidemia   • Gastroesophageal reflux disease without esophagitis   • Overactive bladder   • Dyspnea on exertion   • RBBB   • Atrial flutter, paroxysmal (CMS/[facility])   • BMI 40.0-44.9, adult (CMS/[facility])   • Diabetes mellitus due to underlying condition with other skin complications (CMS/[facility])   • Chronic obstructive pulmonary disease (CMS/[facility])   • Bilateral carotid artery stenosis   • Fever   • CAP (community acquired pneumonia)   • Streptococcal bacteremia   • Lymphadenopathy, mediastinal   • Endocarditis of aortic valve     Past Medical History:   Diagnosis Date   • Arthritis of spine    • Chronic obstructive lung disease (CMS/[facility])    • Cirrhosis (CMS/[facility])    • Depression    • Diabetes mellitus (CMS/[facility])    • Hyperlipidemia    • Hypertension    • Neurologic disorder    •  Obesity    • Sleep apnea    • Tobacco dependence      Past Surgical History:   Procedure Laterality Date   • BACK SURGERY  2013    May 1 or 2, 2013   • COLONOSCOPY  01/21/2005   • COLONOSCOPY N/A 6/1/2017    Procedure: COLONOSCOPY;  Surgeon: Mat Jennings MD;  Location: [facility] ENDOSCOPY;  Service:    • ESOPHAGOSCOPY / EGD  02/25/1998    Mild esophagitis, mild gastritis, the most likely diagnosis for the esophageal inflammation is reflux esophagitis.   • EYE SURGERY      cataract, right eye   • FINGER SURGERY  04/07/1994    Partial amputation, right ring finger   • HYSTERECTOMY     • TUBAL ABDOMINAL LIGATION       General Information     Row Name 01/01/21 0941          OT Time and Intention    Document Type  therapy note (daily note)  -BB     Mode of Treatment  individual therapy;occupational therapy  -BB     Row Name 01/01/21 0941          General Information    Patient Profile Reviewed  yes  -BB     Existing Precautions/Restrictions  fall  -BB     Row Name 01/01/21 0941          Cognition    Orientation Status (Cognition)  oriented x 4  -BB     Row Name 01/01/21 0941          Safety Issues, Functional Mobility    Impairments Affecting Function (Mobility)  balance;endurance/activity tolerance;pain;strength;shortness of breath  -BB       User Key  (r) = Recorded By, (t) = Taken By, (c) = Cosigned By    Initials Name Provider Type    BB Mary Jo Luevano COTA/L Occupational Therapy Assistant          Mobility/ADL's     Row Name 01/01/21 0941          Bed Mobility    Bed Mobility  supine-sit;sit-supine  -BB     Rolling Left [place] (Bed Mobility)  modified independence  -BB     Scooting/Bridging [place] (Bed Mobility)  -- pt struggled with SBA to scoot up towards HOB took an extended amount of time and not able to move much due to pain, pt constantly adjusted bed position  -BB     Supine-Sit [place] (Bed Mobility)  standby assist  -BB     Bed Mobility, Safety Issues  decreased use of arms for  pushing/pulling;decreased use of legs for bridging/pushing;impaired trunk control for bed mobility  -BB     Assistive Device (Bed Mobility)  bed rails;head of bed elevated  -BB     Row Name 01/01/21 0941          Bathing Assessment/Intervention    [place] Level (Bathing)  bathing skills;lower body;maximum assist (25% patient effort);upper body;minimum assist (75% patient effort);set up  -BB     Position (Bathing)  supported sitting  -BB     Row Name 01/01/21 0941          Upper Body Dressing Assessment/Training    [place] Level (Upper Body Dressing)  doff;don;set up;minimum assist (75% patient effort)  -BB     Row Name 01/01/21 0941          Lower Body Dressing Assessment/Training    [place] Level (Lower Body Dressing)  doff;don;socks;maximum assist (25% patient effort)  -BB     Position (Lower Body Dressing)  supported sitting  -BB     Row Name 01/01/21 0941          Grooming Assessment/Training    [place] Level (Grooming)  grooming skills;hair care, combing/brushing;oral care regimen;wash face, hands;standby assist;set up  -BB     Position (Grooming)  supported sitting  -BB       User Key  (r) = Recorded By, (t) = Taken By, (c) = Cosigned By    Initials Name Provider Type    BB Mary Jo Luevano COTA/L Occupational Therapy Assistant        Obj/Interventions     Row Name 01/01/21 0941          Vision Assessment/Intervention    Visual Impairment/Limitations  corrective lenses full-time hearing WFL  -BB     Row Name 01/01/21 0941          Range of Motion Comprehensive    General Range of Motion  bilateral upper extremity ROM WFL  -BB     Row Name 01/01/21 0941          Elbow/Forearm (Therapeutic Exercise)    Elbow/Forearm Strengthening (Therapeutic Exercise)  bilateral;flexion;extension;supination;pronation;1 lb free weight 1x15  -BB     Row Name 01/01/21 0941          Wrist (Therapeutic Exercise)    Wrist (Therapeutic Exercise)  AROM (active range of motion)  -BB     Wrist AROM (Therapeutic  Exercise)  bilateral;flexion;extension 1x15 reps  -BB     Row Name 01/01/21 0941          Hand (Therapeutic Exercise)    Hand (Therapeutic Exercise)  AROM (active range of motion)  -BB     Hand AROM/AAROM (Therapeutic Exercise)  AROM (active range of motion);finger flexion;finger extension 1x15 reps  -BB     Row Name 01/01/21 0941          Therapeutic Exercise    Therapeutic Exercise  shoulder;elbow/forearm;wrist  -BB       User Key  (r) = Recorded By, (t) = Taken By, (c) = Cosigned By    Initials Name Provider Type    Mary Jo Vanessa COTA/L Occupational Therapy Assistant        Goals/Plan     Row Name 01/01/21 0941          Transfer Goal 1 (OT)    Activity/Assistive Device (Transfer Goal 1, OT)  toilet  -BB     [place] Level/Cues Needed (Transfer Goal 1, OT)  standby assist;verbal cues required;tactile cues required  -BB     Time Frame (Transfer Goal 1, OT)  long term goal (LTG);by discharge  -BB     Progress/Outcome (Transfer Goal 1, OT)  goal not met  -BB     Row Name 01/01/21 0941          Bathing Goal 1 (OT)    Activity/Device (Bathing Goal 1, OT)  bathing skills, all  -BB     [place] Level/Cues Needed (Bathing Goal 1, OT)  minimum assist (75% or more patient effort)  -BB     Time Frame (Bathing Goal 1, OT)  long term goal (LTG);by discharge  -BB     Progress/Outcomes (Bathing Goal 1, OT)  goal not met  -BB     Row Name 01/01/21 0941          Strength Goal 1 (OT)    Strength Goal 1 (OT)  Pt will engage in a 30-40 min functional task with fair safety and endurance with O2 90 or up.  -BB     Time Frame (Strength Goal 1, OT)  long term goal (LTG);by discharge  -BB     Progress/Outcome (Strength Goal 1, OT)  goal met  -BB       User Key  (r) = Recorded By, (t) = Taken By, (c) = Cosigned By    Initials Name Provider Type    Mary Jo Vanessa COTA/L Occupational Therapy Assistant        Clinical Impression     Row Name 01/01/21 0941          Pain Scale: Numbers Pre/Post-Treatment     Pretreatment Pain Rating  10/10  -BB     Posttreatment Pain Rating  10/10  -BB     Pain Location  back  -BB     Pain Intervention(s)  Medication (See MAR)  -BB     Row Name 01/01/21 0941          Plan of Care Review    Plan of Care Reviewed With  patient  -BB     Progress  no change  -BB     Outcome Summary  Pt agrees to OT this am. Pt performed B UE elbow, wrist, and finger flexion/extension 1 set x15 reps. Pt completed UB ADL with Min A and LB ADL with Max A. No new goals met this tx. Continue OT POC.  -BB     Row Name 01/01/21 0941          Therapy Assessment/Plan (OT)    Rehab Potential (OT)  fair, will monitor progress closely  -BB     Criteria for Skilled Therapeutic Interventions Met (OT)  yes;meets criteria;skilled treatment is necessary  -BB     Therapy Frequency (OT)  other (see comments) 5-7 days a week  -BB     Row Name 01/01/21 0941          Therapy Plan Review/Discharge Plan (OT)    Anticipated Discharge Disposition (OT)  home with 24/7 care;home with home health;skilled nursing facility  -BB     Row Name 01/01/21 0941          Vital Signs    Pretreatment Heart Rate (beats/min)  62  -BB     Posttreatment Heart Rate (beats/min)  66  -BB     Pre SpO2 (%)  90  -BB     O2 Delivery Pre Treatment  room air  -BB     Post SpO2 (%)  91  -BB     O2 Delivery Post Treatment  room air  -BB     Pre Patient Position  Supine  -BB     Post Patient Position  Supine  -BB     Row Name 01/01/21 0941          Positioning and Restraints    Pre-Treatment Position  in bed  -BB     Post Treatment Position  bed  -BB     In Bed  fowlers;call light within reach;encouraged to call for assist;exit alarm on  -BB       User Key  (r) = Recorded By, (t) = Taken By, (c) = Cosigned By    Initials Name Provider Type    BB Mary Jo Luevano COTA/L Occupational Therapy Assistant        Outcome Measures     Row Name 01/01/21 0941          How much help from another is currently needed...    Putting on and taking off regular lower body  clothing?  2  -BB     Bathing (including washing, rinsing, and drying)  2  -BB     Toileting (which includes using toilet bed pan or urinal)  2  -BB     Putting on and taking off regular upper body clothing  2  -BB     Taking care of personal grooming (such as brushing teeth)  3  -BB     Eating meals  4  -BB     AM-PAC 6 Clicks Score (OT)  15  -BB       User Key  (r) = Recorded By, (t) = Taken By, (c) = Cosigned By    Initials Name Provider Type    Mary Jo Vanessa COTA/L Occupational Therapy Assistant        Occupational Therapy Education                 Title: PT OT SLP Therapies (In Progress)     Topic: Occupational Therapy (Done)     Point: ADL training (Done)     Description:   Instruct learner(s) on proper safety adaptation and remediation techniques during self care or transfers.   Instruct in proper use of assistive devices.              Learning Progress Summary           Patient Acceptance, E,TB, VU by  at 12/29/2020 1319    Acceptance, E, NR by  at 12/28/2020 1434    Comment: Educated about OT and POC. Educated on safety throughout. Educated to call for assist.                   Point: Home exercise program (Done)     Description:   Instruct learner(s) on appropriate technique for monitoring, assisting and/or progressing therapeutic exercises/activities.              Learning Progress Summary           Patient Acceptance, E,TB, VU by EVE at 12/29/2020 1319                   Point: Precautions (Done)     Description:   Instruct learner(s) on prescribed precautions during self-care and functional transfers.              Learning Progress Summary           Patient Acceptance, E, VU by CHINA at 12/30/2020 1332    Comment: Pt educated on OT and POC.    Acceptance, E,TB, VU by  at 12/29/2020 1319    Acceptance, E, NR by  at 12/28/2020 1434    Comment: Educated about OT and POC. Educated on safety throughout. Educated to call for assist.                   Point: Body mechanics (Done)     Description:    Instruct learner(s) on proper positioning and spine alignment during self-care, functional mobility activities and/or exercises.              Learning Progress Summary           Patient Acceptance, E,TB, VU by LW at 12/29/2020 1319                               User Key     Initials Effective Dates Name Provider Type Discipline     06/08/18 -  Rossana Alexander OTR/L Occupational Therapist OT    LW 03/07/18 -  Magdalena House GUTIERREZ/L Occupational Therapy Assistant OT    [place] 11/05/19 -  Miranda Rowley OTA Occupational Therapy Assistant OT              OT Recommendation and Plan  Therapy Frequency (OT): other (see comments)(5-7 days a week)  Plan of Care Review  Plan of Care Reviewed With: patient  Progress: no change  Outcome Summary: Pt agrees to OT this am. Pt performed B UE elbow, wrist, and finger flexion/extension 1 set x15 reps. Pt completed UB ADL with Min A and LB ADL with Max A. No new goals met this tx. Continue OT POC.     Time Calculation:   Time Calculation- OT     Row Name 01/01/21 1313             Time Calculation- OT    OT Start Time  0941  -BB      OT Stop Time  1036  -BB      OT Time Calculation (min)  55 min  -BB      Total Timed Code Minutes- OT  55 minute(s)  -BB      OT Received On  01/01/21  -BB        User Key  (r) = Recorded By, (t) = Taken By, (c) = Cosigned By    Initials Name Provider Type    BB Mary Jo Luevano COTA/L Occupational Therapy Assistant        Therapy Charges for Today     Code Description Service Date Service Provider Modifiers Qty    62684081880 HC OT SELF CARE/MGMT/TRAIN EA 15 MIN 1/1/2021 Mary Jo Luevano COTA/L GO 2    41862502983 HC OT THER PROC EA 15 MIN 1/1/2021 Mary Jo Luevano COTA/L GO 2               CHERISE Farfan  1/1/2021

## 2021-01-01 NOTE — THERAPY TREATMENT NOTE
Acute Care - Physical Therapy Treatment Note  [facility]     Patient Name: Michelle Pierce  : 1945  MRN: [id number]  Today's Date: 2021           PT Assessment (last 12 hours)      PT Evaluation and Treatment     Row Name 21 1429          Physical Therapy Time and Intention    Subjective Information  complains of;pain  -EM     Document Type  therapy note (daily note)  -EM     Mode of Treatment  individual therapy;physical therapy  -EM     Patient Effort  adequate  -EM     Row Name 21 1429          Cognition    Affect/Mental Status (Cognitive)  WFL  -EM     Orientation Status (Cognition)  oriented x 3  -EM     Row Name 21 142          Pain Scale: Numbers Pre/Post-Treatment    Pretreatment Pain Rating  1/10  -EM     Posttreatment Pain Rating  /10  -EM     Pain Location - Orientation  lower  -EM     Pain Location  back  -EM     Row Name 21 142          Bed Mobility    Supine-Sit [place] (Bed Mobility)  standby assist  -EM     Sit-Supine [place] (Bed Mobility)  supervision  -EM     Assistive Device (Bed Mobility)  bed rails  -EM     Comment (Bed Mobility)  via logroll technique  -EM     Row Name 21 1429          Transfers    Sit-Stand [place] (Transfers)  standby assist max vc's for hand placement due to pt pulling from wx  -EM     Stand-Sit [place] (Transfers)  supervision vc's for hand placement  -EM     Row Name 21 142          Sit-Stand Transfer    Assistive Device (Sit-Stand Transfers)  walker, front-wheeled  -EM     Row Name 21 1429          Stand-Sit Transfer    Assistive Device (Stand-Sit Transfers)  walker, front-wheeled  -EM     Row Name 21 1429          Gait/Stairs (Locomotion)    [place] Level (Gait)  supervision  -EM     Assistive Device (Gait)  walker, front-wheeled  -EM     Distance in Feet (Gait)  24'  -EM     Pattern (Gait)  step-through  -EM     Deviations/Abnormal Patterns (Gait)  base of support,  wide;julio c decreased;festinating/shuffling;stride length decreased;gait speed decreased  -EM     Bilateral Gait Deviations  forward flexed posture  -EM     Row Name 01/01/21 1429          Hip (Therapeutic Exercise)    Hip (Therapeutic Exercise)  AROM (active range of motion)  -EM     Hip AROM (Therapeutic Exercise)  bilateral;flexion  -EM     Row Name 01/01/21 1429          Knee (Therapeutic Exercise)    Knee AROM (Therapeutic Exercise)  bilateral;LAQ (long arc quad);SLR (straight leg raise);2 sets;10 repetitions  -EM     Row Name 01/01/21 1429          Ankle (Therapeutic Exercise)    Ankle AROM (Therapeutic Exercise)  bilateral;dorsiflexion;plantarflexion;2 sets;10 repetitions  -EM     Row Name 01/01/21 1429          Vital Signs    Pre Systolic BP Rehab  163  -EM     Pre Treatment Diastolic BP  66  -EM     Pretreatment Heart Rate (beats/min)  71  -EM     Posttreatment Heart Rate (beats/min)  90  -EM     Pre SpO2 (%)  96  -EM     O2 Delivery Pre Treatment  room air  -EM     Post SpO2 (%)  98  -EM     O2 Delivery Post Treatment  room air  -EM     Pre Patient Position  Supine  -EM     Intra Patient Position  Standing  -EM     Post Patient Position  Supine  -EM     Row Name 01/01/21 1429          Bed Mobility Goal 1 (PT)    Activity/Assistive Device (Bed Mobility Goal 1, PT)  sit to supine;supine to sit  -EM     [place] Level/Cues Needed (Bed Mobility Goal 1, PT)  independent  -EM     Time Frame (Bed Mobility Goal 1, PT)  5 days  -EM     Progress/Outcomes (Bed Mobility Goal 1, PT)  goal not met  -EM     Row Name 01/01/21 1429          Transfer Goal 1 (PT)    Activity/Assistive Device (Transfer Goal 1, PT)  sit-to-stand/stand-to-sit;bed-to-chair/chair-to-bed  -EM     [place] Level/Cues Needed (Transfer Goal 1, PT)  modified independence  -EM     Time Frame (Transfer Goal 1, PT)  by discharge  -EM     Progress/Outcome (Transfer Goal 1, PT)  goal not met  -EM     Row Name 01/01/21 1429          Gait Training  Goal 1 (PT)    Activity/Assistive Device (Gait Training Goal 1, PT)  gait (walking locomotion);assistive device use;decrease fall risk;increase endurance/gait distance  -EM     [place] Level (Gait Training Goal 1, PT)  standby assist  -EM     Distance (Gait Training Goal 1, PT)  50ft or morex2  -EM     Time Frame (Gait Training Goal 1, PT)  by discharge  -EM     Strategies/Barriers (Gait Training Goal 1, PT)  co-morbidities  -EM     Progress/Outcome (Gait Training Goal 1, PT)  goal not met  -EM     Row Name 01/01/21 1429          ROM Goal 1 (PT)    ROM Goal 1 (PT)  Pt will tolerate LE exercises OOB in chair with VSS  -EM     Time Frame (ROM Goal 1, PT)  by discharge  -EM     Progress/Outcome (ROM Goal 1, PT)  (S) goal met  -EM     Row Name 01/01/21 1429          Positioning and Restraints    Pre-Treatment Position  in bed  -EM     Post Treatment Position  bed  -EM     In Bed  supine;call light within reach;encouraged to call for assist;exit alarm on  -EM       User Key  (r) = Recorded By, (t) = Taken By, (c) = Cosigned By    Initials Name Provider Type    EM Doug Scott, PTA Physical Therapy Assistant        Physical Therapy Education                 Title: PT OT SLP Therapies (In Progress)     Topic: Physical Therapy (In Progress)     Point: Mobility training (In Progress)     Learning Progress Summary           Patient Acceptance, E, NR by  at 12/28/2020 1629                   Point: Home exercise program (Not Started)     Learner Progress:  Not documented in this visit.          Point: Body mechanics (In Progress)     Learning Progress Summary           Patient Acceptance, E, NR by MASSIMO at 12/28/2020 1629                   Point: Precautions (In Progress)     Learning Progress Summary           Patient Acceptance, E, NR by  at 12/28/2020 1629                               User Key     Initials Effective Dates Name Provider Type Discipline    MASSIMO 04/03/18 -  Fiorella Francis PT Physical Therapist PT               PT Recommendation and Plan  Anticipated Discharge Disposition (PT): skilled nursing facility, home with 24/7 care, home with home health  Plan of Care Reviewed With: patient  Progress: improving  Outcome Summary: Pt [place] tx well, she is very talkative and req freq vc's to stay on task. Pt t/f sup-sit-sup with SBAx1 via logroll technique with good form. Pt t/f sit-stnad-sit wiht SBAx1 but max vc;s required for hand placement due to pt pulling from wx. Pt amb 24' with FWRW with SBAx1. Pt performed B LE therex 2x10 each. Pt LBP hendering progress. PLan: Increase amb with swivel wheel RW.  Outcome Measures     Row Name 01/01/21 1500 12/30/20 1218          How much help from another person do you currently need...    Turning from your back to your side while in flat bed without using bedrails?  2  -EM  --     Moving from lying on back to sitting on the side of a flat bed without bedrails?  2  -EM  --     Moving to and from a bed to a chair (including a wheelchair)?  3  -EM  --     Standing up from a chair using your arms (e.g., wheelchair, bedside chair)?  3  -EM  --     Climbing 3-5 steps with a railing?  2  -EM  --     To walk in hospital room?  3  -EM  --     AM-PAC 6 Clicks Score (PT)  15  -EM  --        How much help from another is currently needed...    Putting on and taking off regular lower body clothing?  --  2  -[place]     Bathing (including washing, rinsing, and drying)  --  2  -[place]     Toileting (which includes using toilet bed pan or urinal)  --  2  -[place]     Putting on and taking off regular upper body clothing  --  2  -[place]     Taking care of personal grooming (such as brushing teeth)  --  3  -[place]     Eating meals  --  4  -[place]     AM-PAC 6 Clicks Score (OT)  --  15  -[place]        Functional Assessment    Outcome Measure Options  AM-PAC 6 Clicks Daily Activity (OT)  -EM  AM-PAC 6 Clicks Daily Activity (OT)  -[place]       User Key  (r) = Recorded By, (t) = Taken By, (c) = Cosigned By    Initials Name Provider Type     Doug Hayden PTA Physical Therapy Assistant    Miranda Orlando OTA Occupational Therapy Assistant           Time Calculation:   PT Charges     Row Name 01/01/21 1554             Time Calculation    Start Time  1429  -EM      Stop Time  1510  -EM      Time Calculation (min)  41 min  -EM         Time Calculation- PT    Total Timed Code Minutes- PT  41 minute(s)  -EM        User Key  (r) = Recorded By, (t) = Taken By, (c) = Cosigned By    Initials Name Provider Type    Doug Hayden PTA Physical Therapy Assistant        Therapy Charges for Today     Code Description Service Date Service Provider Modifiers Qty    99302465496 HC PT THER PROC EA 15 MIN 1/1/2021 Doug Scott PTA GP 1    18446855424 HC PT THERAPEUTIC ACT EA 15 MIN 1/1/2021 Doug Scott PTA GP 1    96519805095 HC GAIT TRAINING EA 15 MIN 1/1/2021 Doug Scott PTA GP 1          PT G-Codes  Outcome Measure Options: AM-PAC 6 Clicks Daily Activity (OT)  AM-PAC 6 Clicks Score (PT): 15  AM-PAC 6 Clicks Score (OT): 15    Doug Scott PTA  1/1/2021

## 2021-01-01 NOTE — PLAN OF CARE
Problem: Adult Inpatient Plan of Care  Goal: Plan of Care Review  Flowsheets  Taken 1/1/2021 1312  Progress: no change  Plan of Care Reviewed With: patient  Taken 1/1/2021 0941  Progress: no change  Plan of Care Reviewed With: patient  Outcome Summary: Pt agrees to OT this am. Pt performed B UE elbow, wrist, and finger flexion/extension 1 set x15 reps. Pt completed UB ADL with Min A and LB ADL with Max A. No new goals met this tx. Continue OT POC.   Goal Outcome Evaluation:  Plan of Care Reviewed With: patient  Progress: no change  Outcome Summary: Pt agrees to OT this am. Pt performed B UE elbow, wrist, and finger flexion/extension 1 set x15 reps. Pt completed UB ADL with Min A and LB ADL with Max A. No new goals met this tx. Continue OT POC.

## 2021-01-01 NOTE — PLAN OF CARE
Goal Outcome Evaluation:  Plan of Care Reviewed With: patient  Progress: improving  Outcome Summary: Pt [place] tx well, she is very talkative and req freq vc's to stay on task. Pt t/f sup-sit-sup with SBAx1 via logroll technique with good form. Pt t/f sit-stnad-sit wiht SBAx1 but max vc;s required for hand placement due to pt pulling from wx. Pt amb 24' with FWRW with SBAx1. Pt performed B LE therex 2x10 each. Pt LBP hendering progress. PLan: Increase amb with swivel wheel RW.

## 2021-01-02 NOTE — PROGRESS NOTES
Lilia Sweet DO,[facility]  Gastroenterology  Hepatology  Endoscopy  Board Certified in Internal Medicine and gastroenterology  [address], [address]  [place]. [address]  T- [phone]   F - [phone]     GASTROENTEROLOGY PROGRESS NOTE   LILIA SWEET DO.         SUBJECTIVE:   1/2/2021  Chief Complaint:     Subjective  : Asleep in her chair.  No complaints overnight.       CURRENT MEDICATIONS/OBJECTIVE/VS/PE:     Current Medications:     Current Facility-Administered Medications   Medication Dose Route Frequency Provider Last Rate Last Admin   • albuterol (PROVENTIL) nebulizer solution 0.083% 2.5 mg/3mL  2.5 mg Nebulization Q6H PRN Lilia Sweet DO       • ALPRAZolam (XANAX) tablet 0.25 mg  0.25 mg Oral Nightly PRN Lilia Sweet DO   0.25 mg at 12/30/20 2147   • apixaban (ELIQUIS) tablet 5 mg  5 mg Oral Q12H Maryan Tee APRN   5 mg at 01/02/21 0848   • atorvastatin (LIPITOR) tablet 10 mg  10 mg Oral Daily Lilia Sweet DO   10 mg at 01/02/21 0848   • betamethasone dipropionate 0.05 % cream   Topical Q12H Lilia Sweet DO   Given at 01/01/21 2108   • budesonide-formoterol (SYMBICORT) 160-4.5 MCG/ACT inhaler 2 puff  2 puff Inhalation BID - RT Lilia Sweet DO   2 puff at 01/02/21 0900   • cefTRIAXone (ROCEPHIN) 2 g/100 mL 0.9% NS IVPB (MBP)  2 g Intravenous Q24H Lilia Sweet DO   Stopped at 01/02/21 0041   • cholecalciferol (VITAMIN D3) tablet 1,000 Units  1,000 Units Oral Daily Lilia Sweet DO   1,000 Units at 01/02/21 0848   • dextrose (D50W) 25 g/ 50mL Intravenous Solution 25 g  25 g Intravenous Q15 Min PRN Lilia Sweet DO       • dextrose (GLUTOSE) oral gel 15 g  15 g Oral Q15 Min PRN Lilia Sweet DO       • ferrous sulfate EC tablet 324 mg  324 mg Oral BID With Meals Lilia Sweet DO   324 mg at 01/02/21 0848   • glucagon (human recombinant) (GLUCAGEN DIAGNOSTIC) injection 1 mg  1 mg Subcutaneous Q15 Min PRN Lilia Sweet, DO        • HYDROcodone-acetaminophen (NORCO)  MG per tablet 1 tablet  1 tablet Oral Q4H PRN Brendon Gramajo DO   1 tablet at 01/02/21 0849   • HYDROmorphone (DILAUDID) injection 0.5 mg  0.5 mg Intravenous Q2H PRN Brendon Gramajo DO   0.5 mg at 01/01/21 1211   • insulin aspart (novoLOG) injection 0-9 Units  0-9 Units Subcutaneous TID AC Brendon Gramajo DO   6 Units at 01/02/21 1110   • insulin detemir (LEVEMIR) injection 30 Units  30 Units Subcutaneous Nightly Brendon Gramajo DO   30 Units at 01/01/21 2103   • lisinopril (PRINIVIL,ZESTRIL) tablet 2.5 mg  2.5 mg Oral Daily Brendon Gramajo DO   2.5 mg at 01/02/21 0848   • methocarbamol (ROBAXIN) tablet 750 mg  750 mg Oral Q6H PRN Brendon Gramajo DO   750 mg at 01/02/21 1005   • metoprolol tartrate (LOPRESSOR) tablet 25 mg  25 mg Oral BID Brendon Gramajo DO   25 mg at 01/02/21 0848   • ondansetron (ZOFRAN) injection 4 mg  4 mg Intravenous Q6H PRN Brendon Gramajo DO   4 mg at 12/30/20 1838   • oxybutynin XL (DITROPAN-XL) 24 hr tablet 5 mg  5 mg Oral Daily Brendon Gramajo DO   5 mg at 01/02/21 0848   • pantoprazole (PROTONIX) EC tablet 40 mg  40 mg Oral QAM Brendon Gramajo DO   40 mg at 01/02/21 0604   • sodium chloride 0.9 % flush 10 mL  10 mL Intravenous Q12H Brendon Gramajo DO   10 mL at 01/02/21 0849   • sodium chloride 0.9 % flush 10 mL  10 mL Intravenous PRN Brendon Gramajo DO       • traZODone (DESYREL) tablet 50 mg  50 mg Oral Nightly Brendon Gramajo DO   50 mg at 01/01/21 2103   • venlafaxine XR (EFFEXOR-XR) 24 hr capsule 75 mg  75 mg Oral Daily Brendon Gramjao DO   75 mg at 01/02/21 0848       Objective     Physical Exam:   Temp:  [96.3 °F (35.7 °C)-98 °F (36.7 °C)] 96.3 °F (35.7 °C)  Heart Rate:  [60-90] 68  Resp:  [16-18] 18  BP: (105-163)/(58-74) 118/74     Physical Exam:  General Appearance:    Alert, cooperative, in no acute distress   Head:    Normocephalic, without obvious abnormality, atraumatic   Eyes:             Lids and lashes normal, conjunctivae and sclerae normal, no   icterus, no pallor, corneas clear, PERRLA   Ears:    Ears appear intact with no abnormalities noted   Throat:   No oral lesions, no thrush, oral mucosa moist   Neck:   No adenopathy, supple, trachea midline, no thyromegaly, no     carotid bruit, no JVD   Back:     No kyphosis present, no scoliosis present, no skin lesions,       erythema or scars, no tenderness to percussion or                   palpation,   range of motion normal   Lungs:     Clear to auscultation,respirations regular, even and                   unlabored    Heart:    Regular rhythm and normal rate, normal S1 and S2, no            murmur, no gallop, no rub, no click   Breast Exam:    Deferred   Abdomen:     Normal bowel sounds, no masses, no organomegaly, soft        non-tender, non-distended, no guarding, no rebound                 tenderness   Genitalia:    Deferred   Extremities:   Moves all extremities well, no edema, no cyanosis, no              redness   Pulses:   Pulses palpable and equal bilaterally   Skin:   No bleeding, bruising or rash   Lymph nodes:   No palpable adenopathy   Neurologic:   Cranial nerves 2 - 12 grossly intact, sensation intact, DTR        present and equal bilaterally      Results Review:     Lab Results (last 24 hours)     Procedure Component Value Units Date/Time    POC Glucose Once [[id number]]  (Abnormal) Collected: 01/02/21 1034    Specimen: Blood Updated: 01/02/21 1110     Glucose 297 mg/dL      Comment: RN NotifiedOperator: [id number] ENNIS Carolin ID: [id number]       Blood Culture - Blood, Arm, Right [[id number]] Collected: 12/28/20 1024    Specimen: Blood from Arm, Right Updated: 01/02/21 1045     Blood Culture No growth at 5 days    Blood Culture - Blood, Hand, Right [[id number]] Collected: 12/28/20 1041    Specimen: Blood from Hand, Right Updated: 01/02/21 1045     Blood Culture No growth at 5 days    Basic Metabolic Panel [[id number]]   (Abnormal) Collected: 01/02/21 0709    Specimen: Blood Updated: 01/02/21 0748     Glucose 154 mg/dL      BUN 12 mg/dL      Creatinine 0.69 mg/dL      Sodium 134 mmol/L      Potassium 4.5 mmol/L      Chloride 101 mmol/L      CO2 26.0 mmol/L      Calcium 10.3 mg/dL      eGFR Non African Amer 83 mL/min/1.73      BUN/Creatinine Ratio 17.4     Anion Gap 7.0 mmol/L     Narrative:      GFR Normal >60  Chronic Kidney Disease <60  Kidney Failure <15      CBC & Differential [[id number]]  (Abnormal) Collected: 01/02/21 0709    Specimen: Blood Updated: 01/02/21 0726    Narrative:      The following orders were created for panel order CBC & Differential.  Procedure                               Abnormality         Status                     ---------                               -----------         ------                     CBC Auto Differential[[id number]]        Abnormal            Final result                 Please view results for these tests on the individual orders.    CBC Auto Differential [[id number]]  (Abnormal) Collected: 01/02/21 0709    Specimen: Blood Updated: 01/02/21 0726     WBC 7.76 10*3/mm3      RBC 4.26 10*6/mm3      Hemoglobin 11.6 g/dL      Hematocrit 36.6 %      MCV 85.9 fL      MCH 27.2 pg      MCHC 31.7 g/dL      RDW 13.7 %      RDW-SD 42.5 fl      MPV 8.5 fL      Platelets 359 10*3/mm3      Neutrophil % 57.0 %      Lymphocyte % 26.5 %      Monocyte % 8.2 %      Eosinophil % 7.2 %      Basophil % 0.8 %      Immature Grans % 0.3 %      Neutrophils, Absolute 4.42 10*3/mm3      Lymphocytes, Absolute 2.06 10*3/mm3      Monocytes, Absolute 0.64 10*3/mm3      Eosinophils, Absolute 0.56 10*3/mm3      Basophils, Absolute 0.06 10*3/mm3      Immature Grans, Absolute 0.02 10*3/mm3      nRBC 0.0 /100 WBC     POC Glucose Once [[id number]]  (Abnormal) Collected: 01/02/21 0612    Specimen: Blood Updated: 01/02/21 0646     Glucose 150 mg/dL      Comment: RN NotifiedOperator: [id number] OANH REBECCAMeter ID: [id number]          POC Glucose Once [[id number]]  (Abnormal) Collected: 01/01/21 1930    Specimen: Blood Updated: 01/01/21 2040     Glucose 208 mg/dL      Comment: RN NotifiedOperator: [id number] OANH PEREIRACAMeter ID: [id number]       POC Glucose Once [[id number]]  (Abnormal) Collected: 01/01/21 1704    Specimen: Blood Updated: 01/01/21 1717     Glucose 165 mg/dL      Comment: RN NotifiedOperator: [id number] BARON MANUELMeter ID: [id number]              I reviewed the patient's new clinical results.  I reviewed the patient's new imaging results and agree with the interpretation.     ASSESSMENT/PLAN:   ASSESSMENT:  1.  Anemia secondary to chronic blood loss, transfusion was performed  2.  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  Gallstones without cholecystitis    PLAN:  1.  Esophagogastroduodenoscopy and colonoscopy next week.  Before I prepped her I want Dr. Henderson to be aware of her so that he understands before he does a sedated procedure on her.     Brendon Gramajo DO  01/02/21  11:31 CST

## 2021-01-02 NOTE — PROGRESS NOTES
[facility] Medicine Services  INPATIENT PROGRESS NOTE    Length of Stay: 5  Date of Admission: 12/26/2020  Primary Care Physician: Avelino Fitzgerald APRN    Subjective   Chief Complaint: No complaints    HPI:    1/2/21: Patient has no complaints today.  She states she is working well with therapy.    1/1/21:  Patient complained of back pain from being in the bed.  Encouraged to work with physical therapy today.      75 year old female with a history of COPD, cirrhosis, DM, HTN, and HLD who presents to the ED with fever and left flank pain.  CT of the abdomen revealed no evidence of uropathy.  Mediastinal lymphadenopathy was noted.  Chest x-ray revealed interstitial edema and/or infiltrates.  She was admitted for likely CAP and initiated on IV antiboitics.  Blood cultures grew Streptococcus infantarius.  Echocardiogram revealed small, mobile mass on the non-coronary cusp of the aortic valve measuring 8 mm.  Cardiology and CT surgery consulted and recommend 6 weeks of antibiotics.  GI was consulted and recommends EGD and colonoscopy.      12/31/20: Patient underwent EGD and colonoscopy today with no suspicious mass found.  6 polyps were removed and are pending biopsy.     Review of Systems   Constitutional: Negative for activity change and fatigue.   HENT: Negative for ear pain and sore throat.    Eyes: Negative for pain and discharge.   Respiratory: Negative for cough and shortness of breath.    Cardiovascular: Negative for chest pain and palpitations.   Gastrointestinal: Negative for abdominal pain and nausea.   Endocrine: Negative for cold intolerance and heat intolerance (  ).   Genitourinary: Negative for difficulty urinating and dysuria.   Musculoskeletal: Negative for arthralgias and gait problem.   Skin: Negative for color change and rash.   Neurological: Negative for dizziness and weakness.   Psychiatric/Behavioral: Negative for agitation and confusion.         Objective    Temp:  [96.3 °F (35.7 °C)-98.2 °F (36.8 °C)] 96.3 °F (35.7 °C)  Heart Rate:  [60-90] 68  Resp:  [16-20] 18  BP: (105-163)/(58-74) 118/74    Physical Exam  Constitutional:       Appearance: She is well-developed.   HENT:      Head: Normocephalic and atraumatic.   Eyes:      Pupils: Pupils are equal, round, and reactive to light.   Neck:      Musculoskeletal: Normal range of motion and neck supple.   Cardiovascular:      Rate and Rhythm: Normal rate and regular rhythm.   Pulmonary:      Effort: Pulmonary effort is normal.      Breath sounds: Normal breath sounds.   Abdominal:      General: Bowel sounds are normal.      Palpations: Abdomen is soft.   Musculoskeletal: Normal range of motion.   Skin:     General: Skin is warm and dry.   Neurological:      Mental Status: She is alert and oriented to person, place, and time.   Psychiatric:         Behavior: Behavior normal.       Results Review:  I have reviewed the labs, radiology results, and diagnostic studies.    Laboratory Data:   Results from last 7 days   Lab Units 01/02/21  0709 01/01/21  0645 12/31/20  0555  12/26/20  1818   SODIUM mmol/L 134* 135* 135*   < > 130*   POTASSIUM mmol/L 4.5 4.7 4.3   < > 4.1   CHLORIDE mmol/L 101 103 100   < > 97*   CO2 mmol/L 26.0 25.0 26.0   < > 23.0   BUN mg/dL 12 10 11   < > 14   CREATININE mg/dL 0.69 0.63 0.67   < > 0.82   GLUCOSE mg/dL 154* 154* 173*   < > 259*   CALCIUM mg/dL 10.3 9.8 9.9   < > 9.7   BILIRUBIN mg/dL  --   --   --   --  0.2   ALK PHOS U/L  --   --   --   --  85   ALT (SGPT) U/L  --   --   --   --  10   AST (SGOT) U/L  --   --   --   --  12   ANION GAP mmol/L 7.0 7.0 9.0   < > 10.0    < > = values in this interval not displayed.     Estimated Creatinine Clearance: 70.9 mL/min (by C-G formula based on SCr of 0.69 mg/dL).          Results from last 7 days   Lab Units 01/02/21  0709 01/01/21  0645 12/31/20  0555 12/30/20  0555 12/29/20  0541   WBC 10*3/mm3 7.76 8.16 7.18 7.38 8.42   HEMOGLOBIN g/dL  11.6* 11.7* 11.3* 10.9* 10.6*   HEMATOCRIT % 36.6 34.9 33.9* 33.9* 31.8*   PLATELETS 10*3/mm3 359 349 306 309 251           Culture Data:   No results found for: BLOODCX  No results found for: URINECX  No results found for: RESPCX  No results found for: WOUNDCX  No results found for: STOOLCX  No components found for: BODYFLD    Radiology Data:   Imaging Results (Last 24 Hours)     ** No results found for the last 24 hours. **          I have reviewed the patient's current medications.     Assessment/Plan     Active Hospital Problems    Diagnosis   • **Endocarditis of aortic valve   • CAP (community acquired pneumonia)   • Streptococcal bacteremia   • Lymphadenopathy, mediastinal   • Fever   • Atrial flutter, paroxysmal (CMS/HCC)   • Gastroesophageal reflux disease without esophagitis   • Essential hypertension   • Diabetes mellitus, type II, insulin dependent (CMS/[facility])       Plan:    1.  Endocarditis of the aortic valve: Rocephin to 2 grams daily, will need 6 week course.  Case management working on getting home antibiotics set up.  Cardiology consult appreciated.  CT surgery consultation appreciated  2.  Bacteremia, streptococcus infantarius: GI consultation appreciated.  No obvious mass found on colonoscopy.  6 polyps have been removed and are pending biopsy.  3.  Hypertension: Continue lisinopril and lopressor.  4.  Paroxysmal atrial flutter: Continue Lopressor and Eliquis.    5.  Diabetes mellitus, type II: Continue Levemir and SSI.  6.  Deconditioning: Continue PT/OT.    Discharge planning: Cardiology 6 week follow-up with echocardiogram, CT surgery 6 week follow-up to assess mediastinal lymphadenopathy and consideration of biopsy.      Discharge Planning: I expect patient to be discharged to home in 1-2 days.      This document has been electronically signed by MAMTA Hamlin on January 2, 2021 10:56 CST

## 2021-01-02 NOTE — THERAPY TREATMENT NOTE
"Acute Care - Physical Therapy Treatment Note  [facility]     Patient Name: Michelle Pierce  : 1945  MRN: [id number]  Today's Date: 2021           PT Assessment (last 12 hours)      PT Evaluation and Treatment     Row Name 21 1114          Physical Therapy Time and Intention    Subjective Information  complains of;fatigue declines gt due to fatigue  -LN     Document Type  therapy note (daily note)  -LN     Mode of Treatment  individual therapy;physical therapy  -LN     Patient Effort  adequate  -LN     Comment  to stand pt braces back legs of walker with feet and pushes off the bed with 1 hand;stands 1/2 then slowly finishes standing up-all done with sba of 1 \"I do it different but I don't want to be rushed\"  -LN     Row Name 21 1114          General Information    Existing Precautions/Restrictions  fall;oxygen therapy device and L/min  -LN     Row Name 21 1114          Cognition    Affect/Mental Status (Cognitive)  WFL  -LN     Orientation Status (Cognition)  oriented x 3  -LN     Row Name 21 1114          Pain Scale: Numbers Pre/Post-Treatment    Pretreatment Pain Rating  0/10 - no pain  -LN     Posttreatment Pain Rating  0/10 - no pain  -LN     Row Name 21 1114          Bed Mobility    Supine-Sit [place] (Bed Mobility)  standby assist  -LN     Sit-Supine [place] (Bed Mobility)  not tested  -LN     Assistive Device (Bed Mobility)  bed rails  -LN     Row Name 21 1114          Transfers    Bed-Chair [place] (Transfers)  standby assist  -LN     Assistive Device (Bed-Chair Transfers)  walker, front-wheeled  -LN     Sit-Stand [place] (Transfers)  standby assist  -LN     Stand-Sit [place] (Transfers)  supervision vc's for hand placement  -LN     Row Name 21 1114          Sit-Stand Transfer    Assistive Device (Sit-Stand Transfers)  walker, front-wheeled  -LN     Row Name 21 1114          Stand-Sit Transfer    Assistive Device " (Stand-Sit Transfers)  walker, front-wheeled  -LN     Row Name 01/02/21 1114          Gait/Stairs (Locomotion)    [place] Level (Gait)  supervision  -LN     Assistive Device (Gait)  walker, front-wheeled  -LN     Distance in Feet (Gait)  bed to recliner  -LN     Pattern (Gait)  step-through  -LN     Deviations/Abnormal Patterns (Gait)  base of support, wide;julio c decreased;festinating/shuffling;stride length decreased;gait speed decreased  -LN     Bilateral Gait Deviations  forward flexed posture  -LN     Row Name 01/02/21 1114          Hip (Therapeutic Exercise)    Hip (Therapeutic Exercise)  AROM (active range of motion)  -LN     Hip AROM (Therapeutic Exercise)  bilateral;flexion  -LN     Row Name 01/02/21 1114          Knee (Therapeutic Exercise)    Knee (Therapeutic Exercise)  AROM (active range of motion)  -LN     Knee AROM (Therapeutic Exercise)  bilateral;LAQ (long arc quad)  -LN     Row Name 01/02/21 1114          Ankle (Therapeutic Exercise)    Ankle (Therapeutic Exercise)  AAROM (active assistive range of motion)  -LN     Ankle AROM (Therapeutic Exercise)  bilateral;dorsiflexion;plantarflexion  -LN     Row Name 01/02/21 1114          Plan of Care Review    Plan of Care Reviewed With  patient  -LN     Outcome Summary  sup-sit-stand-sit sba and v.c's for hand placement,bed to recliner with rw and sba of 1,2/10 reps seated ex  -LN     Row Name 01/02/21 1114          Vital Signs    Pre Systolic BP Rehab  122  -LN     Pre Treatment Diastolic BP  60  -LN     Post Systolic BP Rehab  128  -LN     Post Treatment Diastolic BP  72  -LN     Pretreatment Heart Rate (beats/min)  61  -LN     Intratreatment Heart Rate (beats/min)  69  -LN     Posttreatment Heart Rate (beats/min)  66  -LN     Pre SpO2 (%)  93  -LN     O2 Delivery Pre Treatment  supplemental O2  -LN     Intra SpO2 (%)  91  -LN     Post SpO2 (%)  95  -LN     Pre Patient Position  Side Lying  -LN     Intra Patient Position  Standing  -LN     Post  Patient Position  Sitting  -LN     Row Name 01/02/21 1114          Bed Mobility Goal 1 (PT)    Activity/Assistive Device (Bed Mobility Goal 1, PT)  sit to supine;supine to sit  -LN     [place] Level/Cues Needed (Bed Mobility Goal 1, PT)  independent  -LN     Time Frame (Bed Mobility Goal 1, PT)  5 days  -LN     Progress/Outcomes (Bed Mobility Goal 1, PT)  goal not met  -LN     Row Name 01/02/21 1114          Transfer Goal 1 (PT)    Activity/Assistive Device (Transfer Goal 1, PT)  sit-to-stand/stand-to-sit;bed-to-chair/chair-to-bed  -LN     [place] Level/Cues Needed (Transfer Goal 1, PT)  modified independence  -LN     Time Frame (Transfer Goal 1, PT)  by discharge  -LN     Progress/Outcome (Transfer Goal 1, PT)  goal not met  -LN     Row Name 01/02/21 1114          Gait Training Goal 1 (PT)    Activity/Assistive Device (Gait Training Goal 1, PT)  gait (walking locomotion);assistive device use;decrease fall risk;increase endurance/gait distance  -LN     [place] Level (Gait Training Goal 1, PT)  standby assist  -LN     Distance (Gait Training Goal 1, PT)  50ft or morex2  -LN     Time Frame (Gait Training Goal 1, PT)  by discharge  -LN     Strategies/Barriers (Gait Training Goal 1, PT)  co-morbidities  -LN     Progress/Outcome (Gait Training Goal 1, PT)  goal not met  -LN     Row Name 01/02/21 1114          ROM Goal 1 (PT)    ROM Goal 1 (PT)  Pt will tolerate LE exercises OOB in chair with VSS  -LN     Time Frame (ROM Goal 1, PT)  by discharge  -LN     Progress/Outcome (ROM Goal 1, PT)  goal met  -LN     Row Name 01/02/21 1114          Positioning and Restraints    Post Treatment Position  chair  -LN     In Chair  notified nsg;sitting;call light within reach;encouraged to call for assist  -LN       User Key  (r) = Recorded By, (t) = Taken By, (c) = Cosigned By    Initials Name Provider Type    Mirna Lee PTA Physical Therapy Assistant        Physical Therapy Education                 Title: PT OT  SLP Therapies (Done)     Topic: Physical Therapy (Done)     Point: Mobility training (Done)     Learning Progress Summary           Patient Acceptance, E,TB, VU,NR by LN at 1/2/2021 1309    Acceptance, E, NR by MASSIMO at 12/28/2020 1629                   Point: Home exercise program (Done)     Learning Progress Summary           Patient Acceptance, E,TB, VU,NR by LN at 1/2/2021 1309                   Point: Body mechanics (Done)     Learning Progress Summary           Patient Acceptance, E,TB, VU,NR by LN at 1/2/2021 1309    Acceptance, E, NR by MASSIMO at 12/28/2020 1629                   Point: Precautions (Done)     Learning Progress Summary           Patient Acceptance, E,TB, VU,NR by LN at 1/2/2021 1309    Acceptance, E, NR by MASSIMO at 12/28/2020 1629                               User Key     Initials Effective Dates Name Provider Type Discipline     04/03/18 -  Fiorella Francis, PT Physical Therapist PT    FELY 03/07/18 -  Mirna Kinney, PTA Physical Therapy Assistant PT              PT Recommendation and Plan  Anticipated Discharge Disposition (PT): skilled nursing facility, home with 24/7 care, home with home health  Plan of Care Reviewed With: patient  Outcome Summary: sup-sit-stand-sit sba and v.c's for hand placement,bed to recliner with rw and sba of 1,2/10 reps seated ex  Outcome Measures     Row Name 01/01/21 1500             How much help from another person do you currently need...    Turning from your back to your side while in flat bed without using bedrails?  2  -EM      Moving from lying on back to sitting on the side of a flat bed without bedrails?  2  -EM      Moving to and from a bed to a chair (including a wheelchair)?  3  -EM      Standing up from a chair using your arms (e.g., wheelchair, bedside chair)?  3  -EM      Climbing 3-5 steps with a railing?  2  -EM      To walk in hospital room?  3  -EM      AM-PAC 6 Clicks Score (PT)  15  -EM         Functional Assessment    Outcome Measure Options  AM-PAC 6  Clicks Daily Activity (OT)  -EM        User Key  (r) = Recorded By, (t) = Taken By, (c) = Cosigned By    Initials Name Provider Type    EM Doug Scott, PTA Physical Therapy Assistant           Time Calculation:   PT Charges     Row Name 01/02/21 1310             Time Calculation    Start Time  1114  -LN      Stop Time  1145  -LN      Time Calculation (min)  31 min  -LN      PT Received On  01/02/21  -LN         Time Calculation- PT    Total Timed Code Minutes- PT  31 minute(s)  -LN        User Key  (r) = Recorded By, (t) = Taken By, (c) = Cosigned By    Initials Name Provider Type    LN NirmalMirna del rosario S, PTA Physical Therapy Assistant        Therapy Charges for Today     Code Description Service Date Service Provider Modifiers Qty    40094735732 HC PT THERAPEUTIC ACT EA 15 MIN 1/2/2021 Nirmal, Mirna S, PTA GP 1    51550160922 HC PT THER PROC EA 15 MIN 1/2/2021 Nirmal, Mirna S, PTA GP 1          PT G-Codes  Outcome Measure Options: AM-PAC 6 Clicks Daily Activity (OT)  AM-PAC 6 Clicks Score (PT): 15  AM-PAC 6 Clicks Score (OT): 15    Mirna S Nirmal PTA  1/2/2021

## 2021-01-02 NOTE — PLAN OF CARE
"  Problem: Adult Inpatient Plan of Care  Goal: Plan of Care Review  Outcome: Ongoing, Progressing  Flowsheets  Taken 1/2/2021 0515 by Carol Bowman, RN  Progress: no change  Outcome Summary: VSS, patient complained to be in pain from \"working with PT\" able to rest well after PO pain meds, standby assist to bedside commode. waiting on PICC line placement in order to DC home  Taken 1/1/2021 1551 by Doug Scott PTA  Plan of Care Reviewed With: patient   Goal Outcome Evaluation:  Plan of Care Reviewed With: patient  Progress: no change  Outcome Summary: VSS, patient complained to be in pain from \"working with PT\" able to rest well after PO pain meds, standby assist to bedside commode. waiting on PICC line placement in order to DC home  "

## 2021-01-02 NOTE — PLAN OF CARE
Problem: Adult Inpatient Plan of Care  Goal: Plan of Care Review  Flowsheets  Taken 1/2/2021 1453  Progress: improving  Outcome Summary: Pt c/o pain and fatigue. Pt gave good effort with UE ther ex. PRN rest breaks needed. Pt was educated on home safety and HEP. Continue OT POC.  Taken 1/2/2021 1336  Progress: improving  Plan of Care Reviewed With: patient   Goal Outcome Evaluation:  Plan of Care Reviewed With: patient  Progress: improving  Outcome Summary: Pt c/o pain and fatigue. Pt gave good effort with UE ther ex. PRN rest breaks needed. Pt was educated on home safety and HEP. Continue OT POC.

## 2021-01-02 NOTE — THERAPY TREATMENT NOTE
Patient Name: Michelle Pierce  : 1945    MRN: [id number]                              Today's Date: 2021       Admit Date: 2020    Visit Dx:     ICD-10-CM ICD-9-CM   1. Fever, unspecified fever cause  R50.9 780.60   2. Sepsis, due to unspecified organism, unspecified whether acute organ dysfunction present (CMS/[facility])  A41.9 038.9     995.91   3. Lung infiltrate  R91.8 793.19   4. Mediastinal lymphadenopathy  R59.0 785.6   5. Impaired mobility and ADLs  Z74.09 V49.89    Z78.9    6. Impaired functional mobility, balance, gait, and endurance  Z74.09 V49.89   7. Endocarditis of aortic valve  I35.8 424.1   8. Streptococcal bacteremia  R78.81 790.7    B95.5 041.00   9. Gastroesophageal reflux disease without esophagitis  K21.9 530.81     Patient Active Problem List   Diagnosis   • Diabetes mellitus, type II, insulin dependent (CMS/[facility])   • Essential hypertension   • Depression   • Tobacco dependence   • Sleep apnea   • Skin cancer   • Pain in both knees   • Primary osteoarthritis of both knees   • Insomnia   • Spinal instability, lumbar   • Spondylolisthesis, acquired   • Thoracic or lumbosacral neuritis or radiculitis, unspecified   • Hyperlipidemia   • Gastroesophageal reflux disease without esophagitis   • Overactive bladder   • Dyspnea on exertion   • RBBB   • Atrial flutter, paroxysmal (CMS/[facility])   • BMI 40.0-44.9, adult (CMS/[facility])   • Diabetes mellitus due to underlying condition with other skin complications (CMS/[facility])   • Chronic obstructive pulmonary disease (CMS/[facility])   • Bilateral carotid artery stenosis   • Fever   • CAP (community acquired pneumonia)   • Streptococcal bacteremia   • Lymphadenopathy, mediastinal   • Endocarditis of aortic valve     Past Medical History:   Diagnosis Date   • Arthritis of spine    • Chronic obstructive lung disease (CMS/[facility])    • Cirrhosis (CMS/[facility])    • Depression    • Diabetes mellitus (CMS/[facility])    • Hyperlipidemia    • Hypertension    • Neurologic disorder    •  Obesity    • Sleep apnea    • Tobacco dependence      Past Surgical History:   Procedure Laterality Date   • BACK SURGERY  2013    May 1 or 2, 2013   • COLONOSCOPY  01/21/2005   • COLONOSCOPY N/A 6/1/2017    Procedure: COLONOSCOPY;  Surgeon: Mat Jennings MD;  Location: [facility] ENDOSCOPY;  Service:    • ESOPHAGOSCOPY / EGD  02/25/1998    Mild esophagitis, mild gastritis, the most likely diagnosis for the esophageal inflammation is reflux esophagitis.   • EYE SURGERY      cataract, right eye   • FINGER SURGERY  04/07/1994    Partial amputation, right ring finger   • HYSTERECTOMY     • TUBAL ABDOMINAL LIGATION       General Information     Row Name 01/02/21 1336          OT Time and Intention    Document Type  therapy note (daily note)  -CS     Mode of Treatment  occupational therapy  -CS     Row Name 01/02/21 1336          Cognition    Orientation Status (Cognition)  oriented x 3  -CS       User Key  (r) = Recorded By, (t) = Taken By, (c) = Cosigned By    Initials Name Provider Type    CS Arianna Yousif COTA/CHRISSY Occupational Therapy Assistant          Mobility/ADL's     Row Name 01/02/21 1336          Bed Mobility    Supine-Sit [place] (Bed Mobility)  modified independence  -CS     Assistive Device (Bed Mobility)  bed rails  -CS       User Key  (r) = Recorded By, (t) = Taken By, (c) = Cosigned By    Initials Name Provider Type    CS Arianna Yousif COTA/CHRISSY Occupational Therapy Assistant        Obj/Interventions     Row Name 01/02/21 1336          Shoulder (Therapeutic Exercise)    Shoulder (Therapeutic Exercise)  AROM (active range of motion)  -CS     Shoulder AROM (Therapeutic Exercise)  bilateral;flexion;extension;external rotation;internal rotation  -CS     Row Name 01/02/21 1336          Elbow/Forearm (Therapeutic Exercise)    Elbow/Forearm (Therapeutic Exercise)  AROM (active range of motion)  -CS     Elbow/Forearm AROM (Therapeutic Exercise)  bilateral;flexion;extension;supination;pronation  -CS      Elbow/Forearm Strengthening (Therapeutic Exercise)  bilateral;flexion;extension;supination;pronation  -     Row Name 01/02/21 1336          Wrist (Therapeutic Exercise)    Wrist (Therapeutic Exercise)  AROM (active range of motion)  -     Wrist AROM (Therapeutic Exercise)  bilateral;flexion;extension  -     Row Name 01/02/21 1336          Hand (Therapeutic Exercise)    Hand (Therapeutic Exercise)  AROM (active range of motion)  -     Hand AROM/AAROM (Therapeutic Exercise)  bilateral;AROM (active range of motion);finger flexion;finger extension  -     Row Name 01/02/21 [facility]6          Therapeutic Exercise    Therapeutic Exercise  shoulder;elbow/forearm;wrist;hand  -       User Key  (r) = Recorded By, (t) = Taken By, (c) = Cosigned By    Initials Name Provider Type    Arianna Vela COTA/L Occupational Therapy Assistant        Goals/Plan     Row Name 01/02/21 1336          Transfer Goal 1 (OT)    Activity/Assistive Device (Transfer Goal 1, OT)  toilet  -CS     [place] Level/Cues Needed (Transfer Goal 1, OT)  standby assist;verbal cues required;tactile cues required  -CS     Time Frame (Transfer Goal 1, OT)  long term goal (LTG);by discharge  -CS     Progress/Outcome (Transfer Goal 1, OT)  goal not met  -[facility] Name 01/02/21 1336          Bathing Goal 1 (OT)    Activity/Device (Bathing Goal 1, OT)  bathing skills, all  -CS     [place] Level/Cues Needed (Bathing Goal 1, OT)  minimum assist (75% or more patient effort)  -CS     Time Frame (Bathing Goal 1, OT)  long term goal (LTG);by discharge  -CS     Progress/Outcomes (Bathing Goal 1, OT)  goal not met  -     Row Name 01/02/21 1336          Strength Goal 1 (OT)    Strength Goal 1 (OT)  Pt will engage in a 30-40 min functional task with fair safety and endurance with O2 90 or up.  -CS     Time Frame (Strength Goal 1, OT)  long term goal (LTG);by discharge  -CS     Progress/Outcome (Strength Goal 1, OT)  goal met  -       User Key   (r) = Recorded By, (t) = Taken By, (c) = Cosigned By    Initials Name Provider Type    Arianna Vela COTA/CHRISSY Occupational Therapy Assistant        Clinical Impression     Row Name 01/02/21 1336          Pain Scale: Numbers Pre/Post-Treatment    Pretreatment Pain Rating  10/10  -CS     Posttreatment Pain Rating  10/10  -CS     Pain Location  back  -CS     Pain Intervention(s)  Medication (See MAR);Repositioned  -CS     Row Name 01/02/21 1336          Plan of Care Review    Plan of Care Reviewed With  patient  -CS     Progress  improving  -CS     Row Name 01/02/21 1336          Therapy Assessment/Plan (OT)    Rehab Potential (OT)  good, to achieve stated therapy goals  -CS     Row Name 01/02/21 1336          Vital Signs    Pre Patient Position  Supine  -CS     Post Patient Position  Supine  -CS     Row Name 01/02/21 1336          Positioning and Restraints    Pre-Treatment Position  in bed  -CS     Post Treatment Position  bed  -CS     In Bed  supine;call light within reach;encouraged to call for assist;exit alarm on  -CS       User Key  (r) = Recorded By, (t) = Taken By, (c) = Cosigned By    Initials Name Provider Type    Arianna Vela COTA/CHRISSY Occupational Therapy Assistant        Outcome Measures    No documentation.       Occupational Therapy Education                 Title: PT OT SLP Therapies (Done)     Topic: Occupational Therapy (Done)     Point: ADL training (Done)     Description:   Instruct learner(s) on proper safety adaptation and remediation techniques during self care or transfers.   Instruct in proper use of assistive devices.              Learning Progress Summary           Patient Acceptance, E,TB,D, VU by CS at 1/2/2021 1452    Comment: Pt was educated on home safety and HEP.    Acceptance, E,TB, VU by LW at 12/29/2020 1319    Acceptance, E, NR by  at 12/28/2020 1434    Comment: Educated about OT and POC. Educated on safety throughout. Educated to call for assist.                    Point: Home exercise program (Done)     Description:   Instruct learner(s) on appropriate technique for monitoring, assisting and/or progressing therapeutic exercises/activities.              Learning Progress Summary           Patient Acceptance, E,TB,D, VU by  at 1/2/2021 1452    Comment: Pt was educated on home safety and HEP.    Acceptance, E,TB, VU by  at 12/29/2020 1319                   Point: Precautions (Done)     Description:   Instruct learner(s) on prescribed precautions during self-care and functional transfers.              Learning Progress Summary           Patient Acceptance, E,TB,D, VU by  at 1/2/2021 1452    Comment: Pt was educated on home safety and HEP.    Acceptance, E, VU by [place] at 12/30/2020 1332    Comment: Pt educated on OT and POC.    Acceptance, E,TB, VU by  at 12/29/2020 1319    Acceptance, E, NR by  at 12/28/2020 1434    Comment: Educated about OT and POC. Educated on safety throughout. Educated to call for assist.                   Point: Body mechanics (Done)     Description:   Instruct learner(s) on proper positioning and spine alignment during self-care, functional mobility activities and/or exercises.              Learning Progress Summary           Patient Acceptance, E,TB,D, VU by  at 1/2/2021 1452    Comment: Pt was educated on home safety and HEP.    Acceptance, E,TB, VU by  at 12/29/2020 1319                               User Key     Initials Effective Dates Name Provider Type Discipline     06/08/18 -  Rossana Alexander OTR/L Occupational Therapist OT    CS 03/07/18 -  Arianna Yousif GUTIERREZ/L Occupational Therapy Assistant OT    LW 03/07/18 -  Magdalena House COTA/L Occupational Therapy Assistant OT    [place] 11/05/19 -  Miranda Rowley OTA Occupational Therapy Assistant OT              OT Recommendation and Plan     Plan of Care Review  Plan of Care Reviewed With: patient  Progress: improving  Outcome Summary: Pt c/o pain and fatigue. Pt gave good effort with  UE ther ex. PRN rest breaks needed. Pt was educated on home safety and HEP. Continue OT POC.     Time Calculation:   Time Calculation- OT     Row Name 01/02/21 1455             Time Calculation- OT    OT Start Time  1336  -CS      OT Stop Time  1415  -CS      OT Time Calculation (min)  39 min  -CS      Total Timed Code Minutes- OT  39 minute(s)  -CS      OT Received On  01/02/21  -CS        User Key  (r) = Recorded By, (t) = Taken By, (c) = Cosigned By    Initials Name Provider Type    CS Arianna Yousif COTA/CHRISSY Occupational Therapy Assistant        Therapy Charges for Today     Code Description Service Date Service Provider Modifiers Qty    63632572059 HC OT SELF CARE/MGMT/TRAIN EA 15 MIN 1/2/2021 Arianna Yousif COTA/L GO 1    52329619986 HC OT THER PROC EA 15 MIN 1/2/2021 Arianna Yousif COTA/L GO 2               CHERISE Patel  1/2/2021

## 2021-01-02 NOTE — PLAN OF CARE
Goal Outcome Evaluation:  Plan of Care Reviewed With: patient  Progress: no change  Outcome Summary: sup-sit-stand-sit sba and v.c's for hand placement,bed to recliner with rw and sba of 1,2/10 reps seated ex

## 2021-01-03 NOTE — PLAN OF CARE
Goal Outcome Evaluation:  Plan of Care Reviewed With: patient  Progress: improving  Outcome Summary: pt ref eob/oob-performed sup ex on left but states it was to painful to perform ex on r hip-no goals met

## 2021-01-03 NOTE — PLAN OF CARE
Goal Outcome Evaluation:  Plan of Care Reviewed With: patient  Progress: improving   VSS; no significant change.  Awaiting PICC placement. Pt eager for D/c home

## 2021-01-03 NOTE — PROGRESS NOTES
[facility] Medicine Services  INPATIENT PROGRESS NOTE    Length of Stay: 6  Date of Admission: 12/26/2020  Primary Care Physician: Avelino Fitzgerald APRN    Subjective   Chief Complaint: No complaints    HPI:    1/3/21: Patient states she still has some lower back pain associated with arthritis.    1/2/21: Patient has no complaints today.  She states she is working well with therapy.    1/1/21:  Patient complained of back pain from being in the bed.  Encouraged to work with physical therapy today.      75 year old female with a history of COPD, cirrhosis, DM, HTN, and HLD who presents to the ED with fever and left flank pain.  CT of the abdomen revealed no evidence of uropathy.  Mediastinal lymphadenopathy was noted.  Chest x-ray revealed interstitial edema and/or infiltrates.  She was admitted for likely CAP and initiated on IV antiboitics.  Blood cultures grew Streptococcus infantarius.  Echocardiogram revealed small, mobile mass on the non-coronary cusp of the aortic valve measuring 8 mm.  Cardiology and CT surgery consulted and recommend 6 weeks of antibiotics.  GI was consulted and recommends EGD and colonoscopy.      12/31/20: Patient underwent EGD and colonoscopy today with no suspicious mass found.  6 polyps were removed and are pending biopsy.     Review of Systems   Constitutional: Negative for activity change and fatigue.   HENT: Negative for ear pain and sore throat.    Eyes: Negative for pain and discharge.   Respiratory: Negative for cough and shortness of breath.    Cardiovascular: Negative for chest pain and palpitations.   Gastrointestinal: Negative for abdominal pain and nausea.   Endocrine: Negative for cold intolerance and heat intolerance (  ).   Genitourinary: Negative for difficulty urinating and dysuria.   Musculoskeletal: Negative for arthralgias and gait problem.   Skin: Negative for color change and rash.   Neurological: Negative for dizziness and  weakness.   Psychiatric/Behavioral: Negative for agitation and confusion.        Objective    Temp:  [96.6 °F (35.9 °C)-97.8 °F (36.6 °C)] 97.5 °F (36.4 °C)  Heart Rate:  [54-78] 67  Resp:  [18] 18  BP: (106-162)/(51-73) 162/71    Physical Exam  Constitutional:       Appearance: She is well-developed.   HENT:      Head: Normocephalic and atraumatic.   Eyes:      Pupils: Pupils are equal, round, and reactive to light.   Neck:      Musculoskeletal: Normal range of motion and neck supple.   Cardiovascular:      Rate and Rhythm: Normal rate and regular rhythm.   Pulmonary:      Effort: Pulmonary effort is normal.      Breath sounds: Normal breath sounds.   Abdominal:      General: Bowel sounds are normal.      Palpations: Abdomen is soft.   Musculoskeletal: Normal range of motion.   Skin:     General: Skin is warm and dry.   Neurological:      Mental Status: She is alert and oriented to person, place, and time.   Psychiatric:         Behavior: Behavior normal.       Results Review:  I have reviewed the labs, radiology results, and diagnostic studies.    Laboratory Data:   Results from last 7 days   Lab Units 01/03/21  0519 01/02/21  0709 01/01/21  0645   SODIUM mmol/L 137 134* 135*   POTASSIUM mmol/L 4.8 4.5 4.7   CHLORIDE mmol/L 102 101 103   CO2 mmol/L 28.0 26.0 25.0   BUN mg/dL 13 12 10   CREATININE mg/dL 0.73 0.69 0.63   GLUCOSE mg/dL 172* 154* 154*   CALCIUM mg/dL 10.1 10.3 9.8   ANION GAP mmol/L 7.0 7.0 7.0     Estimated Creatinine Clearance: 71.5 mL/min (by C-G formula based on SCr of 0.73 mg/dL).          Results from last 7 days   Lab Units 01/03/21  0519 01/02/21  0709 01/01/21  0645 12/31/20  0555 12/30/20  0555   WBC 10*3/mm3 7.86 7.76 8.16 7.18 7.38   HEMOGLOBIN g/dL 11.9* 11.6* 11.7* 11.3* 10.9*   HEMATOCRIT % 37.5 36.6 34.9 33.9* 33.9*   PLATELETS 10*3/mm3 367 359 349 306 309           Culture Data:   No results found for: BLOODCX  No results found for: URINECX  No results found for: RESPCX  No results  found for: WOUNDCX  No results found for: STOOLCX  No components found for: BODYFLD    Radiology Data:   Imaging Results (Last 24 Hours)     ** No results found for the last 24 hours. **          I have reviewed the patient's current medications.     Assessment/Plan     Active Hospital Problems    Diagnosis   • **Endocarditis of aortic valve   • CAP (community acquired pneumonia)   • Streptococcal bacteremia   • Lymphadenopathy, mediastinal   • Fever   • Atrial flutter, paroxysmal (CMS/[facility])   • Gastroesophageal reflux disease without esophagitis   • Essential hypertension   • Diabetes mellitus, type II, insulin dependent (CMS/[facility])       Plan:    1.  Endocarditis of the aortic valve: Rocephin to 2 grams daily, will need 6 week course.  Case management working on getting home antibiotics set up.  Cardiology consult appreciated.  CT surgery consultation appreciated  2.  Bacteremia, streptococcus infantarius: GI consultation appreciated.  No obvious mass found on colonoscopy.  6 polyps have been removed and are pending biopsy.  3.  Hypertension: Continue lisinopril and lopressor.  4.  Paroxysmal atrial flutter: Continue Lopressor and Eliquis.    5.  Diabetes mellitus, type II: Continue Levemir and SSI.  6.  Deconditioning: Continue PT/OT.  7.  Chronic back pain: Continue as needed Norco.  Patient educated that she may need to establish with a pain clinic as she has had issues with this for a while.    Discharge planning: Cardiology 6 week follow-up with echocardiogram, CT surgery 6 week follow-up to assess mediastinal lymphadenopathy and consideration of biopsy.      Discharge Planning: I expect patient to be discharged to home in 1-2 days.      This document has been electronically signed by MAMTA Hamlin on January 3, 2021 13:01 CST

## 2021-01-04 NOTE — DISCHARGE SUMMARY
[facility] Medicine Services  DISCHARGE SUMMARY       Date of Admission: 12/26/2020  Date of Discharge:  1/4/2021  Primary Care Physician: Avelino Fitzgerald APRN    Presenting Problem/History of Present Illness:  Mediastinal lymphadenopathy [R59.0]  Lung infiltrate [R91.8]  Fever, unspecified fever cause [R50.9]  Sepsis, due to unspecified organism, unspecified whether acute organ dysfunction present (CMS/[facility]) [A41.9]  Endocarditis [I38]     Final Discharge Diagnoses:  Active Hospital Problems    Diagnosis   • **Endocarditis of aortic valve   • CAP (community acquired pneumonia)   • Streptococcal bacteremia   • Lymphadenopathy, mediastinal   • Fever   • Atrial flutter, paroxysmal (CMS/[facility])   • Gastroesophageal reflux disease without esophagitis   • Essential hypertension   • Diabetes mellitus, type II, insulin dependent (CMS/HCC)       Consults:   Consults     Date and Time Order Name Status Description    12/29/2020 1505 Inpatient Gastroenterology Consult      12/29/2020 0909 Inpatient Gastroenterology Consult Completed     12/28/2020 1303 Inpatient Cardiothoracic Surgery Consult Completed     12/28/2020 1250 Inpatient Cardiology Consult Completed           Procedures Performed: Procedure(s):  ESOPHAGOGASTRODUODENOSCOPY WITH CONTROL OF BLEED  COLONOSCOPY WITH BIOPSY                Pertinent Test Results:   Lab Results (last 24 hours)     Procedure Component Value Units Date/Time    POC Glucose Once [[id number]]  (Abnormal) Collected: 01/04/21 1021    Specimen: Blood Updated: 01/04/21 1050     Glucose 177 mg/dL      Comment: RN NotifiedOperator: [id number] BARON [facility] ID: [id number]       Basic Metabolic Panel [[id number]]  (Abnormal) Collected: 01/04/21 0546    Specimen: Blood Updated: 01/04/21 0701     Glucose 166 mg/dL      BUN 12 mg/dL      Creatinine 0.67 mg/dL      Sodium 137 mmol/L      Potassium 4.5 mmol/L      Chloride 103 mmol/L      CO2 27.0 mmol/L       Calcium 10.3 mg/dL      eGFR Non African Amer 86 mL/min/1.73      BUN/Creatinine Ratio 17.9     Anion Gap 7.0 mmol/L     Narrative:      GFR Normal >60  Chronic Kidney Disease <60  Kidney Failure <15      CBC & Differential [[id number]]  (Abnormal) Collected: 01/04/21 0546    Specimen: Blood Updated: 01/04/21 0651    Narrative:      The following orders were created for panel order CBC & Differential.  Procedure                               Abnormality         Status                     ---------                               -----------         ------                     CBC Auto Differential[[id number]]        Abnormal            Final result                 Please view results for these tests on the individual orders.    CBC Auto Differential [[id number]]  (Abnormal) Collected: 01/04/21 0546    Specimen: Blood Updated: 01/04/21 0651     WBC 8.66 10*3/mm3      RBC 4.33 10*6/mm3      Hemoglobin 12.3 g/dL      Hematocrit 37.5 %      MCV 86.6 fL      MCH 28.4 pg      MCHC 32.8 g/dL      RDW 14.0 %      RDW-SD 44.1 fl      MPV 8.9 fL      Platelets 364 10*3/mm3      Neutrophil % 62.0 %      Lymphocyte % 22.2 %      Monocyte % 7.3 %      Eosinophil % 7.2 %      Basophil % 0.6 %      Immature Grans % 0.7 %      Neutrophils, Absolute 5.38 10*3/mm3      Lymphocytes, Absolute 1.92 10*3/mm3      Monocytes, Absolute 0.63 10*3/mm3      Eosinophils, Absolute 0.62 10*3/mm3      Basophils, Absolute 0.05 10*3/mm3      Immature Grans, Absolute 0.06 10*3/mm3      nRBC 0.0 /100 WBC     Tissue Pathology Exam [[id number]] Collected: 12/31/20 1119    Specimen: Tissue from Small Intestine, Duodenum; Tissue from Gastric, Antrum; Tissue from Esophagus, Distal; Polyp from Large Intestine, Right / Ascending Colon; Polyp from Large Intestine, Transverse Colon; Polyp from Large Intestine, Left / Descending Colon; Tissue from Large Intestine, Sigmoid Colon; Polyp from Large Intestine, Sigmoid Colon; Polyp from Large Intestine, Rectum Updated:  01/04/21 0650    POC Glucose Once [[id number]]  (Abnormal) Collected: 01/04/21 0612    Specimen: Blood Updated: 01/04/21 0632     Glucose 150 mg/dL      Comment: RN NotifiedOperator: [id number] LUIS TOWNSENDMeter ID: [id number]       POC Glucose Once [[id number]]  (Abnormal) Collected: 01/03/21 1932    Specimen: Blood Updated: 01/03/21 2003     Glucose 213 mg/dL      Comment: RN NotifiedOperator: [id number] BENY SMITHMeter ID: [id number]       POC Glucose Once [[id number]]  (Abnormal) Collected: 01/03/21 1643    Specimen: Blood Updated: 01/03/21 1657     Glucose 190 mg/dL      Comment: RN NotifiedOperator: [id number] BARON GILLAHMeter ID: [id number]           Imaging Results (Last 24 Hours)     Procedure Component Value Units Date/Time    US Guided Vascular Access [[id number]] Resulted: 01/04/21 1302     Updated: 01/04/21 1302    Narrative:      This procedure was auto-finalized with no dictation required.    IR Insert Midline Without Port Pump 5 Plus [[id number]] Resulted: 01/04/21 1132     Updated: 01/04/21 1132    Narrative:      This procedure was auto-finalized with no dictation required.        Chief Complaint on Day of Discharge: No complaints    Hospital Course:  This is a 75 year old female with a history of COPD, cirrhosis, DM, HTN, and HLD who presented to the ED with fever and left flank pain.  CT of the abdomen revealed no evidence of uropathy.  Mediastinal lymphadenopathy was noted.  Chest x-ray revealed interstitial edema and/or infiltrates.  She was admitted for likely CAP and initiated on IV antiboitics.  Blood cultures grew Streptococcus infantarius.  Echocardiogram revealed small, mobile mass on the non-coronary cusp of the aortic valve measuring 8 mm.  Cardiology and CT surgery consulted and recommend 6 weeks of antibiotics for endocarditis.  The patient was seen by gastroenterology and underwent EGD and colonoscopy by Dr. Gramajo with the following results:    EGD  - Normal hypopharynx.  -  "LA Grade B reflux esophagitis. Biopsied.  - Chronic gastritis. Biopsied.  - Normal examined duodenum. Biopsied.  - Anemia of Chronic Blood Loss    Colonoscopy    - One 6 mm polyp in the ascending colon, removed with a cold biopsy forceps. Resected and retrieved.  - One 5 mm polyp in the transverse colon, removed with a cold biopsy forceps. Resected and  retrieved.  - One 5 mm polyp in the descending colon, removed with a cold biopsy forceps. Resected and  retrieved.  - One 6 mm polyp in the descending colon, removed with a cold biopsy forceps. Resected and retrieved.  - Two 3 to 6 mm polyps in the sigmoid colon, removed with a cold biopsy forceps. Resected and retrieved.  - One 5 mm polyp in the rectum, removed with a cold biopsy forceps. Resected and retrieved.  - Diverticulosis in the sigmoid colon and in the descending colon.  - Erythematous mucosa in the distal sigmoid colon. Biopsied.  - Non-bleeding internal hemorrhoids.  - The examination was otherwise normal.  - Anemia of Chronic Blood Loss  - + Strep bovi infection ----possibly related to diverticulitis    The patient was started on Rocephin 2 grams daily x 6 weeks.  Antibiotics were set up with [facility] and home health will administer midline care, infusion therapy, PT and OT.      The patient will follow up in 1 weeks with PCP.  Follow with [facility] for possible biopsy of mediastinal lymph nodes in 3 weeks.  Follow with Dr. Gramajo for results of polyp biopsies in 1 month.  Repeat echocardiogram scheduled 2/15/21 at 0930, with follow up by Dr. Rainey on 2/19/21 at 0900.      The patient was given 3 days of Norco at discharge.     Condition on Discharge:  Stable    Physical Exam on Discharge:  /72 (BP Location: Right arm, Patient Position: Lying)   Pulse 66   Temp 97.8 °F (36.6 °C) (Oral)   Resp 16   Ht 162.6 cm (64.02\")   Wt 98.6 kg (217 lb 6.4 oz)   SpO2 92%   BMI 37.30 kg/m²   Physical Exam  Constitutional:       Appearance: She is " well-developed.   HENT:      Head: Normocephalic and atraumatic.   Eyes:      Pupils: Pupils are equal, round, and reactive to light.   Neck:      Musculoskeletal: Normal range of motion and neck supple.   Cardiovascular:      Rate and Rhythm: Normal rate and regular rhythm.   Pulmonary:      Effort: Pulmonary effort is normal.      Breath sounds: Normal breath sounds.   Abdominal:      General: Bowel sounds are normal.      Palpations: Abdomen is soft.   Musculoskeletal: Normal range of motion.   Skin:     General: Skin is warm and dry.   Neurological:      Mental Status: She is alert and oriented to person, place, and time.   Psychiatric:         Behavior: Behavior normal.       Discharge Disposition:  Home-Health Care [facility]    Discharge Medications:     Discharge Medications      New Medications      Instructions Start Date   cefTRIAXone  Commonly known as: ROCEPHIN   2 g, Intravenous, Every 24 Hours      HYDROcodone-acetaminophen  MG per tablet  Commonly known as: NORCO   Take 1 tablet by mouth Every 4 (Four) Hours As Needed for Moderate Pain for up to 3 days.         Changes to Medications      Instructions Start Date   betamethasone dipropionate 0.05 % ointment  Commonly known as: DIPROLENE  What changed:   · when to take this  · reasons to take this   Topical, 2 Times Daily      Insulin Glargine 100 UNIT/ML injection pen  Commonly known as: BASAGLARMANDO VYAS  What changed: how much to take   50 Units, Subcutaneous, Nightly         Continue These Medications      Instructions Start Date   Accu-Chek Anabel Plus test strip  Generic drug: glucose blood   TEST EVERY DAY AS DIRECTED      albuterol sulfate  (90 Base) MCG/ACT inhaler  Commonly known as: Ventolin HFA   2 puffs, Inhalation, Every 6 Hours PRN      ALPRAZolam 0.25 MG tablet  Commonly known as: XANAX   0.25 mg      apixaban 5 MG tablet tablet  Commonly known as: ELIQUIS   5 mg, Oral, Every 12 Hours Scheduled      B-D UF III MINI PEN NEEDLES 31G X  5 MM misc  Generic drug: Insulin Pen Needle   USE ONCE DAILY      BREO ELLIPTA IN   Inhalation      cholecalciferol 25 MCG (1000 UT) tablet  Commonly known as: VITAMIN D3   1,000 Units, Oral, Daily      ferrous sulfate 325 (65 FE) MG tablet   325 mg, Oral, 2 Times Daily      fish oil 1000 MG capsule capsule   1,000 mg, Oral, 2 Times Daily With Meals      lisinopril 5 MG tablet  Commonly known as: PRINIVIL,ZESTRIL   TAKE ONE-HALF TABLET EVERY DAY      lovastatin 40 MG tablet  Commonly known as: MEVACOR   40 mg, Oral, Nightly      metFORMIN 1000 MG tablet  Commonly known as: GLUCOPHAGE   TAKE 1 TABLET TWICE DAILY WITH MEALS      metoprolol tartrate 25 MG tablet  Commonly known as: LOPRESSOR   25 mg, Oral, 2 Times Daily      Mirabegron ER 25 MG tablet sustained-release 24 hour 24 hr tablet  Commonly known as: Myrbetriq   25 mg, Oral, Daily      omeprazole 40 MG capsule  Commonly known as: priLOSEC   TAKE 1 CAPSULE EVERY DAY      traZODone 50 MG tablet  Commonly known as: DESYREL   TAKE 1 TABLET EVERY NIGHT      venlafaxine XR 75 MG 24 hr capsule  Commonly known as: EFFEXOR-XR   TAKE 1 CAPSULE EVERY DAY      vitamin b complex capsule capsule   1 capsule, Oral, Daily         Stop These Medications    aspirin 81 MG EC tablet            Discharge Diet: Cardiac, diabetic    Activity at Discharge: As tolerated    Discharge Care Plan/Instructions: As above    Follow-up Appointment:  Additional Instructions for the Follow-ups that You Need to Schedule     Ambulatory Referral to Home Health   As directed      Face to Face Visit Date: 1/4/2021    Follow-up provider for Plan of Care?: I treated the patient in an acute care facility and will not continue treatment after discharge.    Follow-up provider: IVON COURTNEY [[id number]]    Reason/Clinical Findings: endocarditis/ bacteremia/ colon polyp removal/ deconditioning    Describe mobility limitations that make leaving home difficult: endocarditis/ bacteremia/ colon polyp removal/  deconditioning    Nursing/Therapeutic Services Requested: Skilled Nursing (Rocephin IV daily per order. ) Physical Therapy Occupational Therapy    Skilled nursing orders: Medication education PICC line care/instruction Infusion therapy Cardiopulmonary assessments    PT orders: Therapeutic exercise Gait Training Transfer training Strengthening Home safety assessment    Weight Bearing Status: As Tolerated    Occupational orders: Activities of daily living Energy conservation Strengthening Home safety assessment    Frequency: 1 Week 1            Contact information for follow-up providers     Avelino Fitzgerald APRN. Go on 1/11/2021.    Specialties: Nurse Practitioner, Family Medicine, Emergency Medicine  Why: MONDAY JANUARY 11TH 3:30 HOSPITAL FOLLOW UP APPOINTMENT  Contact information:  [address]  [address]  [place] [id number] [phone]             Moiz Gregory APRN Follow up in 6 week(s).    Specialty: Cardiothoracic Surgery  Why: assess mediastinal lymphadenopathy and consideration of biopsy.  OFFICE WILL CALL TO SCHEDULE HOSPITAL FOLLOW UP APPOINTMENT  Contact information:  [address] [facility] [address] [phone]             Brendon Gramajo DO. Go on 2/1/2021.    Specialty: Gastroenterology  Why: MONDAY FEBRUARY 1ST 9:30 HOSPITAL FOLLOW UP APPOINTMENT  Contact information:  [address]  [address]  [place] [address]  [phone]             Nettie Rainey MD .    Specialty: Cardiology  Contact information:  [address] [facility] [address]  [place] [address]  [phone]                   Contact information for after-discharge care     Durable Medical Equipment     [facility] .    Service: Durable Medical Equipment  Contact information:  [address] [facility] [address]  [facility] [address]  [phone]                 Home Medical Care     [facility] [place] .    Service: Home Health Services  Contact information:  [address] [place] [address]  [phone]                             Test Results Pending at Discharge:   Pending Labs     Order Current Status    Tissue Pathology Exam In process            This document has been electronically signed by MAMTA Hamlin on January 4, 2021 13:17 CST        Time: Greater than 30 minutes.

## 2021-01-04 NOTE — PROGRESS NOTES
TWO PATIENT IDENTIFIERS WERE USED. THE PATIENT WAS DRAPED WITH A FULL BODY DRAPE AND THE PATIENT'S RIGHT ARM WAS PREPPED WITH CHLORA PREP. ULTRASOUND WAS USED TO LOCALIZE THERIGHT BASILIC VEIN. SUBCUTANEOUS TISSUE AT THE CATHETER SITE WAS INFILTRATED WITH 2% LIDOCAINE. UNDER ULTRASOUND GUIDANCE, THE VEIN WAS ACCESSED WITH A 21 GAUGE  NEEDLE. AN 0.018 WIRE WAS THEN THREADED THROUGH THE NEEDLE. THE 21 GAUGE NEEDLE WAS REMOVED AND A 4 Divehi SHEATH WAS PLACED OVER THE WIRE INTO THE VEIN.THE MIDLINE CATHETER WAS TRIMMED TO 20CM. THE MIDLINE CATHETER WAS THEN PLACED OVER THE WIRE INTO THE VEIN, THE SHEATH WAS PEELED AWAY, WIRE WAS REMOVED. CATHETER WAS FLUSHED WITH NORMAL SALINE AND CATHETER TIP APPLIED. BIOPATCH PLACED. CATHETER SECURED WITH STAT LOCK AND TEGADERM. PATIENT TOLERATED PROCEDURE WELL. THIS WAS DONE IN THE ANGIOSUITE      IMPRESSION:SUCCESSFUL PLACEMENT OF DUAL LUMEN MIDLINE.           Ginger Meza  1/4/2021  11:31 CST

## 2021-01-04 NOTE — THERAPY TREATMENT NOTE
Patient Name: Michelle Pierce  : 1945    MRN: [id number]                              Today's Date: 2021       Admit Date: 2020    Visit Dx:     ICD-10-CM ICD-9-CM   1. Fever, unspecified fever cause  R50.9 780.60   2. Sepsis, due to unspecified organism, unspecified whether acute organ dysfunction present (CMS/HCC)  A41.9 038.9     995.91   3. Lung infiltrate  R91.8 793.19   4. Mediastinal lymphadenopathy  R59.0 785.6   5. Impaired mobility and ADLs  Z74.09 V49.89    Z78.9    6. Impaired functional mobility, balance, gait, and endurance  Z74.09 V49.89   7. Endocarditis of aortic valve  I35.8 424.1   8. Streptococcal bacteremia  R78.81 790.7    B95.5 041.00   9. Gastroesophageal reflux disease without esophagitis  K21.9 530.81   10. Diabetes mellitus, type II, insulin dependent (CMS/HCC)  E11.9 250.00    Z79.4 V58.67   11. Essential hypertension  I10 401.9   12. Atrial flutter, paroxysmal (CMS/HCC)  I48.92 427.32   13. Lymphadenopathy, mediastinal  R59.0 785.6   14. Mild episode of recurrent major depressive disorder (CMS/HCC)  F33.0 296.31   15. Tobacco dependence  F17.200 305.1   16. Obstructive sleep apnea syndrome  G47.33 327.23   17. Skin cancer  C44.90 173.90   18. Pain in both knees, unspecified chronicity  M25.561 719.46    M25.562    19. Primary osteoarthritis of both knees  M17.0 715.16   20. Primary insomnia  F51.01 307.42   21. Spinal instability, lumbar  M53.2X6 724.9   22. Spondylolisthesis, acquired  M43.10 738.4   23. Mixed hyperlipidemia  E78.2 272.2   24. Overactive bladder  N32.81 596.51   25. Dyspnea on exertion  R06.00 786.09   26. RBBB  I45.10 426.4   27. BMI 40.0-44.9, adult (CMS/HCC)  Z68.41 V85.41   28. Diabetes mellitus due to underlying condition with other skin complications (CMS/[facility])  E08.628 249.80     709.8   29. Chronic obstructive pulmonary disease, unspecified COPD type (CMS/[facility])  J44.9 496   30. Bilateral carotid artery stenosis  I65.23 433.10     433.30      Patient Active Problem List   Diagnosis   • Diabetes mellitus, type II, insulin dependent (CMS/[facility])   • Essential hypertension   • Depression   • Tobacco dependence   • Sleep apnea   • Skin cancer   • Pain in both knees   • Primary osteoarthritis of both knees   • Insomnia   • Spinal instability, lumbar   • Spondylolisthesis, acquired   • Thoracic or lumbosacral neuritis or radiculitis, unspecified   • Hyperlipidemia   • Gastroesophageal reflux disease without esophagitis   • Overactive bladder   • Dyspnea on exertion   • RBBB   • Atrial flutter, paroxysmal (CMS/[facility])   • BMI 40.0-44.9, adult (CMS/[facility])   • Diabetes mellitus due to underlying condition with other skin complications (CMS/[facility])   • Chronic obstructive pulmonary disease (CMS/[facility])   • Bilateral carotid artery stenosis   • Fever   • CAP (community acquired pneumonia)   • Streptococcal bacteremia   • Lymphadenopathy, mediastinal   • Endocarditis of aortic valve     Past Medical History:   Diagnosis Date   • Arthritis of spine    • Chronic obstructive lung disease (CMS/[facility])    • Cirrhosis (CMS/[facility])    • Depression    • Diabetes mellitus (CMS/[facility])    • Hyperlipidemia    • Hypertension    • Neurologic disorder    • Obesity    • Sleep apnea    • Tobacco dependence      Past Surgical History:   Procedure Laterality Date   • BACK SURGERY  2013    May 1 or 2, 2013   • COLONOSCOPY  01/21/2005   • COLONOSCOPY N/A 6/1/2017    Procedure: COLONOSCOPY;  Surgeon: Mat Jennings MD;  Location: [facility] ENDOSCOPY;  Service:    • ESOPHAGOSCOPY / EGD  02/25/1998    Mild esophagitis, mild gastritis, the most likely diagnosis for the esophageal inflammation is reflux esophagitis.   • EYE SURGERY      cataract, right eye   • FINGER SURGERY  04/07/1994    Partial amputation, right ring finger   • HYSTERECTOMY     • TUBAL ABDOMINAL LIGATION       General Information     Row Name 01/04/21 0958          OT Time and Intention    Document Type  therapy note (daily note)  -CS      Mode of Treatment  occupational therapy  -     Row Name 01/04/21 0958          General Information    Existing Precautions/Restrictions  fall;oxygen therapy device and L/min  -CS     Row Name 01/04/21 0958          Cognition    Orientation Status (Cognition)  oriented x 3  -     Row Name 01/04/21 0958          Safety Issues, Functional Mobility    Impairments Affecting Function (Mobility)  balance;endurance/activity tolerance;pain;strength;shortness of breath  -CS       User Key  (r) = Recorded By, (t) = Taken By, (c) = Cosigned By    Initials Name Provider Type    Arianna Vela COTA/CHRISSY Occupational Therapy Assistant          Mobility/ADL's     Row Name 01/04/21 0958          Bed Mobility    Bed Mobility  -- Pt deferred any EOB/OOB due to back pain.  -CS       User Key  (r) = Recorded By, (t) = Taken By, (c) = Cosigned By    Initials Name Provider Type    Arianna Vela COTA/CHRISSY Occupational Therapy Assistant        Obj/Interventions     Row Name 01/04/21 0958          Hand (Therapeutic Exercise)    Hand AROM/AAROM (Therapeutic Exercise)  bilateral;AROM (active range of motion);other (see comments) yellow theraputty exercises  -     Row Name 01/04/21 0958          Therapeutic Exercise    Therapeutic Exercise  hand  -CS       User Key  (r) = Recorded By, (t) = Taken By, (c) = Cosigned By    Initials Name Provider Type    Arianna Vela COTA/CHRISSY Occupational Therapy Assistant        Goals/Plan     Row Name 01/04/21 0958          Transfer Goal 1 (OT)    Activity/Assistive Device (Transfer Goal 1, OT)  toilet  -CS     [place] Level/Cues Needed (Transfer Goal 1, OT)  standby assist;verbal cues required;tactile cues required  -     Time Frame (Transfer Goal 1, OT)  long term goal (LTG);by discharge  -CS     Progress/Outcome (Transfer Goal 1, OT)  goal not met  -CS     Row Name 01/04/21 0958          Bathing Goal 1 (OT)    Activity/Device (Bathing Goal 1, OT)  bathing skills, all  -CS      [place] Level/Cues Needed (Bathing Goal 1, OT)  minimum assist (75% or more patient effort)  -CS     Time Frame (Bathing Goal 1, OT)  long term goal (LTG);by discharge  -CS     Progress/Outcomes (Bathing Goal 1, OT)  goal not met  -CS     Row Name 01/04/21 0958          Strength Goal 1 (OT)    Strength Goal 1 (OT)  Pt will engage in a 30-40 min functional task with fair safety and endurance with O2 90 or up.  -CS     Time Frame (Strength Goal 1, OT)  long term goal (LTG);by discharge  -CS     Progress/Outcome (Strength Goal 1, OT)  goal met  -CS       User Key  (r) = Recorded By, (t) = Taken By, (c) = Cosigned By    Initials Name Provider Type    CS Arianna Yousif COTA/L Occupational Therapy Assistant        Clinical Impression     Row Name 01/04/21 0958          Pain Scale: Numbers Pre/Post-Treatment    Pretreatment Pain Rating  10/10  -CS     Posttreatment Pain Rating  10/10  -CS     Pain Location  back  -CS     Pain Intervention(s)  Medication (See MAR)  -CS     Row Name 01/04/21 0958          Plan of Care Review    Plan of Care Reviewed With  patient  -CS     Progress  no change  -CS     Row Name 01/04/21 0958          Therapy Assessment/Plan (OT)    Rehab Potential (OT)  good, to achieve stated therapy goals  -CS     Row Name 01/04/21 0958          Vital Signs    Pretreatment Heart Rate (beats/min)  71  -CS     Pre SpO2 (%)  91  -CS     O2 Delivery Pre Treatment  supplemental O2  -CS     Pre Patient Position  Supine  -CS     Post Patient Position  Supine  -CS     Row Name 01/04/21 0958          Positioning and Restraints    Pre-Treatment Position  in bed  -CS     Post Treatment Position  bed  -CS     In Bed  supine;call light within reach;encouraged to call for assist;exit alarm on  -CS       User Key  (r) = Recorded By, (t) = Taken By, (c) = Cosigned By    Initials Name Provider Type    Arianna Vela COTA/CHRISSY Occupational Therapy Assistant        Outcome Measures    No documentation.        Occupational Therapy Education                 Title: PT OT SLP Therapies (Done)     Topic: Occupational Therapy (Done)     Point: ADL training (Done)     Description:   Instruct learner(s) on proper safety adaptation and remediation techniques during self care or transfers.   Instruct in proper use of assistive devices.              Learning Progress Summary           Patient Acceptance, E,TB,D,H, VU by CS at 1/4/2021 1151    Comment: Pt was educated on theraputty exercises. EC/WS, and home safety/fall prev.    Acceptance, E,TB,D, VU by CS at 1/2/2021 1452    Comment: Pt was educated on home safety and HEP.    Acceptance, E,TB, VU by LW at 12/29/2020 1319    Acceptance, E, NR by  at 12/28/2020 1434    Comment: Educated about OT and POC. Educated on safety throughout. Educated to call for assist.                   Point: Home exercise program (Done)     Description:   Instruct learner(s) on appropriate technique for monitoring, assisting and/or progressing therapeutic exercises/activities.              Learning Progress Summary           Patient Acceptance, E,TB,D,H, VU by CS at 1/4/2021 1151    Comment: Pt was educated on theraputty exercises. EC/WS, and home safety/fall prev.    Acceptance, E,TB,D, VU by CS at 1/2/2021 1452    Comment: Pt was educated on home safety and HEP.    Acceptance, E,TB, VU by LW at 12/29/2020 1319                   Point: Precautions (Done)     Description:   Instruct learner(s) on prescribed precautions during self-care and functional transfers.              Learning Progress Summary           Patient Acceptance, E,TB,D,H, VU by CS at 1/4/2021 1151    Comment: Pt was educated on theraputty exercises. EC/WS, and home safety/fall prev.    Acceptance, E,TB,D, VU by CS at 1/2/2021 1452    Comment: Pt was educated on home safety and HEP.    Acceptance, E, VU by [place] at 12/30/2020 1332    Comment: Pt educated on OT and POC.    Acceptance, E,TB, VU by LW at 12/29/2020 1319    Acceptance, E,  NR by  at 12/28/2020 1434    Comment: Educated about OT and POC. Educated on safety throughout. Educated to call for assist.                   Point: Body mechanics (Done)     Description:   Instruct learner(s) on proper positioning and spine alignment during self-care, functional mobility activities and/or exercises.              Learning Progress Summary           Patient Acceptance, E,TB,D,H, VU by  at 1/4/2021 1151    Comment: Pt was educated on theraputty exercises. EC/WS, and home safety/fall prev.    Acceptance, E,TB,D, VU by  at 1/2/2021 1452    Comment: Pt was educated on home safety and HEP.    Acceptance, E,TB, VU by  at 12/29/2020 1319                               User Key     Initials Effective Dates Name Provider Type Discipline     06/08/18 -  Rossana Alexander OTR/L Occupational Therapist OT     03/07/18 -  Arianna Yousif, GUTIERREZ/CHRISSY Occupational Therapy Assistant OT    LW 03/07/18 -  Magdalena House GUTIERREZ/L Occupational Therapy Assistant OT    [place] 11/05/19 -  Miranda Rowley OTA Occupational Therapy Assistant OT              OT Recommendation and Plan     Plan of Care Review  Plan of Care Reviewed With: patient  Progress: no change  Outcome Summary: Pt c/o pain and fatigue. Pt gave good effort with UE ther ex. PRN rest breaks needed. Pt was educated on home safety and HEP. Continue OT POC.     Time Calculation:   Time Calculation- OT     Row Name 01/04/21 1152             Time Calculation- OT    OT Start Time  0958  -CS      OT Stop Time  1036  -      OT Time Calculation (min)  38 min  -      Total Timed Code Minutes- OT  38 minute(s)  -      OT Received On  01/04/21  -        User Key  (r) = Recorded By, (t) = Taken By, (c) = Cosigned By    Initials Name Provider Type    CS Arianna Yousif, GUTIERREZ/L Occupational Therapy Assistant        Therapy Charges for Today     Code Description Service Date Service Provider Modifiers Qty    33929814798 HC OT THER PROC EA 15 MIN 1/4/2021  Arianna Yousif COTA/L GO 2    63074798900 HC OT SELF CARE/MGMT/TRAIN EA 15 MIN 1/4/2021 Arianna Yousif COTA/L GO 1               CHERISE Patel  1/4/2021

## 2021-01-05 NOTE — OUTREACH NOTE
Call Center TCM Note      Responses   [facility] patient discharged from?  [facility]   Does the patient have one of the following disease processes/diagnoses(primary or secondary)?  Other   TCM attempt successful?  Yes   Call start time  1508   Call end time  1510   Discharge diagnosis  Endocarditis of aortic valve   Meds reviewed with patient/caregiver?  Yes   Is the patient having any side effects they believe may be caused by any medication additions or changes?  No   Does the patient have all medications ordered at discharge?  Yes   Is the patient taking all medications as directed (includes completed medication regime)?  Yes   Does the patient have a primary care provider?   Yes   Does the patient have an appointment with their PCP within 7 days of discharge?  Yes   Comments regarding PCP  Has a followup on 1/11/2021 with Sancho Cortney for a hospital followup   Has the patient kept scheduled appointments due by today?  N/A   Has home health visited the patient within 72 hours of discharge?  N/A   Psychosocial issues?  No   Did the patient receive a copy of their discharge instructions?  Yes   Nursing interventions  Reviewed instructions with patient   What is the patient's perception of their health status since discharge?  Improving   Is the patient/caregiver able to teach back signs and symptoms related to disease process for when to call PCP?  Yes   Is the patient/caregiver able to teach back signs and symptoms related to disease process for when to call 911?  Yes   Is the patient/caregiver able to teach back the hierarchy of who to call/visit for symptoms/problems? PCP, Specialist, Home health nurse, Urgent Care, ED, 911  Yes   If the patient is a current smoker, are they able to teach back resources for cessation?  Smoking cessation medications   TCM call completed?  Yes          Donny Peterson RN    1/5/2021, 15:10 EST

## 2021-01-05 NOTE — OUTREACH NOTE
Prep Survey      Responses   [facility] facility patient discharged from?  [facility]   Is LACE score < 7 ?  No   Emergency Room discharge w/ pulse ox?  No   Eligibility  [facility]   Date of Admission  12/26/20   Date of Discharge  01/04/21   Discharge Disposition  Home or Self Care   Discharge diagnosis  Endocarditis of aortic valve   Does the patient have one of the following disease processes/diagnoses(primary or secondary)?  Other   Does the patient have Home health ordered?  No   Is there a DME ordered?  No   Prep survey completed?  Yes          Lynnette Rhodes RN

## 2021-01-07 NOTE — TELEPHONE ENCOUNTER
Patient was called to let her know prescriptions were sent to [facility].

## 2021-01-07 NOTE — TELEPHONE ENCOUNTER
PATIENTS DAUGHTER CALLING TO SEE IF HER MOTHER COULD GET MORE HYDROcodone-acetaminophen (NORCO)  MG per tablet CALLED IN FOR THE WEEKEND SO SHE DOESN'T RUN OUT BEFORE HER APPT WITH DR COURTNEY ON Monday.    PLEASE CALL AND ADVISE  [phone] (H)    [facility] - [place], [place] - [address] [phone] [facility] [phone] FX

## 2021-01-07 NOTE — TELEPHONE ENCOUNTER
3-day supply sent to pharmacy.  She needs to take only as needed as I beginning to taper down her dose.  Take with caution.  Do not mix with other sedating medications or alcohol.

## 2021-01-07 NOTE — PROGRESS NOTES
Candido reviewed and appropriate for Norco prescription refilled on 1/7/2020.  72-hour quantity prescribed.  Dosage decreased from 10 mg Norco's to 7.5 mg Norco we will continue to taper and discontinue.

## 2021-01-07 NOTE — TELEPHONE ENCOUNTER
Daughter calling stating home health nurse said she needs a order put I for a potty chair  Please advise        [facility] - [place], [place] - [address] [phone] [facility] [phone] FX

## 2021-01-11 NOTE — PROGRESS NOTES
Transitional Care Follow Up Visit  Subjective     Michelle Pierce is a 75 y.o. female who presents for a transitional care management visit.    Within 48 business hours after discharge our office contacted her via telephone to coordinate her care and needs.      I reviewed and discussed the details of that call along with the discharge summary, hospital problems, inpatient lab results, inpatient diagnostic studies, and consultation reports with Michelle.     Current outpatient and discharge medications have been reconciled for the patient.  Reviewed by: MAMTA Gifford      Date of TCM Phone Call 1/4/2021   [facility]   Date of Admission 12/26/2020   Date of Discharge 1/4/2021   Discharge Disposition Home or Self Care     Risk for Readmission (LACE) Score: 14 (1/4/2021  5:00 AM)      COPD  She complains of chest tightness, cough, difficulty breathing, shortness of breath and wheezing. This is a chronic problem. The current episode started more than 1 year ago. The problem occurs intermittently. The problem has been waxing and waning. The cough is productive of sputum and productive. Associated symptoms include appetite change, dyspnea on exertion and malaise/fatigue. Pertinent negatives include no chest pain, fever, orthopnea, PND or postnasal drip. Her symptoms are aggravated by minimal activity. Her symptoms are alleviated by rest, steroid inhaler and beta-agonist. She reports moderate improvement on treatment. Risk factors for lung disease include smoking/tobacco exposure. Her past medical history is significant for COPD and pneumonia.   Pneumonia  She complains of chest tightness, cough, difficulty breathing, shortness of breath and wheezing. This is a new problem. The current episode started 1 to 4 weeks ago. The problem has been gradually improving. The cough is productive of sputum and productive. Associated symptoms include appetite change, dyspnea on exertion and malaise/fatigue. Pertinent  negatives include no chest pain, fever, orthopnea, PND or postnasal drip. Her symptoms are aggravated by minimal activity. Her symptoms are alleviated by rest, steroid inhaler and beta-agonist. She reports moderate improvement on treatment. Risk factors for lung disease include smoking/tobacco exposure. Her past medical history is significant for COPD and pneumonia.   Heart Problem  This is a new (endocarditis) problem. The current episode started 1 to 4 weeks ago. The problem has been gradually improving. Associated symptoms include anorexia (decreased appetite), coughing, fatigue and weakness. Pertinent negatives include no chest pain, chills or fever. The symptoms are aggravated by walking. Treatments tried: IV antibiotics. The treatment provided mild relief.   Back Pain  This is a chronic problem. The current episode started more than 1 year ago. The problem occurs daily. The problem has been waxing and waning since onset. The pain is present in the lumbar spine. The quality of the pain is described as aching and shooting. The pain radiates to the left foot. The pain is at a severity of 7/10. The pain is moderate. The pain is the same all the time. The symptoms are aggravated by twisting, standing, position and bending. Stiffness is present all day. Associated symptoms include tingling (left foot 2nd, 3rd and 4th toes) and weakness. Pertinent negatives include no chest pain or fever. Risk factors include sedentary lifestyle and obesity (hx of back surgery). She has tried NSAIDs and bed rest (norco) for the symptoms. The treatment provided mild relief.      Course During Hospital Stay:    Patient mated to the hospital for increased shortness of breath and cough.  Upon further evaluation while hospitalized she was found to have community-acquired pneumonia, sepsis and endocarditis.  Patient was treated with IV antibiotics.  While hospitalized she also had an endoscopy and colonoscopy.  She is scheduled to  follow-up outpatient in 1 month with GI to receive those results.  Patient's endocarditis continues to be managed by cardiology.  She has a follow-up appointment for an echocardiogram on 2/15/2021 and an appointment with cardiology on 2/19/2021.  She currently has home health who is providing physical and occupational therapy as well as assisting with IV antibiotic therapy.  She continues to receive 2 g of Rocephin IV daily.  Family is performing daily IV therapy and flushing of PICC line without complication.  Patient continues to have low back pain that is controlled with Norco.  Patient also reports having neuropathy type pain to her second third and fourth toes on her left foot.  She is tried many over-the-counter and home treatments for her neuropathy pain with no improvement.     The following portions of the patient's history were reviewed and updated as appropriate: allergies, current medications, past family history, past medical history, past social history, past surgical history and problem list.    Review of Systems   Constitutional: Positive for appetite change, fatigue and malaise/fatigue. Negative for chills and fever.   HENT: Positive for mouth sores (white, sore patches in mouth). Negative for postnasal drip.    Eyes: Negative.    Respiratory: Positive for cough, chest tightness, shortness of breath and wheezing.    Cardiovascular: Positive for dyspnea on exertion. Negative for chest pain, palpitations, leg swelling and PND.   Gastrointestinal: Positive for anorexia (decreased appetite).   Endocrine: Negative.  Negative for cold intolerance, heat intolerance, polydipsia, polyphagia and polyuria.   Genitourinary: Negative.    Musculoskeletal: Positive for back pain.   Skin: Negative.    Neurological: Positive for tingling (left foot 2nd, 3rd and 4th toes) and weakness.   Psychiatric/Behavioral: Negative.        Objective   Physical Exam  Constitutional:       General: She is not in acute distress.      Appearance: Normal appearance. She is well-developed. She is obese. She is not ill-appearing, toxic-appearing or diaphoretic.      Comments: Physical exam limited due to limitations of telehealth.   HENT:      Head: Normocephalic and atraumatic.      Right Ear: External ear normal.      Left Ear: External ear normal.      Nose: Nose normal.      Mouth/Throat:     Eyes:      Conjunctiva/sclera: Conjunctivae normal.      Pupils: Pupils are equal, round, and reactive to light.   Neck:      Musculoskeletal: Normal range of motion.      Thyroid: No thyromegaly.      Trachea: No tracheal deviation.   Pulmonary:      Effort: Pulmonary effort is normal. No respiratory distress.   Abdominal:      Palpations: Abdomen is soft.   Musculoskeletal: Normal range of motion.   Skin:     Coloration: Skin is not pale.   Neurological:      Mental Status: She is alert and oriented to person, place, and time.   Psychiatric:         Attention and Perception: Attention and perception normal.         Mood and Affect: Mood and affect normal.         Speech: Speech normal.         Behavior: Behavior normal. Behavior is cooperative.         Thought Content: Thought content normal. Thought content is not paranoid or delusional. Thought content does not include homicidal or suicidal ideation. Thought content does not include homicidal or suicidal plan.         Cognition and Memory: Cognition and memory normal.         Judgment: Judgment normal.         Assessment/Plan   Diagnoses and all orders for this visit:    1. Hospital discharge follow-up (Primary)    2. Streptococcal bacteremia   -No fever or chills.  Tolerating IV antibiotic therapy well.  CBC completed today shows white count returning to within normal limits.  We will continue daily IV therapy.    3. Endocarditis of aortic valve   No chest pain, shortness of breath, palpitations or altered neuro status.  Continue IV antibiotics as prescribed.  Follow-up with cardiology as scheduled.   Present to the ER for return of fever, chest pain, shortness of breath.    4. Community acquired pneumonia, unspecified laterality   Patient reports decreased shortness of breath and improving cough.  Continue antibiotics as prescribed.    5. Thrush  -     nystatin (MYCOSTATIN) 887285 UNIT/ML suspension; Swish and swallow 5 mL 4 (Four) Times a Day for 10 days.  Dispense: 200 mL; Refill: 0    6. Chronic obstructive pulmonary disease, unspecified COPD type (CMS/[facility])  -     budesonide-formoterol (Symbicort) 80-4.5 MCG/ACT inhaler; Inhale 2 puffs 2 (Two) Times a Day.  Dispense: 10.2 g; Refill: 12    7. Chronic bilateral low back pain without sciatica  -   Candido reviewed and appropriate.  HYDROcodone-acetaminophen (Norco) 7.5-325 MG per tablet; Take 1 tablet by mouth Every 6 (Six) Hours As Needed for Moderate Pain .  Dispense: 12 tablet; Refill: 0    8. Diabetes mellitus, type II, insulin dependent (CMS/[facility])  -   Fingerstick blood sugar today 117.  Continue diabetic diet medications as prescribed.  -Insulin Glargine (BASAGLAR KWIKPEN) 100 UNIT/ML injection pen; Inject 36 Units under the skin into the appropriate area as directed Every Night.  Dispense: 3 mL; Refill: 5    9. Neuropathy  -   Instructed patient to take only as needed and to take with caution.  Instructed patient of possible side effects including drowsiness, altered balance, altered cognition and interactions with other medications.  Instructed patient not to take with other sedating medications or alcohol.  Instructed patient not to take with her narcotic pain medication.  Recommended patient take first dose in the presence of someone else in case of adverse reaction.  Patient verbalized understanding of instruction.  Candido reviewed and appropriate.  -Gabapentin (NEURONTIN) 100 MG capsule; Take 1 capsule by mouth 3 (Three) Times a Day As Needed (neuropathy pain).  Dispense: 60 capsule; Refill: 0    10.  Follow-up in February as scheduled or sooner for  any acute needs.  Follow-up with cardiology as scheduled.            This document has been electronically signed by MAMTA Gifford on January 11, 2021 16:50 CST    This was an audio and video enabled telemedicine encounter. The time that was spent in reviewing the patient's chart and addressing their symptoms, diagnosis and treatment was 40 mins.   Current outpatient and discharge medications have been reconciled for the patient.  Reviewed by: MAMTA Gifford

## 2021-01-11 NOTE — PROGRESS NOTES
Subjective   Michelle Pierce is a 75 y.o. female.     Pneumonia  She complains of chest tightness, cough, difficulty breathing and wheezing. There is no shortness of breath. This is a new problem. The current episode started 1 to 4 weeks ago. The problem occurs intermittently. The problem has been gradually improving. The cough is productive of sputum and productive. Associated symptoms include appetite change, dyspnea on exertion and malaise/fatigue. Pertinent negatives include no chest pain, fever, headaches, myalgias, nasal congestion, orthopnea, sore throat, sweats, trouble swallowing or weight loss. Her symptoms are aggravated by minimal activity. Her symptoms are alleviated by rest and beta-agonist. She reports significant improvement on treatment. Risk factors for lung disease include smoking/tobacco exposure. Her past medical history is significant for COPD and pneumonia.   COPD  She complains of chest tightness, cough, difficulty breathing and wheezing. There is no shortness of breath. This is a chronic problem. The current episode started more than 1 year ago. The problem occurs intermittently. The problem has been waxing and waning. The cough is productive of sputum and productive. Associated symptoms include appetite change, dyspnea on exertion and malaise/fatigue. Pertinent negatives include no chest pain, fever, headaches, myalgias, nasal congestion, orthopnea, sore throat, sweats, trouble swallowing or weight loss. Her symptoms are aggravated by minimal activity. Her symptoms are alleviated by rest, steroid inhaler and beta-agonist. She reports moderate improvement on treatment. Risk factors for lung disease include smoking/tobacco exposure. Her past medical history is significant for COPD and pneumonia.   Heart Problem  This is a new (endocarditis) problem. The current episode started 1 to 4 weeks ago. The problem has been gradually improving. Associated symptoms include anorexia (decreased appetite),  coughing, fatigue and weakness. Pertinent negatives include no abdominal pain, arthralgias, change in bowel habit, chest pain, chills, congestion, fever, headaches, myalgias, nausea, numbness, rash, sore throat or vomiting. The symptoms are aggravated by walking. Treatments tried: IV antibiotics. tolerating well. The treatment provided mild relief.   Back Pain  This is a chronic problem. The current episode started more than 1 year ago. The problem occurs constantly. The problem is unchanged. The pain is present in the lumbar spine. The quality of the pain is described as aching. The pain is at a severity of 7/10. The pain is moderate. The pain is the same all the time. The symptoms are aggravated by twisting, standing, sitting, position, bending and coughing. Associated symptoms include tingling (left foot, 2nd, 3rd and 4th toes) and weakness. Pertinent negatives include no abdominal pain, bladder incontinence, bowel incontinence, chest pain, dysuria, fever, headaches, leg pain, numbness, paresis, paresthesias, pelvic pain, perianal numbness or weight loss. Risk factors include obesity, lack of exercise and sedentary lifestyle (hx back surgery). She has tried NSAIDs, home exercises and bed rest (norco) for the symptoms. The treatment provided mild relief.        The following portions of the patient's history were reviewed and updated as appropriate: allergies, current medications, past family history, past medical history, past social history, past surgical history and problem list.    Review of Systems   Constitutional: Positive for appetite change, fatigue and malaise/fatigue. Negative for activity change, chills, fever, unexpected weight gain and unexpected weight loss.   HENT: Negative for congestion, sore throat, trouble swallowing and voice change.    Eyes: Negative.    Respiratory: Positive for cough and wheezing. Negative for chest tightness and shortness of breath.    Cardiovascular: Positive for dyspnea  on exertion. Negative for chest pain, palpitations and leg swelling.   Gastrointestinal: Positive for anorexia (decreased appetite). Negative for abdominal pain, bowel incontinence, change in bowel habit, constipation, diarrhea, nausea and vomiting.   Endocrine: Negative.    Genitourinary: Negative for dysuria, pelvic pain and urinary incontinence.   Musculoskeletal: Positive for back pain and gait problem. Negative for arthralgias and myalgias.   Skin: Negative for rash.   Allergic/Immunologic: Negative.    Neurological: Positive for tingling (left foot, 2nd, 3rd and 4th toes) and weakness. Negative for numbness and paresthesias.   Hematological: Negative.    Psychiatric/Behavioral: Negative.        Objective   Physical Exam  Constitutional:       General: She is not in acute distress.     Appearance: Normal appearance. She is well-developed. She is obese. She is not ill-appearing, toxic-appearing or diaphoretic.      Comments: Physical exam limited due to limitations of telehealth.   HENT:      Head: Normocephalic and atraumatic.   Eyes:      Conjunctiva/sclera: Conjunctivae normal.   Neck:      Musculoskeletal: Normal range of motion.   Pulmonary:      Effort: Pulmonary effort is normal. No respiratory distress.   Musculoskeletal: Normal range of motion.   Skin:     Coloration: Skin is not pale.   Neurological:      Mental Status: She is alert and oriented to person, place, and time.   Psychiatric:         Mood and Affect: Mood normal.         Behavior: Behavior normal.         Thought Content: Thought content normal.         Judgment: Judgment normal.           Assessment/Plan   Diagnoses and all orders for this visit:    1. Hospital discharge follow-up (Primary)    2. Streptococcal bacteremia    3. Endocarditis of aortic valve    4. Community acquired pneumonia, unspecified laterality    5. Thrush  -     nystatin (MYCOSTATIN) 091190 UNIT/ML suspension; Swish and swallow 5 mL 4 (Four) Times a Day for 10 days.   Dispense: 200 mL; Refill: 0    6. Chronic obstructive pulmonary disease, unspecified COPD type (CMS/[facility])  -     budesonide-formoterol (Symbicort) 80-4.5 MCG/ACT inhaler; Inhale 2 puffs 2 (Two) Times a Day.  Dispense: 10.2 g; Refill: 12    7. Chronic bilateral low back pain without sciatica  -     HYDROcodone-acetaminophen (Norco) 7.5-325 MG per tablet; Take 1 tablet by mouth Every 6 (Six) Hours As Needed for Moderate Pain .  Dispense: 12 tablet; Refill: 0    8. Diabetes mellitus, type II, insulin dependent (CMS/[facility])  -     Insulin Glargine (BASAGLAR KWIKPEN) 100 UNIT/ML injection pen; Inject 36 Units under the skin into the appropriate area as directed Every Night.  Dispense: 3 mL; Refill: 5    9. Neuropathy  -     gabapentin (NEURONTIN) 100 MG capsule; Take 1 capsule by mouth 3 (Three) Times a Day As Needed (neuropathy pain).  Dispense: 60 capsule; Refill: 0

## 2021-01-11 NOTE — OUTREACH NOTE
Medical Week 2 Survey      Responses   [facility] patient discharged from?  [place]   Does the patient have one of the following disease processes/diagnoses(primary or secondary)?  Other   Week 2 attempt successful?  Yes   Call start time  1200   Discharge diagnosis  Endocarditis of aortic valve   Call end time  1202   Meds reviewed with patient/caregiver?  Yes   Is the patient having any side effects they believe may be caused by any medication additions or changes?  No   Does the patient have all medications ordered at discharge?  Yes   Is the patient taking all medications as directed (includes completed medication regime)?  Yes   Does the patient have a primary care provider?   Yes   Does the patient have an appointment with their PCP within 7 days of discharge?  Yes   Has the patient kept scheduled appointments due by today?  N/A   Psychosocial issues?  No   Did the patient receive a copy of their discharge instructions?  Yes   Nursing interventions  Reviewed instructions with patient, Educated on MyChart   What is the patient's perception of their health status since discharge?  Improving   Is the patient/caregiver able to teach back signs and symptoms related to disease process for when to call PCP?  Yes   Is the patient/caregiver able to teach back signs and symptoms related to disease process for when to call 911?  Yes   Is the patient/caregiver able to teach back the hierarchy of who to call/visit for symptoms/problems? PCP, Specialist, Home health nurse, Urgent Care, ED, 911  Yes   If the patient is a current smoker, are they able to teach back resources for cessation?  Smoking cessation medications   Week 2 Call Completed?  Yes   Graduated  Yes   Is the patient interested in additional calls from an ambulatory ?  NOTE:  applies to high risk patients requiring additional follow-up.  No   Did the patient feel the follow up calls were helpful during their recovery period?  Yes   Was  the number of calls appropriate?  Yes          Princess Montenegro RN

## 2021-01-21 NOTE — PROGRESS NOTES
Subjective   Michelle Pierce is a 75 y.o. female.     Ms. Pierce is a 75-year-old woman who presents today for evaluation of a mass to her left upper arm and near her left ear.  She reports mass near her left ear has been present for approximately 1 week.  She reports the mass on her left arm has been present for approximately 1 week 2.  She reports little to no pain from left arm mass and reports that it is soft when palpated.  She does report pain to the mass near her left ear.  She reports it is tender to palpation.  She also reports having a sensation of something is draining from it but sees no drainage on her skin or coming from her ear.  Patient is on long-term antibiotics for endocarditis.  She denies fever and chills.       The following portions of the patient's history were reviewed and updated as appropriate: allergies, current medications, past family history, past medical history, past social history, past surgical history and problem list.    Review of Systems   Constitutional: Negative for activity change, appetite change, fatigue, unexpected weight gain and unexpected weight loss.   HENT: Positive for ear pain (preauricular mass). Negative for congestion, sore throat, trouble swallowing and voice change.    Eyes: Negative.    Respiratory: Negative for cough, chest tightness, shortness of breath and wheezing.    Cardiovascular: Negative for chest pain, palpitations and leg swelling.   Gastrointestinal: Negative for abdominal pain, constipation, diarrhea, nausea and vomiting.   Endocrine: Negative.    Genitourinary: Negative for dysuria.   Musculoskeletal: Negative for arthralgias and myalgias.   Skin: Positive for skin lesions (left upper arm, left preauricular). Negative for rash.   Allergic/Immunologic: Negative.    Neurological: Negative.    Hematological: Negative.    Psychiatric/Behavioral: Negative.        Objective   Physical Exam  Vitals signs and nursing note reviewed.   Constitutional:        General: She is not in acute distress.     Appearance: Normal appearance. She is well-developed and normal weight. She is not ill-appearing, toxic-appearing or diaphoretic.   HENT:      Head: Normocephalic and atraumatic.        Left Ear: No decreased hearing noted. No drainage, swelling or tenderness.  No middle ear effusion. There is no impacted cerumen. No foreign body. Tympanic membrane is not injected, perforated, erythematous or bulging.   Eyes:      Conjunctiva/sclera: Conjunctivae normal.   Neck:      Musculoskeletal: Normal range of motion.   Cardiovascular:      Rate and Rhythm: Normal rate and regular rhythm.      Heart sounds: Normal heart sounds.   Pulmonary:      Effort: Pulmonary effort is normal. No respiratory distress.      Breath sounds: Normal breath sounds. No stridor. No wheezing, rhonchi or rales.   Musculoskeletal: Normal range of motion.         General: No tenderness.   Skin:     General: Skin is warm and dry.      Coloration: Skin is not pale.      Findings: No erythema or rash.          Neurological:      Mental Status: She is alert and oriented to person, place, and time.   Psychiatric:         Mood and Affect: Mood normal.         Behavior: Behavior normal.         Thought Content: Thought content normal.         Judgment: Judgment normal.           Assessment/Plan   Diagnoses and all orders for this visit:    1. Arm mass, left (Primary)  -     US Soft Tissue; Future, will call with results.    2. Preauricular mass  -     US Head Neck Soft Tissue; Future, will call with results.    3. Mediastinal lymphadenopathy  -     Ambulatory Referral to Cardiothoracic Surgery, follow-up as scheduled.    4.  Follow-up as scheduled or sooner for any acute needs.            This document has been electronically signed by MAMTA Gifford on January 21, 2021 16:53 CST

## 2021-01-25 NOTE — TELEPHONE ENCOUNTER
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 She would also like to be emailed a copy.    [email or web address]  [email or web address]

## 2021-01-25 NOTE — TELEPHONE ENCOUNTER
Patient was called to let her know a new prescription was sent to pharmacy.  Instructed to start Breo after Symbicort  completed.

## 2021-01-25 NOTE — TELEPHONE ENCOUNTER
New prescription for Breo 1 puff daily sent to pharmacy.  Discontinue Symbicort after she has completed her 1 month supply.

## 2021-02-01 NOTE — TELEPHONE ENCOUNTER
"Patient was called to let her know referral placed to Dr. Melo.        ----- Message from MAMTA Gifford sent at 2/1/2021  4:29 PM CST -----  Regarding: RE: pain  Referral placed to Dr. Melo  ----- Message -----  From: Helena Peterson MA  Sent: 2/1/2021   4:21 PM CST  To: MAMTA Gifford  Subject: pain                                             Patient states she has been having back pain and \"cannot straighten when standing.\"  patient states the pain radiates from the left side of back from the spine to top of her pubic bone and feels \"like it catches.\" she states her pain medication does not help.  Denies urinary frequency, dysuria.  Patient states she seen Dr. Mortensen years ago and if you think she needs to see someone she would prefer to see him.    Please advise,  Thanks so much.           "

## 2021-02-04 NOTE — TELEPHONE ENCOUNTER
Caller: [facility] - [place], [place] - [address] [facility] - [phone]  - [phone] FX    Relationship to patient: Pharmacy    Best call back number: [phone]    Patient is needing:     Accucheck keanu test strips were sent to pharmacy, but they are needing to be one touch. They also need a glucometer and lancets sent in as well.

## 2021-02-05 NOTE — TELEPHONE ENCOUNTER
PATIENT CALLING IN REQUESTING A CALLBACK FROM VALERIA WHEN SHE IS AVAILABLE.    PATIENT CALLBACK # [phone]

## 2021-02-05 NOTE — TELEPHONE ENCOUNTER
Patient called stating her pain in her back is radiating to left hip and left leg. She would like something to help ease the pain.  PCP notified.  Informed patient of PCP recommendations to increase Gabapentin to 300 mg three times a day.  A new prescription sent to pharmacy.  Follow up with PCP for worsening or symptoms. Patient verbalized understanding.

## 2021-02-05 NOTE — PROGRESS NOTES
Patient tolerating gabapentin well but still having back pain that is radiating down legs.  Gabapentin dosage increased to 300 mg 3 times a day.  New prescription sent to pharmacy.  Candido reviewed and appropriate.  Urine drug screen and controlled substance contract on file.

## 2021-02-08 NOTE — TELEPHONE ENCOUNTER
Home health called regarding whether PICC line could be removed. She has completed ATB therapy. Recommended to leave until after 2D TTE this week to recheck on endocarditis.                  This document has been electronically signed by MAMTA Boyce on February 8, 2021 14:58 CST

## 2021-02-09 NOTE — PROGRESS NOTES
Candido reviewed and appropriate for Norco prescription refilled on 2/9/2021.  72-hour supply prescribed.  Last 72-hour prescription lasted 1 month.  Reiterated safety considerations with narcotic pain medication.  Patient verbalized understanding of instruction.

## 2021-02-26 NOTE — PROGRESS NOTES
Subjective   Michelle Pierce is a 75 y.o. female.     CC: Follow-up-hypertension, back pain, COPD, diabetes, endocarditis    Hypertension  This is a chronic problem. The current episode started more than 1 year ago. The problem is controlled. Associated symptoms include shortness of breath. Pertinent negatives include no chest pain, headaches, malaise/fatigue, palpitations, PND or sweats. Risk factors for coronary artery disease include family history, dyslipidemia, diabetes mellitus, obesity, post-menopausal state, sedentary lifestyle and smoking/tobacco exposure. Current antihypertension treatment includes ACE inhibitors and beta blockers. The current treatment provides significant improvement. Compliance problems include exercise and diet.    Back Pain  This is a chronic problem. The current episode started more than 1 year ago. The problem occurs constantly. The problem is unchanged. The pain is present in the lumbar spine. The quality of the pain is described as aching. The pain is at a severity of 8/10. The pain is moderate. The pain is the same all the time. The symptoms are aggravated by bending, position, twisting and standing. Pertinent negatives include no abdominal pain, bladder incontinence, bowel incontinence, chest pain, dysuria, fever, headaches, leg pain, numbness, paresis, paresthesias, pelvic pain, perianal numbness, tingling, weakness or weight loss. Risk factors include obesity, menopause, lack of exercise and sedentary lifestyle. She has tried NSAIDs, home exercises, bed rest and heat for the symptoms. The treatment provided mild relief.   COPD  She complains of chest tightness, cough, shortness of breath and sputum production. There is no difficulty breathing, frequent throat clearing, hemoptysis, hoarse voice or wheezing. This is a chronic problem. The current episode started more than 1 year ago. The problem occurs daily. The problem has been gradually worsening. The cough is productive of  sputum, nocturnal and productive. Associated symptoms include dyspnea on exertion. Pertinent negatives include no appetite change, chest pain, ear congestion, ear pain, fever, headaches, heartburn, malaise/fatigue, myalgias, nasal congestion, orthopnea, PND, postnasal drip, rhinorrhea, sneezing, sore throat, sweats, trouble swallowing or weight loss. Her symptoms are aggravated by change in weather and minimal activity. Her symptoms are alleviated by rest and beta-agonist. She reports moderate improvement on treatment. Risk factors for lung disease include smoking/tobacco exposure. Her past medical history is significant for COPD.   Diabetes  She presents for her follow-up diabetic visit. She has type 2 diabetes mellitus. Her disease course has been stable. There are no hypoglycemic associated symptoms. Pertinent negatives for hypoglycemia include no headaches or sweats. There are no diabetic associated symptoms. Pertinent negatives for diabetes include no chest pain, no weakness and no weight loss. There are no hypoglycemic complications. Symptoms are stable. Risk factors for coronary artery disease include family history, dyslipidemia, diabetes mellitus, hypertension, obesity, sedentary lifestyle and tobacco exposure. Current diabetic treatment includes insulin injections and oral agent (monotherapy). She is compliant with treatment most of the time. Her weight is stable. She is following a diabetic diet. She rarely participates in exercise. Her breakfast blood glucose is taken between 6-7 am. Her breakfast blood glucose range is generally  mg/dl. An ACE inhibitor/angiotensin II receptor blocker is being taken.        The following portions of the patient's history were reviewed and updated as appropriate: allergies, current medications, past family history, past medical history, past social history, past surgical history and problem list.    Review of Systems   Constitutional: Negative for activity change,  appetite change, chills, fever, malaise/fatigue, unexpected weight gain and unexpected weight loss.   HENT: Negative for ear pain, hoarse voice, postnasal drip, rhinorrhea, sneezing, sore throat, trouble swallowing and voice change.    Eyes: Negative.    Respiratory: Positive for cough, sputum production and shortness of breath. Negative for hemoptysis, chest tightness and wheezing.    Cardiovascular: Positive for dyspnea on exertion. Negative for chest pain, palpitations, leg swelling and PND.   Gastrointestinal: Negative for abdominal pain, bowel incontinence, constipation, diarrhea, nausea and vomiting.   Endocrine: Negative.    Genitourinary: Negative for dysuria, pelvic pain and urinary incontinence.   Musculoskeletal: Positive for back pain and gait problem. Negative for arthralgias and myalgias.   Skin: Negative for rash.   Allergic/Immunologic: Negative.    Neurological: Negative for tingling, weakness, numbness and paresthesias.   Hematological: Negative.    Psychiatric/Behavioral: Negative.        Objective   Physical Exam  Vitals signs and nursing note reviewed.   Constitutional:       General: She is not in acute distress.     Appearance: Normal appearance. She is well-developed. She is obese. She is not ill-appearing, toxic-appearing or diaphoretic.   HENT:      Head: Normocephalic and atraumatic.   Eyes:      Conjunctiva/sclera: Conjunctivae normal.   Neck:      Musculoskeletal: Normal range of motion.   Cardiovascular:      Rate and Rhythm: Normal rate and regular rhythm.      Heart sounds: Normal heart sounds.   Pulmonary:      Effort: Pulmonary effort is normal. No tachypnea, bradypnea, accessory muscle usage or respiratory distress.      Breath sounds: No stridor. Examination of the right-upper field reveals wheezing. Examination of the left-upper field reveals wheezing. Examination of the right-middle field reveals wheezing. Examination of the left-middle field reveals wheezing. Examination of the  right-lower field reveals wheezing. Examination of the left-lower field reveals wheezing. Wheezing present. No rhonchi or rales.   Musculoskeletal:      Lumbar back: She exhibits decreased range of motion, tenderness and bony tenderness.   Skin:     General: Skin is warm and dry.      Coloration: Skin is not pale.      Findings: No erythema or rash.   Neurological:      Mental Status: She is alert and oriented to person, place, and time.   Psychiatric:         Behavior: Behavior normal.         Thought Content: Thought content normal.         Judgment: Judgment normal.           Assessment/Plan   Diagnoses and all orders for this visit:    1. Essential hypertension (Primary)   -Controlled.  Continue medication as prescribed.  We will continue to monitor.    2. Spondylolisthesis, acquired  -     MRI Lumbar Spine Without Contrast; Future, will call with results.  Oral narcotic medication, NSAIDs, physical therapy have not helped pain.  Patient does have a history of low back surgery.  Referral placed to neurosurgery but patient cannot be seen until MRI is completed.    3. Chronic obstructive pulmonary disease, unspecified COPD type (CMS/[facility])  -     Home Nebulizer  -     Home Nebulizer Accessories  -     albuterol (ACCUNEB) 1.25 MG/3ML nebulizer solution; Take 3 mL by nebulization Every 6 (Six) Hours As Needed for Wheezing.  Dispense: 30 each; Refill: 12   -Patient has yet started on Breo.  Instructed patient to discontinue Symbicort and start Breo.  Continue to monitor symptoms.  Patient no acute distress.    4. Diabetes mellitus, type II, insulin dependent (CMS/[facility])  -     Hemoglobin A1c; Future, will call with results.  Continue medication as prescribed.  Continue strict diabetic diet.    5. Endocarditis of aortic valve   -Patient has no chest pain, increase shortness of breath or palpitations.  Patient has completed antibiotic therapy.  Follow-up with cardiology as scheduled.    6.  Follow-up in 3 months or sooner  for any acute needs.          This document has been electronically signed by MAMTA Gifford on February 26, 2021 16:28 CST

## 2021-03-08 NOTE — PROGRESS NOTES
[facility] Cardiology  OFFICE NOTE    Cardiovascular Medicine  Nettie Rainey M.D., [facility], FSCAI, RPVI         No referring provider defined for this encounter.    Thank you for asking me to see Michelle Pierce for Infective endocartidits.    History of Present Illness  This is a 75 y.o. female with:    1.  Infect endocarditis  2.  Hypertension  3.  Hyperlipidemia  4.  Atrial flutter  5.  COPD    Michelle Pierce is a 75 y.o. female who presents for consultation today.  Patient was admitted to the hospital back in December with sepsis and positive blood cultures.  She had an echocardiogram which showed small vegetation on aortic valve.  She has completed her course of antibiotics, repeat echocardiogram in February showed resolution of the vegetation aortic valve.    Patient is here for follow-up.  No acute issues since last pressures working cutting back her smoking.  No fevers or fevers or chills.    Review of Systems - ROS  Constitution: Negative for weakness, weight gain and weight loss.   HENT: Negative for congestion.    Eyes: Negative for blurred vision.   Cardiovascular: As mentioned above  Respiratory: Negative for cough and hemoptysis.    Endocrine: Negative for polydipsia and polyuria.   Hematologic/Lymphatic: Negative for bleeding problem. Does not bruise/bleed easily.   Skin: Negative for flushing.   Musculoskeletal: Negative for neck pain and stiffness.   Gastrointestinal: Negative for abdominal pain, diarrhea, jaundice, melena, nausea and vomiting.   Genitourinary: Negative for dysuria and hematuria.   Neurological: Negative for dizziness, focal weakness and numbness.   Psychiatric/Behavioral: Negative for altered mental status and depression.          All other systems were reviewed and were negative.    family history includes Diabetes in her mother and another family member; Heart attack in her father; Heart disease in an other family member; Hypertension in her father and another  family member; Kidney failure in her father; No Known Problems in her maternal grandfather, maternal grandmother, paternal grandfather, and paternal grandmother.     reports that she has been smoking cigarettes. She has a 50.00 pack-year smoking history. She has never used smokeless tobacco. She reports that she does not drink alcohol and does not use drugs.    Allergies   Allergen Reactions   • Morphine And Related Shortness Of Breath     Dyspnea   • Morphine Sulfate Shortness Of Breath   • Morpholine Salicylate Shortness Of Breath     Morphine allergy, states couldn't breathe   • Prozac [Fluoxetine Hcl] Anxiety         Current Outpatient Medications:   •  albuterol (ACCUNEB) 1.25 MG/3ML nebulizer solution, Take 3 mL by nebulization Every 6 (Six) Hours As Needed for Wheezing., Disp: 30 each, Rfl: 12  •  albuterol sulfate HFA (Ventolin HFA) 108 (90 Base) MCG/ACT inhaler, Inhale 2 puffs Every 6 (Six) Hours As Needed for Wheezing or Shortness of Air. Indications: Chronic Obstructive Lung Disease, Disp: 18 g, Rfl: 5  •  B Complex Vitamins (VITAMIN B COMPLEX) capsule capsule, Take 1 capsule by mouth daily., Disp: , Rfl:   •  B-D UF III MINI PEN NEEDLES 31G X 5 MM misc, USE ONCE DAILY, Disp: 100 each, Rfl: 11  •  betamethasone dipropionate (DIPROLENE) 0.05 % ointment, Apply  topically 2 (Two) Times a Day. (Patient taking differently: Apply  topically to the appropriate area as directed 2 (Two) Times a Day As Needed.), Disp: 45 g, Rfl: 2  •  Blood Glucose Monitoring Suppl (ONE TOUCH ULTRA 2) w/Device kit, See Admin Instructions., Disp: , Rfl:   •  cholecalciferol (VITAMIN D3) 1000 UNITS tablet, Take 1,000 Units by mouth daily., Disp: , Rfl:   •  Eliquis 5 MG tablet tablet, TAKE 1 TABLET EVERY 12 HOURS, Disp: 60 tablet, Rfl: 4  •  FeroSul 325 (65 Fe) MG tablet, TAKE 1 TABLET TWICE DAILY, Disp: 60 tablet, Rfl: 4  •  Fluticasone Furoate-Vilanterol (BREO ELLIPTA) 100-25 MCG/INH inhaler, Inhale 1 puff Daily., Disp: 30 each,  Rfl: 5  •  gabapentin (NEURONTIN) 300 MG capsule, Take 1 capsule by mouth 3 (Three) Times a Day As Needed (neuropathy pain)., Disp: 90 capsule, Rfl: 1  •  glucose blood test strip, 1 each by Other route 3 (Three) Times a Day. Use as instructed, Disp: 100 each, Rfl: 12  •  glucose monitor monitoring kit, 1 each 3 (Three) Times a Day., Disp: 1 each, Rfl: 0  •  HYDROcodone-acetaminophen (NORCO) 7.5-325 MG per tablet, TAKE 1 TABLET BY MOUTH EVERY SIX HOURS AS NEEDED FOR MODERATE PAIN, Disp: 12 tablet, Rfl: 0  •  Insulin Glargine (BASAGLAR KWIKPEN) 100 UNIT/ML injection pen, Inject 36 Units under the skin into the appropriate area as directed Every Night., Disp: 3 mL, Rfl: 5  •  Lancets (onetouch ultrasoft) lancets, 1 each by Other route 3 (Three) Times a Day., Disp: 100 each, Rfl: 12  •  lisinopril (PRINIVIL,ZESTRIL) 5 MG tablet, TAKE ONE-HALF TABLET EVERY DAY, Disp: 90 tablet, Rfl: 2  •  lovastatin (MEVACOR) 40 MG tablet, Take 1 tablet by mouth Every Night., Disp: 90 tablet, Rfl: 2  •  metFORMIN (GLUCOPHAGE) 1000 MG tablet, TAKE 1 TABLET TWICE DAILY WITH MEALS, Disp: 180 tablet, Rfl: 1  •  metoprolol tartrate (LOPRESSOR) 25 MG tablet, Take 1 tablet by mouth 2 (Two) Times a Day., Disp: 60 tablet, Rfl: 11  •  Mirabegron ER (Myrbetriq) 25 MG tablet sustained-release 24 hour 24 hr tablet, Take 1 tablet by mouth Daily., Disp: 30 tablet, Rfl: 3  •  Omega-3 Fatty Acids (HM Fish Oil) 1000 MG capsule, TAKE 1 CAPSULE TWICE DAILY WITH MEALS, Disp: 60 capsule, Rfl: 4  •  omeprazole (priLOSEC) 40 MG capsule, TAKE 1 CAPSULE EVERY DAY, Disp: 30 capsule, Rfl: 1  •  Symbicort 80-4.5 MCG/ACT inhaler, , Disp: , Rfl:   •  traZODone (DESYREL) 50 MG tablet, TAKE 1 TABLET EVERY NIGHT, Disp: 90 tablet, Rfl: 1  •  venlafaxine XR (EFFEXOR-XR) 75 MG 24 hr capsule, TAKE 1 CAPSULE EVERY DAY, Disp: 90 capsule, Rfl: 2    Physical Exam:  Vitals:    03/08/21 1118   BP: 124/78   BP Location: Right arm   Patient Position: Sitting   Cuff Size: Adult    "  Pulse: 77   SpO2: 92%   Weight: 99.8 kg (220 lb)   Height: 162.6 cm (64.02\")   PainSc: 0-No pain     Current Pain Level: none  Pulse Ox: Normal  on room air  General: alert, appears stated age and cooperative     Body Habitus: well-nourished    HEENT: Head: Normocephalic, no lesions, without obvious abnormality. No arcus senilis, xanthelasma or xanthomas.    Neuro: alert, oriented x3  Pulses: 2+ and symmetric  JVP: Volume/Pulsation: Normal.  Normal waveforms.   Appropriate inspiratory decrease.  No Kussmaul's. No Jodi's.   Carotid Exam: no bruit normal pulsation bilaterally   Carotid Volume: normal.     Respirations: no increased work of breathing   Chest:  Normal    Pulmonary:Normal   Precordium: Normal impulses. P2 is not palpable.  RV Heave: absent  LV Heave: absent  [place]:  normal size and placement  Palpable S4: absent.  Heart rate: normal    Heart Rhythm: regular     Heart Sounds: S1: normal  S2: normal  S3: absent   S4: absent  Opening Snap: absent    Pericardial Rub:  Absent: .    Abdomen:   Appearance: normal .  Palpation: Soft, non-tender to palpation, bowel sounds positive in all four quadrants; no guarding or rebound tenderness  Extremity: no edema.   LE Skin: no rashes  LE Hair:  normal  LE Pulses: well perfused with normal pulses in the distal extremities  Pallor on elevation: Absent. Rubor on dependency: None      DATA REVIEWED:     EKG. I personally reviewed and interpreted the EKG.  Normal sinus rhythm, regular branch block.    ECG/EMG Results (all)     Procedure Component Value Units Date/Time    ECG 12 Lead [[id number]] Collected: 03/08/21 1129     Updated: 03/08/21 1128     QT Interval 444 ms      QTC Interval 502 ms     Narrative:      Test Reason : aflutt  Blood Pressure : **/** mmHG  Vent. Rate : 077 BPM     Atrial Rate : 077 BPM     P-R Int : 158 ms          QRS Dur : 132 ms      QT Int : 444 ms       P-R-T Axes : 042 268 059 degrees     QTc Int : 502 ms    Normal sinus rhythm  Right " bundle branch block  Abnormal ECG  When compared with ECG of 04-JAN-2021 06:30,  T wave inversion no longer evident in Inferior leads  T wave inversion no longer evident in Anterior leads    Referred By:             Confirmed By:         ---------------------------------------------------  TTE/NICKY:  Results for orders placed during the hospital encounter of 02/24/21    Adult Transthoracic Echo Complete w/ Color, Spectral and Contrast if Necessary Per Protocol    Interpretation Summary  · Left ventricular ejection fraction appears to be 51 - 55%. Left ventricular systolic function is normal.  · Left ventricular diastolic function was not assessed. Left ventricular diastolic function is consistent with (grade Ia w/high LAP) impaired relaxation.  · The aortic valve is abnormal in structure. The aortic valve exhibits sclerosis. The aortic valve appears trileaflet. Mild aortic valve regurgitation is present. No hemodynamically significant aortic valve stenosis is present.  · Previously seen echogenic density on aortic valve is not visualized on this study. Consider NICKY if clinical suspicion for infective endocarditis.      ----------------------------------------------------      --------------------------------------------------------------------------------------------------  LABS:     The 10-year CVD risk score (Km et al., 2008) is: 35.4%    Values used to calculate the score:      Age: 75 years      Sex: Female      Diabetic: Yes      Tobacco smoker: Yes      Systolic Blood Pressure: 124 mmHg      Is BP treated: Yes      HDL Cholesterol: 51 mg/dL      Total Cholesterol: 184 mg/dL         Lab Results   Component Value Date    GLUCOSE 219 (H) 02/01/2021    BUN 10 02/01/2021    CREATININE 0.71 02/01/2021    EGFRIFNONA 81 02/01/2021    BCR 14.1 02/01/2021    K 4.5 02/01/2021    CO2 20.0 (L) 02/01/2021    CALCIUM 9.4 02/01/2021    ALBUMIN 3.70 02/01/2021    AST 16 02/01/2021    ALT 9 02/01/2021     Lab Results      Component Value Date    WBC 8.88 02/01/2021    HGB 12.3 02/01/2021    HCT 37.2 02/01/2021    MCV 86.7 02/01/2021     02/01/2021     Lab Results   Component Value Date    CHOL 184 09/04/2020    CHLPL 168 11/05/2015    TRIG 195 (H) 09/04/2020    HDL 51 09/04/2020    LDL 94 09/04/2020     Lab Results   Component Value Date    TSH 1.980 09/04/2020     Lab Results   Component Value Date    CKTOTAL 48 12/13/2015    CKMB 1.4 12/13/2015    TROPONINI <0.012 12/13/2015    TROPONINT <0.010 12/26/2020     Lab Results   Component Value Date    HGBA1C 7.42 (H) 09/04/2020     No results found for: DDIMER  Lab Results   Component Value Date    ALT 9 02/01/2021     Lab Results   Component Value Date    HGBA1C 7.42 (H) 09/04/2020    HGBA1C 6.98 (H) 08/15/2019    HGBA1C 7.30 (H) 05/07/2019     Lab Results   Component Value Date    MICROALBUR <1.2 08/27/2020    CREATININE 0.71 02/01/2021     Lab Results   Component Value Date    IRON 62 09/04/2020    TIBC 398 09/04/2020    FERRITIN 33.70 09/04/2020     No results found for: INR, PROTIME    Assessment/Plan     1. Atrial flutter, paroxysmal (CMS/HCC)  Was noted on the monitor, she follows with Nikki and with elevated Farzad Vascor she has been started on anticoagulation with Eliquis.  Currently normal sinus rhythm.    2.  Infective endocarditis:  She was noted to have vegetation on aortic valve which has resolved after completion of antibiotics course.  Her cultures were positive for Streptococcus infantarius, so she underwent colonoscopy and EGD to rule out GI malignancy.  She did have diverticuli which could have been the source of her bacteremia.  She did have lymphadenopathy and is following with CT surgery.        Prevention:  Patient's Body mass index is 37.74 kg/m². BMI is above normal parameters. Recommendations include: exercise counseling and nutrition counseling.      Michelle Pierce  reports that she has been smoking cigarettes. She has a 50.00 pack-year smoking  history. She has never used smokeless tobacco.. I have educated her on the risk of diseases from using tobacco products such as cancer, COPD and heart disease.     I advised her to quit and she is not willing to quit.    I spent 3  minutes counseling the patient.      Patient established Nikki Oliveira and can follow-up with her.             This document has been electronically signed by Nettie Rainey MD on March 8, 2021 11:40 CST

## 2021-03-25 PROBLEM — R91.8 LUNG MASS: Status: ACTIVE | Noted: 2021-01-01

## 2021-03-25 PROBLEM — E78.2 MIXED HYPERLIPIDEMIA: Status: ACTIVE | Noted: 2019-05-14

## 2021-03-25 PROBLEM — R09.02 HYPOXIA: Status: ACTIVE | Noted: 2021-01-01

## 2021-03-25 PROBLEM — R65.10 SIRS (SYSTEMIC INFLAMMATORY RESPONSE SYNDROME) (HCC): Status: ACTIVE | Noted: 2021-01-01

## 2021-03-25 PROBLEM — J96.01 ACUTE HYPOXEMIC RESPIRATORY FAILURE (HCC): Status: ACTIVE | Noted: 2021-01-01

## 2021-03-25 PROBLEM — J44.1 COPD WITH EXACERBATION (HCC): Status: ACTIVE | Noted: 2020-01-01

## 2021-03-25 PROBLEM — D49.9 NEOPLASTIC DISEASE: Status: ACTIVE | Noted: 2021-01-01

## 2021-03-25 PROBLEM — R59.0 LYMPHADENOPATHY, MEDIASTINAL: Status: ACTIVE | Noted: 2021-01-01

## 2021-03-25 NOTE — PROGRESS NOTES
Atrial Flutter (chief complaint)      Atrial Flutter  Associated symptoms include coughing. Pertinent negatives include no chest pain.        1. Atrial flutter, paroxysmal (CMS/HCC)    2. Endocarditis of aortic valve    3. Essential hypertension    4. Mixed hyperlipidemia    5. Tobacco dependence    6. BMI 40.0-44.9, adult (CMS/HCC)    7. 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 Chronic obstructive pulmonary disease, unspecified COPD type (CMS/HCC)    9. Obstructive sleep apnea syndrome    10. Bilateral carotid artery stenosis    11. Hypoxia       Mrs. Michelle Pierce is a 74-year-old  female with past medical history of COPD, HTN, Anx/DPN, Type II DM, HLD, JENA with CPAP compliance, current smoker 1ppd/50 yrs and questionable recent TIA/syncopal episode (no neurologic deficits). She presents today as a follow up for above complaints.    Holter monitor was completed showing: intermittent atrial flutter 2:1 with rapid ventricular rate, patient chads Vasc score= 6.  Patient was started on beta-blocker (metoprolol) to help with rate control.  As well given patient's increased risk factors, including recent TIA patient is a candidate for anticoagulation.  Discussed this in detail with patient and she is agreeable to start Eliquis 5 mg twice daily.  Echocardiogram was completed showing normal biventricular function with LVEF at 51 to 55%.  Mild LVH.  Grade 1 diastolic dysfunction.  Trace to mild MR.  Patient does have COPD and JEAN and RVSP is mildly elevated at 35 to 45 mmHg.     Since last visit, she was admitted to the hospital back in December with sepsis and positive blood cultures.  She had an echocardiogram which showed small vegetation on aortic valve.  She has completed her course of antibiotics, repeat echocardiogram in February showed resolution of the vegetation aortic valve.  Today upon entering exam patient appeared to be in respiratory distress, MA  checked oxygen level and was in the 70s.  O2 supplementation was placed on patient at 4 L.  She was given albuterol inhaler x3 with improvement in 02 to 93%.  She continues to use accessory muscles, have pursed lip breathing, adventitious lung sounds noted.    The 10-year ASCVD risk score (Miguel MITCHELL Jr., et al., 2013) is: 42.7%    Values used to calculate the score:      Age: 75 years      Sex: Female      Is Non- : No      Diabetic: Yes      Tobacco smoker: Yes      Systolic Blood Pressure: 128 mmHg      Is BP treated: Yes      HDL Cholesterol: 51 mg/dL      Total Cholesterol: 184 mg/dL      Cardiac Risk Factors:  diabetes mellitus, hypercholesterolemia, hypertension, smoking, Sedentary life style and Obesity  Comments Denies prior cardiac hx    Past Medical History:   Diagnosis Date   • Arthritis of spine    • Chronic obstructive lung disease (CMS/HCC)    • Cirrhosis (CMS/HCC)    • Depression    • Diabetes mellitus (CMS/HCC)    • Hyperlipidemia    • Hypertension    • Neurologic disorder    • Obesity    • Sleep apnea    • Tobacco dependence      Past Surgical History:   Procedure Laterality Date   • BACK SURGERY  2013    May 1 or 2, 2013   • COLONOSCOPY  01/21/2005   • COLONOSCOPY N/A 6/1/2017    Procedure: COLONOSCOPY;  Surgeon: Mat Jennings MD;  Location: [facility] ENDOSCOPY;  Service:    • COLONOSCOPY N/A 12/31/2020    Procedure: COLONOSCOPY WITH BIOPSY;  Surgeon: Brendon Gramajo DO;  Location: [facility] ENDOSCOPY;  Service: Gastroenterology;  Laterality: N/A;   • ENDOSCOPY N/A 12/31/2020    Procedure: ESOPHAGOGASTRODUODENOSCOPY WITH CONTROL OF BLEED;  Surgeon: Brendon Gramajo DO;  Location: [facility] ENDOSCOPY;  Service: Gastroenterology;  Laterality: N/A;   • ESOPHAGOSCOPY / EGD  02/25/1998    Mild esophagitis, mild gastritis, the most likely diagnosis for the esophageal inflammation is reflux esophagitis.   • EYE SURGERY      cataract, right eye   • FINGER SURGERY  04/07/1994     Partial amputation, right ring finger   • HYSTERECTOMY     • TUBAL ABDOMINAL LIGATION       Social History     Socioeconomic History   • Marital status:      Spouse name: Not on file   • Number of children: Not on file   • Years of education: Not on file   • Highest education level: Not on file   Tobacco Use   • Smoking status: Current Every Day Smoker     Packs/day: 1.00     Years: 50.00     Pack years: 50.00     Types: Cigarettes   • Smokeless tobacco: Never Used   Vaping Use   • Vaping Use: Never used   Substance and Sexual Activity   • Alcohol use: No   • Drug use: No   • Sexual activity: Defer     Family History   Problem Relation Age of Onset   • Diabetes Other    • Heart disease Other    • Hypertension Other    • Diabetes Mother    • Hypertension Father    • Heart attack Father    • Kidney failure Father    • No Known Problems Maternal Grandmother    • No Known Problems Maternal Grandfather    • No Known Problems Paternal Grandmother    • No Known Problems Paternal Grandfather        ALLERGIES:  Allergies   Allergen Reactions   • Morphine And Related Shortness Of Breath     Dyspnea   • Morphine Sulfate Shortness Of Breath   • Morpholine Salicylate Shortness Of Breath     Morphine allergy, states couldn't breathe   • Prozac [Fluoxetine Hcl] Anxiety         Review of Systems   Cardiovascular: Positive for dyspnea on exertion. Negative for chest pain, leg swelling and palpitations.   Respiratory: Positive for cough, shortness of breath and wheezing.        Current Outpatient Medications   Medication Sig Dispense Refill   • albuterol (ACCUNEB) 1.25 MG/3ML nebulizer solution Take 3 mL by nebulization Every 6 (Six) Hours As Needed for Wheezing. 30 each 12   • albuterol sulfate HFA (Ventolin HFA) 108 (90 Base) MCG/ACT inhaler Inhale 2 puffs Every 6 (Six) Hours As Needed for Wheezing or Shortness of Air. Indications: Chronic Obstructive Lung Disease 18 g 5   • B Complex Vitamins (VITAMIN B COMPLEX) capsule  capsule Take 1 capsule by mouth daily.     • B-D UF III MINI PEN NEEDLES 31G X 5 MM misc USE ONCE DAILY 100 each 11   • betamethasone dipropionate (DIPROLENE) 0.05 % ointment Apply  topically 2 (Two) Times a Day. (Patient taking differently: Apply  topically to the appropriate area as directed 2 (Two) Times a Day As Needed.) 45 g 2   • Blood Glucose Monitoring Suppl (ONE TOUCH ULTRA 2) w/Device kit See Admin Instructions.     • cholecalciferol (VITAMIN D3) 1000 UNITS tablet Take 1,000 Units by mouth daily.     • Eliquis 5 MG tablet tablet TAKE 1 TABLET EVERY 12 HOURS 60 tablet 4   • FeroSul 325 (65 Fe) MG tablet TAKE 1 TABLET TWICE DAILY 60 tablet 4   • Fluticasone Furoate-Vilanterol (BREO ELLIPTA) 100-25 MCG/INH inhaler Inhale 1 puff Daily. 30 each 5   • gabapentin (NEURONTIN) 300 MG capsule Take 1 capsule by mouth 3 (Three) Times a Day As Needed (neuropathy pain). 90 capsule 1   • glucose blood test strip 1 each by Other route 3 (Three) Times a Day. Use as instructed 100 each 12   • glucose monitor monitoring kit 1 each 3 (Three) Times a Day. 1 each 0   • HYDROcodone-acetaminophen (NORCO) 7.5-325 MG per tablet TAKE 1 TABLET BY MOUTH EVERY SIX HOURS AS NEEDED FOR MODERATE PAIN 12 tablet 0   • Insulin Glargine (BASAGLAR KWIKPEN) 100 UNIT/ML injection pen Inject 36 Units under the skin into the appropriate area as directed Every Night. 3 mL 5   • Lancets (onetouch ultrasoft) lancets 1 each by Other route 3 (Three) Times a Day. 100 each 12   • lisinopril (PRINIVIL,ZESTRIL) 5 MG tablet TAKE ONE-HALF TABLET EVERY DAY 90 tablet 2   • lovastatin (MEVACOR) 40 MG tablet Take 1 tablet by mouth Every Night. 90 tablet 2   • metFORMIN (GLUCOPHAGE) 1000 MG tablet TAKE 1 TABLET TWICE DAILY WITH MEALS 180 tablet 1   • metoprolol tartrate (LOPRESSOR) 25 MG tablet Take 1 tablet by mouth 2 (Two) Times a Day. 60 tablet 11   • Mirabegron ER (Myrbetriq) 25 MG tablet sustained-release 24 hour 24 hr tablet Take 1 tablet by mouth Daily. 30  tablet 3   • Omega-3 Fatty Acids (HM Fish Oil) 1000 MG capsule TAKE 1 CAPSULE TWICE DAILY WITH MEALS 60 capsule 4   • omeprazole (priLOSEC) 40 MG capsule TAKE 1 CAPSULE EVERY DAY 30 capsule 1   • Symbicort 80-4.5 MCG/ACT inhaler      • traZODone (DESYREL) 50 MG tablet TAKE 1 TABLET EVERY NIGHT 90 tablet 1   • venlafaxine XR (EFFEXOR-XR) 75 MG 24 hr capsule TAKE 1 CAPSULE EVERY DAY 90 capsule 2     No current facility-administered medications for this visit.       OBJECTIVE:    Physical Exam:   Physical Exam  Vitals reviewed.   Constitutional:       General: She is not in acute distress.     Appearance: Normal appearance. She is well-developed. She is not toxic-appearing or diaphoretic.   HENT:      Head: Normocephalic and atraumatic.      Right Ear: External ear normal.      Left Ear: External ear normal.   Eyes:      General: Lids are normal.      Conjunctiva/sclera: Conjunctivae normal.   Neck:      Vascular: No JVD.   Cardiovascular:      Rate and Rhythm: Normal rate and regular rhythm.      Chest Wall: PMI is not displaced.      Pulses: Normal pulses and intact distal pulses.      Heart sounds: Normal heart sounds, S1 normal and S2 normal. No murmur heard.   No friction rub. No gallop. No S3 or S4 sounds.    Pulmonary:      Effort: Accessory muscle usage present.      Breath sounds: Decreased breath sounds, wheezing and rales present.   Chest:      Chest wall: No tenderness.   Abdominal:      General: Bowel sounds are normal. There is no distension.      Palpations: Abdomen is soft.      Tenderness: There is no abdominal tenderness.   Musculoskeletal:      Cervical back: Normal range of motion and neck supple.   Skin:     General: Skin is warm and dry.      Coloration: Skin is not pale.      Findings: No erythema or rash.   Neurological:      Mental Status: She is alert and oriented to person, place, and time.      Gait: Gait normal.   Psychiatric:         Behavior: Behavior normal.         Thought Content:  "Thought content normal.         Judgment: Judgment normal.       Vitals:    03/25/21 1255   BP: 122/70   Pulse: 93   SpO2: 92%   Weight: 99.8 kg (220 lb)   Height: 162.6 cm (64.02\")       DATA REVIEWED:   Results for orders placed during the hospital encounter of 02/24/21    Adult Transthoracic Echo Complete w/ Color, Spectral and Contrast if Necessary Per Protocol    Interpretation Summary  · Left ventricular ejection fraction appears to be 51 - 55%. Left ventricular systolic function is normal.  · Left ventricular diastolic function was not assessed. Left ventricular diastolic function is consistent with (grade Ia w/high LAP) impaired relaxation.  · The aortic valve is abnormal in structure. The aortic valve exhibits sclerosis. The aortic valve appears trileaflet. Mild aortic valve regurgitation is present. No hemodynamically significant aortic valve stenosis is present.  · Previously seen echogenic density on aortic valve is not visualized on this study. Consider NICKY if clinical suspicion for infective endocarditis.      No radiology results for the last 30 days.  CXR:     CT:     Labs: BMP, CBC, LIPID, TSH  Lab Results   Component Value Date    GLUCOSE 219 (H) 02/01/2021    CALCIUM 9.4 02/01/2021     (L) 02/01/2021    K 4.5 02/01/2021    CO2 20.0 (L) 02/01/2021    CL 98 02/01/2021    BUN 10 02/01/2021    CREATININE 0.71 02/01/2021    EGFRIFNONA 81 02/01/2021    BCR 14.1 02/01/2021    ANIONGAP 15.0 02/01/2021     Lab Results   Component Value Date    WBC 8.88 02/01/2021    HGB 12.3 02/01/2021    HCT 37.2 02/01/2021    MCV 86.7 02/01/2021     02/01/2021     Lab Results   Component Value Date    CHOL 184 09/04/2020    CHOL 168 05/07/2019    CHOL 170 04/02/2018     Lab Results   Component Value Date    TRIG 195 (H) 09/04/2020    TRIG 117 05/07/2019    TRIG 119 04/02/2018     Lab Results   Component Value Date    HDL 51 09/04/2020    HDL 48 05/07/2019    HDL 48 (L) 04/02/2018     No components found for: " LDLCALC  Lab Results   Component Value Date    LDL 94 09/04/2020    LDL 97 05/07/2019    LDL 83 04/02/2018     No results found for: HDLLDLRATIO  No components found for: CHOLHDL  Lab Results   Component Value Date    TSH 1.980 09/04/2020     Lab Results   Component Value Date    PROBNP 299.9 12/26/2020     EKG:             TTE:     Left Ventricle Estimated EF = 53% Left ventricular ejection fraction appears to be 51 - 55%. Left ventricular systolic function is normal. Normal left ventricular cavity size noted. Left ventricular wall thickness is consistent with borderline concentric hypertrophy. All left ventricular wall segments contract normally. Left ventricular diastolic function is consistent with (grade I) impaired relaxation.   Right Ventricle Normal right ventricular cavity size, wall thickness, systolic function and septal motion noted.   Left Atrium Normal left atrial size and volume noted.   Right Atrium Normal right atrial size noted.   Aortic Valve The aortic valve is abnormal in structure. The aortic valve exhibits sclerosis. The aortic valve appears trileaflet. Trace aortic valve regurgitation is present. No hemodynamically significant aortic valve stenosis is present.   Mitral Valve The mitral valve is structurally normal with no significant stenosis present. Trace to mild mitral valve regurgitation is present.   Tricuspid Valve The tricuspid valve is structurally normal with no significant regurgitation or significant stenosis present. Estimated right ventricular systolic pressure from tricuspid regurgitation is mildly elevated (35-45 mmHg).   Pulmonic Valve The pulmonic valve is structurally normal with no regurgitation or significant stenosis present.   Greater Vessels No dilation of the aortic root is present.   Pericardium The pericardium is normal. There is no evidence of pericardial effusion.     Holter:     Sinus rhythm with intermittent atrial tachycardia/atrial flutter with average heart  rate 95 minimum 65 to maximum 130 bpm.  Rare isolated premature supraventricular tachycardia be representing less than 1% no PVCs noted.  No significant bradyarrhythmias or pauses noted.     Impression     Sinus rhythm with intermittent tachycardia with good average heart rate without significant bradyarrhythmias or pauses noted     Atrial tachycardia at a rate of 130 bpm however the possibility of atrial flutter cannot be excluded     #3 rare isolated premature supraventricular ectopic beat no PVCs noted.      Carotid duplex:     Mild to moderate atherosclerotic disease in the region of the  bilateral bulb/internal carotid arteries.   No significant flow-limiting stenosis by velocity measurements.     Electronically signed by:  Ramy Allen MD  9/4/2020 4:14 PM CDT  Workstation: [id number]    Stress tests:     [facility]:      The following portions of the patient's history were reviewed and updated as appropriate: allergies, current medications, past family history, past medical history, past social history, past surgical history and problem list.  Old records reviewed and pertinent information is included in the above objective data.     ASSESSMENT/PLAN:       Diagnosis Plan   1.   Endocarditis of aortic valve  Resolved.patient was admitted to the hospital in December with sepsis and positive blood cultures.  She had echocardiogram which showed small vegetation of the aortic valve.  She has completed her course of antibiotics.  Repeat echo showed resolution of vegetation on aortic valve.   2.  PAF Atrial flutter paroxysmal  CHADSVASC: HTN, Age >65, DM, TIA and Female  EF is 51-55%. mild evidence for LVH. LA size is normal.    Rate is controlled at this time    Anticoagulation:ordered  - Eliquis 5mg BID    Rate control agents:ordered  - Metoprolol tartrate 25mg BID    Rhythm control agents:not indicated       3. Essential hypertension  Chronic, controlled.   BP noted to be normal today and improved  DASH; medication  compliance  Continue current management with Lisinopril 5mg   Continue metoprolol tartrate 25mg BID       5. Mixed hyperlipidemia  Chronic. stable.  Dyslipidemia under excellent control.  Statin:  Yes.  Lovastatin 40mg   Smoking cessation     6. Tobacco dependence Michelle Pierce  reports that she has been smoking cigarettes. She has a 50.00 pack-year smoking history. She has never used smokeless tobacco.. I have educated her on the risk of diseases from using tobacco products such as cancer, COPD and heart diease.     I advised her to quit and she is not willing to quit.    I spent 3  minutes counseling the patient.       7. Obesity Patient's Body mass index is 37.74 kg/m². BMI is above normal parameters. Recommendations include: educational material, exercise counseling and nutrition counseling.     8. Type II DM Lab Results   Component Value Date    HGBA1C 7.42 (H) 09/04/2020     Chronic, stable. Management per PCP.     9. COPD Albuterol inhaler started. Referral placed to pulmonology for PFT and further eval/tx     10. JENA with CPAP  Chronic, stable.  -Avoid ETOH, weight reduction  -Treatment of HTN  -Patient is CPAP compliant and follows routinely with sleep medicine.     11.  Bilateral carotid artery stenosis  Mild to moderate atherosclerotic disease bilaterally.  No significant flow-limiting stenosis.  Antegrade flow present.  -Recommend repeat carotid ultrasound in 1 year.   -Continue statin.  -ASA 81mg daily.      12. Hypoxia Today upon entering exam patient appeared to be in respiratory distress, MA checked oxygen level and was in the 70s.  O2 supplementation was placed on patient at 4 L.  She was given albuterol inhaler x3 with improvement in 02 to 93%.  She continues to use accessory muscles, have pursed lip breathing, adventitious lung sounds noted.  -Patient taken to ER for further evaluation and treatment. Report called to ER.        Follow up: 3 months.                This document has been  electronically signed by MAMTA Boyce on March 25, 2021 13:39 CDT

## 2021-03-27 PROBLEM — R65.10 SIRS (SYSTEMIC INFLAMMATORY RESPONSE SYNDROME) (HCC): Status: RESOLVED | Noted: 2021-01-01 | Resolved: 2021-01-01

## 2021-03-30 PROBLEM — C34.91 MALIGNANT NEOPLASM OF RIGHT LUNG STAGE 4 (HCC): Status: ACTIVE | Noted: 2021-01-01

## 2021-03-30 PROBLEM — R50.9 FEVER: Status: RESOLVED | Noted: 2020-01-01 | Resolved: 2021-01-01

## 2024-06-14 NOTE — TELEPHONE ENCOUNTER
Pt needs a refill on AccuChek Anabel plus test strips and lantus solostar 42 units at night sent to [facility]  
[facility]

## (undated) DEVICE — CANN SMPL SOFTECH BIFLO ETCO2 A/M 7FT

## (undated) DEVICE — TRAP SXN POLYP QUICKCATCH LF

## (undated) DEVICE — PAD GRND REM POLYHESIVE A/ DISP

## (undated) DEVICE — Device: Brand: DISPOSABLE ELECTROSURGICAL SNARE

## (undated) DEVICE — SINGLE-USE BIOPSY FORCEPS: Brand: RADIAL JAW 4

## (undated) DEVICE — INST FRCP BIOPSY RADIALJAW4 W NDL 2.8MM 240CM JUMBO BX40 ORN

## (undated) DEVICE — BITEBLOCK ENDO W/STRAP 60F A/ LF DISP